# Patient Record
Sex: FEMALE | Race: BLACK OR AFRICAN AMERICAN | Employment: OTHER | URBAN - METROPOLITAN AREA
[De-identification: names, ages, dates, MRNs, and addresses within clinical notes are randomized per-mention and may not be internally consistent; named-entity substitution may affect disease eponyms.]

---

## 2017-03-06 ENCOUNTER — HOSPITAL ENCOUNTER (OUTPATIENT)
Dept: LAB | Age: 67
Discharge: HOME | End: 2017-03-06

## 2017-03-06 ENCOUNTER — OFFICE VISIT (OUTPATIENT)
Dept: INTERNAL MEDICINE CLINIC | Age: 67
End: 2017-03-06

## 2017-03-06 VITALS
WEIGHT: 233.6 LBS | DIASTOLIC BLOOD PRESSURE: 75 MMHG | SYSTOLIC BLOOD PRESSURE: 174 MMHG | HEIGHT: 62 IN | RESPIRATION RATE: 20 BRPM | HEART RATE: 100 BPM | BODY MASS INDEX: 42.99 KG/M2 | TEMPERATURE: 97.7 F | OXYGEN SATURATION: 100 %

## 2017-03-06 DIAGNOSIS — E78.00 HYPERCHOLESTEROLEMIA: Primary | Chronic | ICD-10-CM

## 2017-03-06 DIAGNOSIS — N18.30 CRI (CHRONIC RENAL INSUFFICIENCY), STAGE 3 (MODERATE) (HCC): Chronic | ICD-10-CM

## 2017-03-06 DIAGNOSIS — I10 ESSENTIAL HYPERTENSION: Chronic | ICD-10-CM

## 2017-03-06 DIAGNOSIS — Z13.5 SCREENING FOR GLAUCOMA: ICD-10-CM

## 2017-03-06 PROCEDURE — 99001 SPECIMEN HANDLING PT-LAB: CPT | Performed by: INTERNAL MEDICINE

## 2017-03-06 RX ORDER — AMLODIPINE BESYLATE 10 MG/1
10 TABLET ORAL DAILY
Qty: 30 TAB | Refills: 5 | Status: SHIPPED | OUTPATIENT
Start: 2017-03-06 | End: 2017-08-28 | Stop reason: SDUPTHER

## 2017-03-06 NOTE — PROGRESS NOTES
Chief Complaint   Patient presents with    Cholesterol Problem    Hypertension       Pt preferred language for health care discussion is english. Is someone accompanying this pt? no    Is the patient using any DME equipment during [address]? [place]    Depression Screening completed. Yes    Learning Assessment completed. Yes    Abuse Screening completed. Yes    Health Maintenance reviewed and discussed per provider. Yes    Pt is due for Eye exam.  Please order/place referral if appropriate. Advance Directive:  1. Do you have an advance directive in place? Patient Reply:no    2. If not, would you like material regarding how to put one in place? Patient Reply: No    Coordination of Care:  1. Have you been to the ER, urgent care clinic since your last visit? Hospitalized since your last visit? no    2. Have you seen or consulted any other health care providers outside of the [address] [place] [address] since your last visit? Include any pap smears or colon screening.  no

## 2017-03-06 NOTE — PROGRESS NOTES
HISTORY OF PRESENT ILLNESS  Cyn Perry is a 77 y.o. female. Visit Vitals    /78    Pulse 100    Temp 97.7 °F (36.5 °C) (Oral)    Resp 20    Ht 5' 2\" (1.575 m)    Wt 233 lb 9.6 oz (106 kg)    SpO2 100%    BMI 42.73 kg/m2       HPI Comments: Pt never got to the kidney specialist--see notes. She either cancelled on no showed for 3 appts so they will not reschedule her. Will need to find another doctor that takes [facility]    Cholesterol Problem   The history is provided by the patient. This is a chronic problem. The current episode started more than 1 week ago. The problem occurs daily. Pertinent negatives include no chest pain and no headaches. Hypertension    Pertinent negatives include no chest pain, no palpitations, no headaches and no dizziness. Review of Systems   Constitutional: Negative. Cardiovascular: Negative for chest pain and palpitations. Neurological: Negative for dizziness and headaches. Physical Exam   Constitutional: She is oriented to person, place, and time. She appears well-developed and well-nourished. No distress. Cardiovascular: Normal rate and regular rhythm. Pulmonary/Chest: Effort normal and breath sounds normal.   Musculoskeletal: She exhibits edema. Neurological: She is alert and oriented to person, place, and time. Skin: Skin is warm and dry. She is not diaphoretic. Psychiatric: She has a normal mood and affect. Nursing note and vitals reviewed. ASSESSMENT and PLAN    ICD-10-CM ICD-9-CM    1. Hypercholesterolemia E78.00 272.0 LIPID PANEL   2. Essential hypertension O26 688.1 METABOLIC PANEL, COMPREHENSIVE      URINALYSIS W/ RFLX MICROSCOPIC   3. CRI (chronic renal insufficiency), stage 3 (moderate) N18.3 585.3 REFERRAL TO NEPHROLOGY      METABOLIC PANEL, COMPREHENSIVE      MICROALBUMIN, UR, RAND W/ MICROALBUMIN/CREA RATIO      URINALYSIS W/ RFLX MICROSCOPIC   4.  Screening for glaucoma Z13.5 V80.1 REFERRAL TO OPTOMETRY       BP up. Will inc norvasc to 10 mg. She may not have been taking it as her refills ran out months ago.   Consider adding hydralazine given her renal disease    Will try for another nephrology office    Needs referral to eye doctor for glaucoma screening    Update lab    F/u one month for BP check

## 2017-03-06 NOTE — MR AVS SNAPSHOT
Visit Information Date & Time Provider Department Dept. Phone Encounter #  
 3/6/2017 10:45 AM Ajith Phipps, [address] [id number] Follow-up Instructions Return in about 1 month (around 4/6/2017) for Medicare Wellness Visit, htn. Upcoming Health Maintenance Date Due  
 GLAUCOMA SCREENING Q2Y 5/4/2015 Pneumococcal 65+ Low/Medium Risk (2 of 2 - PPSV23) 4/12/2017 MEDICARE YEARLY EXAM 4/13/2017 BREAST CANCER SCRN MAMMOGRAM 11/22/2018 COLONOSCOPY 2/1/2020 DTaP/Tdap/Td series (2 - Td) 9/28/2021 Allergies as of 3/6/2017  Review Complete On: 3/6/2017 By: Shana Kraft LPN No Known Allergies Current Immunizations  Reviewed on 10/8/2015 Name Date Influenza High Dose Vaccine PF 11/4/2016 12:35 PM, 10/8/2015  2:30 PM  
 Pneumococcal Conjugate (PCV-13) 4/12/2016 11:48 AM  
 Tdap 9/28/2011 Not reviewed this visit You Were Diagnosed With   
  
 Codes Comments Hypercholesterolemia    -  Primary ICD-10-CM: E78.00 ICD-9-CM: 272.0 Essential hypertension     ICD-10-CM: I10 
ICD-9-CM: 401.9 CRI (chronic renal insufficiency), stage 3 (moderate)     ICD-10-CM: N18.3 ICD-9-CM: 491. 3 Screening for glaucoma     ICD-10-CM: Z13.5 ICD-9-CM: V80.1 Vitals BP Pulse Temp Resp Height(growth percentile) Weight(growth percentile) 174/75 100 97.7 °F (36.5 °C) (Oral) 20 5' 2\" (1.575 m) 233 lb 9.6 oz (106 kg) SpO2 BMI OB Status Smoking Status 100% 42.73 kg/m2 Postmenopausal Former Smoker Vitals History BMI and BSA Data Body Mass Index Body Surface Area 42.73 kg/m 2 2.15 m 2 Preferred Pharmacy Pharmacy Name Phone [facility] [address] [place] [address]  [phone] Your Updated Medication List  
  
   
This list is accurate as of: 3/6/17 11:21 AM.  Always use your most recent med list. amLODIPine 10 mg tablet Commonly known as:  Zonia Bakes Take 1 Tab by mouth daily. Indications: hypertension  
  
 betamethasone valerate 0.1 % topical cream  
Commonly known as:  [place] Apply  to affected area two (2) times a day. raNITIdine 150 mg tablet Commonly known as:  ZANTAC Take 1 Tab by mouth daily. Indications: GASTROESOPHAGEAL REFLUX  
  
 simvastatin 20 mg tablet Commonly known as:  ZOCOR Take 1 Tab by mouth nightly. Indications: hyperlipidemia  
  
 valsartan-hydroCHLOROthiazide 160-12.5 mg per tablet Commonly known as:  DIOVAN-HCT Take 1 Tab by mouth daily. varicella-zoster vacine live 19,400 unit/0.65 mL Susr injection Generic drug:  varicella zoster vacine live Prescriptions Sent to Pharmacy Refills  
 amLODIPine (NORVASC) 10 mg tablet 5 Sig: Take 1 Tab by mouth daily. Indications: hypertension Class: Normal  
 Pharmacy: [address] [place] [address] #: [phone] Route: Oral  
  
We Performed the Following REFERRAL TO NEPHROLOGY [AXI34 Custom] Comments:  
 Please evaluate patient for worsening renal insufficiency. REFERRAL TO OPTOMETRY [id number] Custom] Follow-up Instructions Return in about 1 month (around 4/6/2017) for Medicare Wellness Visit, htn. To-Do List   
 03/06/2017 Lab:  LIPID PANEL   
  
 03/06/2017 Lab:  METABOLIC PANEL, COMPREHENSIVE   
  
 03/06/2017 Lab:  MICROALBUMIN, UR, RAND W/ MICROALBUMIN/CREA RATIO   
  
 03/06/2017 Lab:  URINALYSIS W/ RFLX MICROSCOPIC Referral Information Referral ID Referred By Referred To  
  
 [id number] [facility] Not Available Visits Status Start Date End Date 1 New Request 3/6/17 3/6/18 If your referral has a status of pending review or denied, additional information will be sent to support the outcome of this decision. Referral ID Referred By Referred To  
 [id number] [facility] Not Available Visits Status Start Date End Date 1 New Request 3/6/17 3/6/18 If your referral has a status of pending review or denied, additional information will be sent to support the outcome of this decision. Introducing [facility]! Kings Almanza introduces [employer] patient portal. Now you can access parts of your medical record, email your doctor's office, and request medication refills online. 1. In your internet browser, go to [email or web address] 2. Click on the First Time User? Click Here link in the Sign In box. You will see the New Member Sign Up page. 3. Enter your [employer] Access Code exactly as it appears below. You will not need to use this code after youve completed the sign-up process. If you do not sign up before the expiration date, you must request a new code. · [employer] Access Code: [email or web address] Expires: 6/4/2017 11:21 AM 
 
4. Enter the last four digits of your Social Security Number (xxxx) and Date of Birth (mm/dd/yyyy) as indicated and click Submit. You will be taken to the next sign-up page. 5. Create a TheS[employer] ID. This will be your [employer] login ID and cannot be changed, so think of one that is secure and easy to remember. 6. Create a [employer] password. You can change your password at any time. 7. Enter your Password Reset Question and Answer. This can be used at a later time if you forget your password. 8. Enter your e-mail address. You will receive e-mail notification when new information is available in [address] 9. Click Sign Up. You can now view and download portions of your medical record. 10. Click the Download Summary menu link to download a portable copy of your medical information. If you have questions, please visit the Frequently Asked Questions section of the [employer] website. Remember, [employer] is NOT to be used for urgent needs. For medical emergencies, dial 911. Now available from your iPhone and Android! Please provide this summary of care documentation to your next provider. Your primary care clinician is listed as [facility]. If you have any questions after today's visit, please call [phone].

## 2017-03-07 DIAGNOSIS — Z76.0 MEDICATION REFILL: ICD-10-CM

## 2017-03-07 LAB
ALBUMIN SERPL-MCNC: 3.9 G/DL (ref 3.6–4.8)
ALBUMIN/CREAT UR: 4.9 MG/G CREAT (ref 0–30)
ALBUMIN/GLOB SERPL: 1.3 {RATIO} (ref 1.1–2.5)
ALP SERPL-CCNC: 83 IU/L (ref 39–117)
ALT SERPL-CCNC: 8 IU/L (ref 0–32)
APPEARANCE UR: ABNORMAL
AST SERPL-CCNC: 14 IU/L (ref 0–40)
BACTERIA #/AREA URNS HPF: NORMAL /[HPF]
BILIRUB SERPL-MCNC: 0.3 MG/DL (ref 0–1.2)
BILIRUB UR QL STRIP: NEGATIVE
BUN SERPL-MCNC: 10 MG/DL (ref 8–27)
BUN/CREAT SERPL: 9 (ref 11–26)
CALCIUM SERPL-MCNC: 8.9 MG/DL (ref 8.7–10.3)
CASTS URNS QL MICRO: NORMAL /LPF
CHLORIDE SERPL-SCNC: 104 MMOL/L (ref 96–106)
CHOLEST SERPL-MCNC: 171 MG/DL (ref 100–199)
CO2 SERPL-SCNC: 24 MMOL/L (ref 18–29)
COLOR UR: YELLOW
CREAT SERPL-MCNC: 1.13 MG/DL (ref 0.57–1)
CREAT UR-MCNC: 85.4 MG/DL
EPI CELLS #/AREA URNS HPF: NORMAL /HPF
GLOBULIN SER CALC-MCNC: 3.1 G/DL (ref 1.5–4.5)
GLUCOSE SERPL-MCNC: 97 MG/DL (ref 65–99)
GLUCOSE UR QL: NEGATIVE
HDLC SERPL-MCNC: 59 MG/DL
HGB UR QL STRIP: NEGATIVE
INTERPRETATION, 910389: NORMAL
KETONES UR QL STRIP: NEGATIVE
LDLC SERPL CALC-MCNC: 94 MG/DL (ref 0–99)
LEUKOCYTE ESTERASE UR QL STRIP: ABNORMAL
MICRO URNS: ABNORMAL
MICROALBUMIN UR-MCNC: 4.2 UG/ML
MUCOUS THREADS URNS QL MICRO: PRESENT
NITRITE UR QL STRIP: NEGATIVE
PH UR STRIP: 6 [PH] (ref 5–7.5)
POTASSIUM SERPL-SCNC: 4 MMOL/L (ref 3.5–5.2)
PROT SERPL-MCNC: 7 G/DL (ref 6–8.5)
PROT UR QL STRIP: NEGATIVE
RBC #/AREA URNS HPF: NORMAL /HPF
SODIUM SERPL-SCNC: 141 MMOL/L (ref 134–144)
SP GR UR: 1.01 (ref 1–1.03)
TRIGL SERPL-MCNC: 92 MG/DL (ref 0–149)
UROBILINOGEN UR STRIP-MCNC: 0.2 MG/DL (ref 0.2–1)
VLDLC SERPL CALC-MCNC: 18 MG/DL (ref 5–40)
WBC #/AREA URNS HPF: NORMAL /HPF

## 2017-03-07 RX ORDER — BETAMETHASONE VALERATE 1.2 MG/G
CREAM TOPICAL 2 TIMES DAILY
Qty: 45 G | Refills: 5 | Status: SHIPPED | OUTPATIENT
Start: 2017-03-07 | End: 2018-04-17

## 2017-03-07 NOTE — TELEPHONE ENCOUNTER
Pharmacy called per patient's request for medication, last OV 3/6/17    Requested Prescriptions     Pending Prescriptions Disp Refills    betamethasone valerate (VALISONE) 0.1 % topical cream 45 g 5     Sig: Apply  to affected area two (2) times a day.

## 2017-03-10 DIAGNOSIS — N18.30 CRI (CHRONIC RENAL INSUFFICIENCY), STAGE 3 (MODERATE) (HCC): Chronic | ICD-10-CM

## 2017-03-10 DIAGNOSIS — I10 ESSENTIAL HYPERTENSION WITH GOAL BLOOD PRESSURE LESS THAN 140/90: Chronic | ICD-10-CM

## 2017-03-10 DIAGNOSIS — E78.00 HYPERCHOLESTEROLEMIA: Chronic | ICD-10-CM

## 2017-05-11 ENCOUNTER — OFFICE VISIT (OUTPATIENT)
Dept: INTERNAL MEDICINE CLINIC | Age: 67
End: 2017-05-11

## 2017-05-11 VITALS
TEMPERATURE: 96.6 F | HEART RATE: 103 BPM | SYSTOLIC BLOOD PRESSURE: 110 MMHG | DIASTOLIC BLOOD PRESSURE: 57 MMHG | HEIGHT: 62 IN | WEIGHT: 232 LBS | RESPIRATION RATE: 18 BRPM | OXYGEN SATURATION: 100 % | BODY MASS INDEX: 42.69 KG/M2

## 2017-05-11 DIAGNOSIS — E78.00 HYPERCHOLESTEROLEMIA: Chronic | ICD-10-CM

## 2017-05-11 DIAGNOSIS — Z00.00 ROUTINE GENERAL MEDICAL EXAMINATION AT A HEALTH CARE FACILITY: Primary | ICD-10-CM

## 2017-05-11 DIAGNOSIS — I10 ESSENTIAL HYPERTENSION: Chronic | ICD-10-CM

## 2017-05-11 DIAGNOSIS — N18.30 CRI (CHRONIC RENAL INSUFFICIENCY), STAGE 3 (MODERATE) (HCC): Chronic | ICD-10-CM

## 2017-05-11 DIAGNOSIS — Z23 ENCOUNTER FOR IMMUNIZATION: ICD-10-CM

## 2017-05-11 DIAGNOSIS — Z13.39 SCREENING FOR ALCOHOLISM: ICD-10-CM

## 2017-05-11 NOTE — PROGRESS NOTES
HISTORY OF PRESENT ILLNESS  Cyn Perry is a 79 y.o. female. Visit Vitals    /57    Pulse (!) 103    Temp 96.6 °F (35.9 °C) (Oral)    Resp 18    Ht 5' 2\" (1.575 m)    Wt 232 lb (105.2 kg)    SpO2 100%    BMI 42.43 kg/m2       Hypertension    This is a chronic problem. The current episode started more than 1 week ago. Pertinent negatives include no chest pain, no palpitations, no blurred vision, no headaches and no dizziness. There are no associated agents to hypertension. Risk factors include obesity, postmenopause and hypertension. Review of Systems   Constitutional: Negative for chills and fever. Eyes: Negative for blurred vision. Cardiovascular: Negative for chest pain and palpitations. Neurological: Negative for dizziness and headaches. Physical Exam   Constitutional: She is oriented to person, place, and time. She appears well-developed and well-nourished. No distress. Cardiovascular: Normal rate and regular rhythm. Pulmonary/Chest: Effort normal and breath sounds normal.   Musculoskeletal: She exhibits edema (pt has chronic brawny stasis changes in her legs). Neurological: She is alert and oriented to person, place, and time. Skin: Skin is warm and dry. She is not diaphoretic. Psychiatric: She has a normal mood and affect. Nursing note and vitals reviewed. ASSESSMENT and PLAN    ICD-10-CM ICD-9-CM    1. Routine general medical examination at a health care facility Z00.00 V70.0    2. Screening for alcoholism Z13.89 V79.1    3. Encounter for immunization Z23 V03.89 ADMIN PNEUMOCOCCAL VACCINE      PNEUMOCOCCAL POLYSACCHARIDE VACCINE, 23-VALENT, ADULT OR IMMUNOSUPPRESSED PT DOSE,   4. Hypercholesterolemia E78.00 272.0    5. Essential hypertension I10 401.9    6. CRI (chronic renal insufficiency), stage 3 (moderate) N18.3 585. 3        BP looks good    Lab done only 2 months ago and looked good.  Kidney functio was slightly better    Discussed weight, lifestyle, diet and exercise. Pt tries as best she can.  Has gone to some nutrition classes at her building    F/u 4 months

## 2017-05-11 NOTE — PROGRESS NOTES
Chief Complaint   Patient presents with    Annual Wellness Visit       Pt preferred language for health care discussion is english. Is someone accompanying this pt? no    Is the patient using any DME equipment during OV? no    Depression Screening completed. Yes    Learning Assessment completed. Yes    Abuse Screening completed. Yes    Health Maintenance reviewed and discussed per provider. Yes    Pt is due for Pneumo-13 or Peumo-23. Please order/place referral if appropriate. Advance Directive:  1. Do you have an advance directive in place? Patient Reply:no    2. If not, would you like material regarding how to put one in place? Patient Reply: No    Coordination of Care:  1. Have you been to the ER, urgent care clinic since your last visit? Hospitalized since your last visit? no    2. Have you seen or consulted any other health care providers outside of the [address] [place] [address] since your last visit? Include any pap smears or colon screening.  no

## 2017-05-11 NOTE — PROGRESS NOTES
This is an Initial Medicare Annual Wellness Exam (AWV) (Performed 12 months after IPPE or effective date of Medicare Part B enrollment, Once in a lifetime)    I have reviewed the patient's medical history in detail and updated the computerized patient record. History     Past Medical History:   Diagnosis Date    Anemia NEC     Atrophic vaginitis     H/O bone density study 10/3/11    WNL    Hiatal hernia     Hypercholesterolemia     Internal hemorrhoid     Obesity     Pedal edema     Stasis dermatitis       Past Surgical History:   Procedure Laterality Date    HX COLONOSCOPY  2/10    10 yr f/u, Dr. Matheus Carreon HX HEENT      dental problems     Current Outpatient Prescriptions   Medication Sig Dispense Refill    amLODIPine (NORVASC) 10 mg tablet Take 1 Tab by mouth daily. Indications: hypertension 30 Tab 5    raNITIdine (ZANTAC) 150 mg tablet Take 1 Tab by mouth daily. Indications: GASTROESOPHAGEAL REFLUX 30 Tab 5    simvastatin (ZOCOR) 20 mg tablet Take 1 Tab by mouth nightly. Indications: hyperlipidemia 30 Tab 5    valsartan-hydrochlorothiazide (DIOVAN-HCT) 160-12.5 mg per tablet Take 1 Tab by mouth daily. 30 Tab 5    betamethasone valerate (VALISONE) 0.1 % topical cream Apply  to affected area two (2) times a day.  45 g 5    VARICELLA-ZOSTER VIRUS INFECTION PROPHYLAXIS 19,400 unit/0.65 mL susr injection        No Known Allergies  Family History   Problem Relation Age of Onset    Diabetes Mother     Diabetes Father      Social History   Substance Use Topics    Smoking status: Former Smoker    Smokeless tobacco: Never Used    Alcohol use No     Patient Active Problem List   Diagnosis Code    Hypercholesterolemia E78.00    Essential hypertension I10    CRI (chronic renal insufficiency) N18.9         Depression Risk Factor Screening:     PHQ over the last two weeks 5/11/2017   Little interest or pleasure in doing things Not at all   Feeling down, depressed or hopeless Not at all   Total Score PHQ 2 0     Alcohol Risk Factor Screening: On any occasion during the past 3 months, have you had more than 3 drinks containing alcohol? No    Do you average more than 7 drinks per week? No    Functional Ability and Level of Safety:     Hearing Loss   none    Activities of Daily Living   Self-care. Requires assistance with: no ADLs    Fall Risk     Fall Risk Assessment, last 12 mths 3/6/2017   Able to walk? Yes   Fall in past 12 months? No     Abuse Screen   Patient is not abused    Review of Systems   A comprehensive review of systems was negative except for that written in the HPI.  none    Physical Examination     No exam data present    Evaluation of Cognitive Function:  Mood/affect:  happy  Appearance: age appropriate  Family member/caregiver input: self    No exam performed today, not indicated. Patient Care Team:  Cherlynn Kayser, MD as PCP - General (Internal Medicine)  Araseli Santos MD as Consulting Provider (Ophthalmology)    Advice/Referrals/Counseling   Education and counseling provided:  Are appropriate based on today's review and evaluation      Assessment/Plan       ICD-10-CM ICD-9-CM    1. Routine general medical examination at a health care facility Z00.00 V70.0    2. Screening for alcoholism Z13.89 V79.1    3. Encounter for immunization Z23 V03.89 ADMIN PNEUMOCOCCAL VACCINE      PNEUMOCOCCAL POLYSACCHARIDE VACCINE, 23-VALENT, ADULT OR IMMUNOSUPPRESSED PT DOSE,   .

## 2017-05-11 NOTE — MR AVS SNAPSHOT
Visit Information Date & Time Provider Department Dept. Phone Encounter #  
 5/11/2017  2:00 PM Blaine Nichols, [id number] Follow-up Instructions Return in about 4 months (around 9/11/2017) for htn, cholesterol. Upcoming Health Maintenance Date Due  
 GLAUCOMA SCREENING Q2Y 5/4/2015 Pneumococcal 65+ Low/Medium Risk (2 of 2 - PPSV23) 4/12/2017 MEDICARE YEARLY EXAM 4/13/2017 INFLUENZA AGE 9 TO ADULT 8/1/2017 BREAST CANCER SCRN MAMMOGRAM 11/22/2018 COLONOSCOPY 2/1/2020 DTaP/Tdap/Td series (2 - Td) 9/28/2021 Allergies as of 5/11/2017  Review Complete On: 5/11/2017 By: Blaine Nichols MD  
 No Known Allergies Current Immunizations  Reviewed on 10/8/2015 Name Date Influenza High Dose Vaccine PF 11/4/2016 12:35 PM, 10/8/2015  2:30 PM  
 Pneumococcal Conjugate (PCV-13) 4/12/2016 11:48 AM  
 Pneumococcal Polysaccharide (PPSV-23)  Incomplete Tdap 9/28/2011 Not reviewed this visit You Were Diagnosed With   
  
 Codes Comments Routine general medical examination at a health care facility    -  Primary ICD-10-CM: Z00.00 ICD-9-CM: V70.0 Screening for alcoholism     ICD-10-CM: Z13.89 ICD-9-CM: V79.1 Encounter for immunization     ICD-10-CM: O54 ICD-9-CM: V03.89 Hypercholesterolemia     ICD-10-CM: E78.00 ICD-9-CM: 272.0 Essential hypertension     ICD-10-CM: I10 
ICD-9-CM: 401.9 CRI (chronic renal insufficiency), stage 3 (moderate)     ICD-10-CM: N18.3 ICD-9-CM: 296. 3 Vitals BP Pulse Temp Resp Height(growth percentile) Weight(growth percentile) 110/57 (!) 103 96.6 °F (35.9 °C) (Oral) 18 5' 2\" (1.575 m) 232 lb (105.2 kg) SpO2 BMI OB Status Smoking Status 100% 42.43 kg/m2 Postmenopausal Former Smoker BMI and BSA Data Body Mass Index Body Surface Area  
 42.43 kg/m 2 2.15 m 2 Preferred Pharmacy Pharmacy Name Phone [address]  [phone] Your Updated Medication List  
  
   
This list is accurate as of: 5/11/17  2:54 PM.  Always use your most recent med list. amLODIPine 10 mg tablet Commonly known as:  Jarvis Pancho Take 1 Tab by mouth daily. Indications: hypertension  
  
 betamethasone valerate 0.1 % topical cream  
Commonly known as:  Severiano Liban Apply  to affected area two (2) times a day. raNITIdine 150 mg tablet Commonly known as:  ZANTAC Take 1 Tab by mouth daily. Indications: GASTROESOPHAGEAL REFLUX  
  
 simvastatin 20 mg tablet Commonly known as:  ZOCOR Take 1 Tab by mouth nightly. Indications: hyperlipidemia  
  
 valsartan-hydroCHLOROthiazide 160-12.5 mg per tablet Commonly known as:  DIOVAN-HCT Take 1 Tab by mouth daily. varicella-zoster vacine live 19,400 unit/0.65 mL Susr injection Generic drug:  varicella zoster vacine live We Performed the Following ADMIN PNEUMOCOCCAL VACCINE [ [facility]] PNEUMOCOCCAL POLYSACCHARIDE VACCINE, 23-VALENT, ADULT OR IMMUNOSUPPRESSED PT DOSE, [90289 CPT(R)] Follow-up Instructions Return in about 4 months (around 9/11/2017) for htn, cholesterol. Patient Instructions Schedule of Personalized Health Plan (Provide Copy to Patient) The best way to stay healthy is to live a healthy lifestyle. A healthy lifestyle includes regular exercise, eating a well-balanced diet, keeping a healthy weight and not smoking. Regular physical exams and screening tests are another important way to take care of yourself. Preventive exams provided by health care providers can find health problems early when treatment works best and can keep you from getting certain diseases or illnesses. Preventive services include exams, lab tests, screenings, shots, monitoring and information to help you take care of your own health. All people over 65 should have a pneumonia shot. Pneumonia shots are usually only needed once in a lifetime unless your doctor decides differently. All people over 65 should have a yearly flu shot. People over 65 are at medium to high risk for Hepatitis B. Three shots are needed for complete protection. In addition to your physical exam, some screening tests are recommended: 
 
Bone mass measurement (dexa scan) is recommended every two years Diabetes Mellitus screening is recommended every year. Glaucoma is an eye disease caused by high pressure in the eye. An eye exam is recommended every year. Cardiovascular screening tests that check your cholesterol and other blood fat (lipid) levels are recommended every five years. Colorectal Cancer screening tests help to find pre-cancerous polyps (growths in the colon) so they can be removed before they turn into cancer. Tests ordered for screening depend on your personal and family history risk factors. Screening for Breast Cancer is recommended yearly with a mammogram. 
 
Screening for Cervical Cancer is recommended every two years (annually for certain risk factors, such as previous history of STD or abnormal PAP in past 7 years), with a Pelvic Exam with PAP Here is a list of your current Health Maintenance items with a due date: 
Health Maintenance Topic Date Due  GLAUCOMA SCREENING Q2Y  05/04/2015  Pneumococcal 65+ Low/Medium Risk (2 of 2 - PPSV23) 04/12/2017  MEDICARE YEARLY EXAM  04/13/2017  INFLUENZA AGE 9 TO ADULT  08/01/2017  BREAST CANCER SCRN MAMMOGRAM  11/22/2018  COLONOSCOPY  02/01/2020  
 DTaP/Tdap/Td series (2 - Td) 09/28/2021  Hepatitis C Screening  Completed  OSTEOPOROSIS SCREENING (DEXA)  Completed  ZOSTER VACCINE AGE 60>  Completed Introducing [facility]!    
 Dejah Isaacs introduces [employer] patient portal. Now you can access parts of your medical record, email your doctor's office, and request medication refills online. 1. In your internet browser, go to [email or web address] 2. Click on the First Time User? Click Here link in the Sign In box. You will see the New Member Sign Up page. 3. Enter your Startist Access Code exactly as it appears below. You will not need to use this code after youve completed the sign-up process. If you do not sign up before the expiration date, you must request a new code. · Startist Access Code: [email or web address] Expires: 6/4/2017 12:21 PM 
 
4. Enter the last four digits of your Social Security Number (xxxx) and Date of Birth (mm/dd/yyyy) as indicated and click Submit. You will be taken to the next sign-up page. 5. Create a Startist ID. This will be your Startist login ID and cannot be changed, so think of one that is secure and easy to remember. 6. Create a Startist password. You can change your password at any time. 7. Enter your Password Reset Question and Answer. This can be used at a later time if you forget your password. 8. Enter your e-mail address. You will receive e-mail notification when new information is available in [address] 9. Click Sign Up. You can now view and download portions of your medical record. 10. Click the Download Summary menu link to download a portable copy of your medical information. If you have questions, please visit the Frequently Asked Questions section of the Startist website. Remember, Startist is NOT to be used for urgent needs. For medical emergencies, dial 911. Now available from your iPhone and Android! Please provide this summary of care documentation to your next provider. Your primary care clinician is listed as [facility]. If you have any questions after today's visit, please call [phone].

## 2017-05-11 NOTE — PATIENT INSTRUCTIONS
Schedule of Personalized Health Plan  (Provide Copy to Patient)  The best way to stay healthy is to live a healthy lifestyle. A healthy lifestyle includes regular exercise, eating a well-balanced diet, keeping a healthy weight and not smoking. Regular physical exams and screening tests are another important way to take care of yourself. Preventive exams provided by health care providers can find health problems early when treatment works best and can keep you from getting certain diseases or illnesses. Preventive services include exams, lab tests, screenings, shots, monitoring and information to help you take care of your own health. All people over 65 should have a pneumonia shot. Pneumonia shots are usually only needed once in a lifetime unless your doctor decides differently. All people over 65 should have a yearly flu shot. People over 65 are at medium to high risk for Hepatitis B. Three shots are needed for complete protection. In addition to your physical exam, some screening tests are recommended:    Bone mass measurement (dexa scan) is recommended every two years  Diabetes Mellitus screening is recommended every year. Glaucoma is an eye disease caused by high pressure in the eye. An eye exam is recommended every year. Cardiovascular screening tests that check your cholesterol and other blood fat (lipid) levels are recommended every five years. Colorectal Cancer screening tests help to find pre-cancerous polyps (growths in the colon) so they can be removed before they turn into cancer. Tests ordered for screening depend on your personal and family history risk factors.     Screening for Breast Cancer is recommended yearly with a mammogram.    Screening for Cervical Cancer is recommended every two years (annually for certain risk factors, such as previous history of STD or abnormal PAP in past 7 years), with a Pelvic Exam with PAP    Here is a list of your current Health Maintenance items with a due date:  Health Maintenance   Topic Date Due    GLAUCOMA SCREENING Q2Y  05/04/2015    Pneumococcal 65+ Low/Medium Risk (2 of 2 - PPSV23) 04/12/2017    MEDICARE YEARLY EXAM  04/13/2017    INFLUENZA AGE 9 TO ADULT  08/01/2017    BREAST CANCER SCRN MAMMOGRAM  11/22/2018    COLONOSCOPY  02/01/2020    DTaP/Tdap/Td series (2 - Td) 09/28/2021    Hepatitis C Screening  Completed    OSTEOPOROSIS SCREENING (DEXA)  Completed    ZOSTER VACCINE AGE 60>  Completed

## 2017-07-05 RX ORDER — VALSARTAN AND HYDROCHLOROTHIAZIDE 160; 12.5 MG/1; MG/1
TABLET, FILM COATED ORAL
Qty: 30 TAB | Refills: 2 | Status: SHIPPED | OUTPATIENT
Start: 2017-07-05 | End: 2017-10-02 | Stop reason: SDUPTHER

## 2017-07-31 DIAGNOSIS — Z76.0 MEDICATION REFILL: ICD-10-CM

## 2017-07-31 RX ORDER — RANITIDINE 150 MG/1
TABLET, FILM COATED ORAL
Qty: 30 TAB | Refills: 5 | Status: SHIPPED | OUTPATIENT
Start: 2017-07-31 | End: 2018-01-31 | Stop reason: SDUPTHER

## 2017-07-31 RX ORDER — SIMVASTATIN 20 MG/1
TABLET, FILM COATED ORAL
Qty: 30 TAB | Refills: 5 | Status: SHIPPED | OUTPATIENT
Start: 2017-07-31 | End: 2018-02-01 | Stop reason: SDUPTHER

## 2017-08-28 DIAGNOSIS — I10 ESSENTIAL HYPERTENSION: Chronic | ICD-10-CM

## 2017-08-28 RX ORDER — AMLODIPINE BESYLATE 10 MG/1
TABLET ORAL
Qty: 30 TAB | Refills: 5 | Status: SHIPPED | OUTPATIENT
Start: 2017-08-28 | End: 2018-03-05 | Stop reason: SDUPTHER

## 2017-10-02 RX ORDER — VALSARTAN AND HYDROCHLOROTHIAZIDE 160; 12.5 MG/1; MG/1
1 TABLET, FILM COATED ORAL DAILY
Qty: 30 TAB | Refills: 5 | Status: SHIPPED | OUTPATIENT
Start: 2017-10-02 | End: 2018-04-05 | Stop reason: SDUPTHER

## 2017-10-02 NOTE — TELEPHONE ENCOUNTER
Requested Prescriptions     Pending Prescriptions Disp Refills    valsartan-hydroCHLOROthiazide (DIOVAN-HCT) 160-12.5 mg per tablet 30 Tab 2

## 2017-10-03 ENCOUNTER — HOSPITAL ENCOUNTER (OUTPATIENT)
Dept: LAB | Age: 67
Discharge: HOME | End: 2017-10-03

## 2017-10-03 ENCOUNTER — OFFICE VISIT (OUTPATIENT)
Dept: INTERNAL MEDICINE CLINIC | Age: 67
End: 2017-10-03

## 2017-10-03 VITALS
SYSTOLIC BLOOD PRESSURE: 130 MMHG | HEART RATE: 101 BPM | WEIGHT: 229 LBS | HEIGHT: 62 IN | BODY MASS INDEX: 42.14 KG/M2 | OXYGEN SATURATION: 100 % | TEMPERATURE: 98 F | RESPIRATION RATE: 17 BRPM | DIASTOLIC BLOOD PRESSURE: 81 MMHG

## 2017-10-03 DIAGNOSIS — Z23 ENCOUNTER FOR IMMUNIZATION: ICD-10-CM

## 2017-10-03 DIAGNOSIS — E78.00 HYPERCHOLESTEROLEMIA: Chronic | ICD-10-CM

## 2017-10-03 DIAGNOSIS — I10 ESSENTIAL HYPERTENSION: Primary | Chronic | ICD-10-CM

## 2017-10-03 DIAGNOSIS — N18.30 CHRONIC RENAL IMPAIRMENT, STAGE 3 (MODERATE) (HCC): Chronic | ICD-10-CM

## 2017-10-03 DIAGNOSIS — I87.2 STASIS DERMATITIS OF BOTH LEGS: ICD-10-CM

## 2017-10-03 PROCEDURE — 99001 SPECIMEN HANDLING PT-LAB: CPT | Performed by: INTERNAL MEDICINE

## 2017-10-03 RX ORDER — AMMONIUM LACTATE 12 G/100G
LOTION TOPICAL
Qty: 400 ML | Refills: 5 | Status: SHIPPED | OUTPATIENT
Start: 2017-10-03 | End: 2018-04-17 | Stop reason: SDUPTHER

## 2017-10-03 NOTE — ACP (ADVANCE CARE PLANNING)
Advance Directive:  1. Do you have an advance directive in place? Patient Reply:no    2. If not, would you like material regarding how to put one in place?  Patient Reply: yes

## 2017-10-03 NOTE — MR AVS SNAPSHOT
Visit Information Date & Time Provider Department Dept. Phone Encounter #  
 10/3/2017 11:00 AM [address] [phone] [id number] Follow-up Instructions Return in about 4 months (around 2/3/2018) for htn, cholesterol. Upcoming Health Maintenance Date Due INFLUENZA AGE 9 TO ADULT 8/1/2017 GLAUCOMA SCREENING Q2Y 12/2/2017* MEDICARE YEARLY EXAM 5/12/2018 BREAST CANCER SCRN MAMMOGRAM 11/22/2018 COLONOSCOPY 2/1/2020 DTaP/Tdap/Td series (2 - Td) 9/28/2021 *Topic was postponed. The date shown is not the original due date. Allergies as of 10/3/2017  Review Complete On: 10/3/2017 By: Jeffrey Power MD  
 No Known Allergies Current Immunizations  Reviewed on 10/8/2015 Name Date Influenza High Dose Vaccine PF  Incomplete, 11/4/2016 12:35 PM, 10/8/2015  2:30 PM  
 Pneumococcal Conjugate (PCV-13) 4/12/2016 11:48 AM  
 Pneumococcal Polysaccharide (PPSV-23) 5/11/2017  3:35 PM  
 Tdap 9/28/2011 Not reviewed this visit You Were Diagnosed With   
  
 Codes Comments Essential hypertension    -  Primary ICD-10-CM: I10 
ICD-9-CM: 401.9 Hypercholesterolemia     ICD-10-CM: E78.00 ICD-9-CM: 272.0 Chronic renal impairment, stage 3 (moderate)     ICD-10-CM: N18.3 ICD-9-CM: 585.3 Encounter for immunization     ICD-10-CM: V71 ICD-9-CM: V03.89 Stasis dermatitis of both legs     ICD-10-CM: I87.2 ICD-9-CM: 454.1 Vitals BP Pulse Temp Resp Height(growth percentile) Weight(growth percentile) 130/81 (BP 1 Location: Right arm, BP Patient Position: Sitting) (!) 101 98 °F (36.7 °C) (Oral) 17 5' 2\" (1.575 m) 229 lb (103.9 kg) SpO2 BMI OB Status Smoking Status 100% 41.88 kg/m2 Postmenopausal Former Smoker Vitals History BMI and BSA Data Body Mass Index Body Surface Area  
 41.88 kg/m 2 2.13 m 2 Preferred Pharmacy Pharmacy Name Phone [address]  [phone] Your Updated Medication List  
  
   
This list is accurate as of: 10/3/17 11:25 AM.  Always use your most recent med list. amLODIPine 10 mg tablet Commonly known as:  Soumya Darting TAKE 1 TABLET BY MOUTH DAILY. INDICATIONS: HYPERTENSION  
  
 ammonium lactate 12 % lotion Commonly known as:  LAC-HYDRIN Apply daily to legs after bathing  
  
 betamethasone valerate 0.1 % topical cream  
Commonly known as:  Merwyn Sloop Apply  to affected area two (2) times a day. raNITIdine 150 mg tablet Commonly known as:  ZANTAC TAKE 1 TABLET BY MOUTH DAILY. INDICATIONS: GASTROESOPHAGEAL REFLUX  
  
 simvastatin 20 mg tablet Commonly known as:  ZOCOR  
TAKE 1 TABLET BY MOUTH NIGHTLY. INDICATIONS: HYPERLIPIDEMIA  
  
 valsartan-hydroCHLOROthiazide 160-12.5 mg per tablet Commonly known as:  DIOVAN-HCT Take 1 Tab by mouth daily. varicella-zoster vacine live 19,400 unit/0.65 mL Susr injection Generic drug:  varicella zoster vacine live Prescriptions Sent to Pharmacy Refills  
 ammonium lactate (LAC-HYDRIN) 12 % lotion 5 Sig: Apply daily to legs after bathing Class: Normal  
 Pharmacy: [address] [place] [address] #: [phone] We Performed the Following ADMIN INFLUENZA VIRUS VAC [ \[facility]\""] INFLUENZA VIRUS VACCINE, HIGH DOSE SEASONAL, PRESERVATIVE FREE [28888 CPT(R)] Follow-up Instructions Return in about 4 months (around 2/3/2018) for htn, cholesterol. To-Do List   
 10/03/2017 Lab:  LIPID PANEL   
  
 10/03/2017 Lab:  METABOLIC PANEL, COMPREHENSIVE Introducing [facility]! Neeraj Lin introduces [employer] patient portal. Now you can access parts of your medical record, email your doctor's office, and request medication refills online.    
 
1. In your internet browser, go to [email or web address] 2. Click on the First Time User? Click Here link in the Sign In box. You will see the New Member Sign Up page. 3. Enter your [employer] Access Code exactly as it appears below. You will not need to use this code after youve completed the sign-up process. If you do not sign up before the expiration date, you must request a new code. · [employer] Access Code: [email or web address] Expires: 1/1/2018 10:52 AM 
 
4. Enter the last four digits of your Social Security Number (xxxx) and Date of Birth (mm/dd/yyyy) as indicated and click Submit. You will be taken to the next sign-up page. 5. Create a [employer] ID. This will be your [employer] login ID and cannot be changed, so think of one that is secure and easy to remember. 6. Create a [employer] password. You can change your password at any time. 7. Enter your Password Reset Question and Answer. This can be used at a later time if you forget your password. 8. Enter your e-mail address. You will receive e-mail notification when new information is available in [address] 9. Click Sign Up. You can now view and download portions of your medical record. 10. Click the Download Summary menu link to download a portable copy of your medical information. If you have questions, please visit the Frequently Asked Questions section of the [employer] website. Remember, [employer] is NOT to be used for urgent needs. For medical emergencies, dial 911. Now available from your iPhone and Android! Please provide this summary of care documentation to your next provider. Your primary care clinician is listed as [facility]. If you have any questions after today's visit, please call [phone].

## 2017-10-03 NOTE — PROGRESS NOTES
Chief Complaint   Patient presents with    Cholesterol Problem    Hypertension       Pt preferred language for health care discussion is english. Is someone accompanying this pt? no    Is the patient using any DME equipment during [address]? Yes, cane    Depression Screening:  PHQ over the last two weeks 5/11/2017 3/6/2017 8/15/2016 4/12/2016 4/13/2015 4/10/2014   Little interest or pleasure in doing things Not at all Not at all Not at all Not at all Not at all Not at all   Feeling down, depressed or hopeless Not at all Not at all Not at all Not at all Not at all Not at all   Total Score PHQ 2 0 0 0 0 0 0       Learning Assessment:  Learning Assessment 4/13/2015   PRIMARY LEARNER Patient   HIGHEST LEVEL OF EDUCATION - PRIMARY LEARNER  DID NOT GRADUATE [facility] PRIMARY LEARNER NONE   CO-LEARNER CAREGIVER No   PRIMARY LANGUAGE ENGLISH   LEARNER PREFERENCE PRIMARY DEMONSTRATION     LISTENING   ANSWERED BY patient   RELATIONSHIP SELF       Abuse Screening:  Abuse Screening Questionnaire 5/11/2017 4/13/2015   Do you ever feel afraid of your partner? N N   Are you in a relationship with someone who physically or mentally threatens you? N N   Is it safe for you to go home? Y Y       Fall Risk  Fall Risk Assessment, last 12 mths 10/3/2017 3/6/2017 8/15/2016 4/12/2016 5/15/2015   Able to walk? Yes Yes Yes Yes Yes   Fall in past 12 months? No No No No Yes   Fall with injury? - - - - No         Health Maintenance reviewed and discussed per provider. Yes    Pt is due for Glaucoma screening (getting release), Influenza getting today. Please order/place referral if appropriate. Pt currently taking Antiplatelet therapy? No     Advance Directive:  1. Do you have an advance directive in place? Patient Reply:no    2. If not, would you like material regarding how to put one in place? Patient Reply: yes    Coordination of Care:  1. Have you been to the ER, urgent care clinic since your last visit?  Hospitalized since your last visit? no    2. Have you seen or consulted any other health care providers outside of the [address] [place] [address] since your last visit? Include any pap smears or colon screening. no    Please see Red banners under Allergies, Med rec, Immunizations to remove outside inquires. All correct information has been verified with patient and added to chart.

## 2017-10-03 NOTE — PROGRESS NOTES
HISTORY OF PRESENT ILLNESS  Cyn Perry is a 79 y.o. female. Visit Vitals    /81 (BP 1 Location: Right arm, BP Patient Position: Sitting)    Pulse (!) 101  Comment: patient walked up our steps this morning    Temp 98 °F (36.7 °C) (Oral)    Resp 17    Ht 5' 2\" (1.575 m)    Wt 229 lb (103.9 kg)    SpO2 100%    BMI 41.88 kg/m2       Cholesterol Problem   The history is provided by the patient. This is a chronic problem. The current episode started more than 1 week ago. The problem occurs daily. The problem has not changed since onset. Hypertension    The history is provided by the patient. This is a chronic problem. The current episode started more than 1 week ago. The problem has not changed since onset. There are no associated agents to hypertension. Risk factors include obesity, stress and postmenopause. Review of Systems   Constitutional: Negative. Respiratory: Negative. Cardiovascular: Negative. Physical Exam   Constitutional: She is oriented to person, place, and time. She appears well-developed and well-nourished. No distress. Cardiovascular: Normal rate and regular rhythm. Pulmonary/Chest: Effort normal and breath sounds normal.   Musculoskeletal: She exhibits edema. Neurological: She is alert and oriented to person, place, and time. Skin: Skin is warm and dry. She is not diaphoretic. Very thick dark skin on legs c/w stasis dermatitis   Psychiatric: She has a normal mood and affect. Nursing note and vitals reviewed. ASSESSMENT and PLAN    ICD-10-CM ICD-9-CM    1. Essential hypertension C92 894.1 METABOLIC PANEL, COMPREHENSIVE   2. Hypercholesterolemia E78.00 272.0 LIPID PANEL   3. Chronic renal impairment, stage 3 (moderate) Z35.3 380.3 METABOLIC PANEL, COMPREHENSIVE   4.  Encounter for immunization Z23 V03.89 INFLUENZA VIRUS VACCINE, HIGH DOSE SEASONAL, PRESERVATIVE FREE      ADMIN INFLUENZA VIRUS VAC   5. Stasis dermatitis of both legs I87.2 454.1 ammonium lactate (LAC-HYDRIN) 12 % lotion       BP controlled    Update lab    Add lac-hytrin for her skin    Discussed BMI/weight, lifestyle, diet and exercise    F/u 4 months

## 2017-10-04 LAB
ALBUMIN SERPL-MCNC: 3.9 G/DL (ref 3.6–4.8)
ALBUMIN/GLOB SERPL: 1.1 {RATIO} (ref 1.2–2.2)
ALP SERPL-CCNC: 79 IU/L (ref 39–117)
ALT SERPL-CCNC: 7 IU/L (ref 0–32)
AST SERPL-CCNC: 13 IU/L (ref 0–40)
BILIRUB SERPL-MCNC: <0.2 MG/DL (ref 0–1.2)
BUN SERPL-MCNC: 35 MG/DL (ref 8–27)
BUN/CREAT SERPL: 23 (ref 12–28)
CALCIUM SERPL-MCNC: 9.3 MG/DL (ref 8.7–10.3)
CHLORIDE SERPL-SCNC: 106 MMOL/L (ref 96–106)
CHOLEST SERPL-MCNC: 174 MG/DL (ref 100–199)
CO2 SERPL-SCNC: 24 MMOL/L (ref 18–29)
CREAT SERPL-MCNC: 1.53 MG/DL (ref 0.57–1)
GLOBULIN SER CALC-MCNC: 3.6 G/DL (ref 1.5–4.5)
GLUCOSE SERPL-MCNC: 107 MG/DL (ref 65–99)
HDLC SERPL-MCNC: 59 MG/DL
INTERPRETATION, 910389: NORMAL
LDLC SERPL CALC-MCNC: 100 MG/DL (ref 0–99)
POTASSIUM SERPL-SCNC: 4.9 MMOL/L (ref 3.5–5.2)
PROT SERPL-MCNC: 7.5 G/DL (ref 6–8.5)
SODIUM SERPL-SCNC: 143 MMOL/L (ref 134–144)
TRIGL SERPL-MCNC: 76 MG/DL (ref 0–149)
VLDLC SERPL CALC-MCNC: 15 MG/DL (ref 5–40)

## 2017-10-31 ENCOUNTER — TELEPHONE (OUTPATIENT)
Dept: INTERNAL MEDICINE CLINIC | Age: 67
End: 2017-10-31

## 2017-10-31 DIAGNOSIS — N18.30 CHRONIC RENAL IMPAIRMENT, STAGE 3 (MODERATE) (HCC): Chronic | ICD-10-CM

## 2017-10-31 DIAGNOSIS — I87.2 VENOUS STASIS DERMATITIS OF BOTH LOWER EXTREMITIES: Primary | ICD-10-CM

## 2017-11-27 ENCOUNTER — HOSPITAL ENCOUNTER (OUTPATIENT)
Dept: MAMMOGRAPHY | Age: 67
Discharge: HOME | End: 2017-11-27
Attending: INTERNAL MEDICINE
Payer: MEDICARE

## 2017-11-27 DIAGNOSIS — Z12.31 VISIT FOR SCREENING MAMMOGRAM: ICD-10-CM

## 2017-11-27 PROCEDURE — 77063 BREAST TOMOSYNTHESIS BI: CPT

## 2018-01-31 RX ORDER — RANITIDINE 150 MG/1
TABLET, FILM COATED ORAL
Qty: 30 TAB | Refills: 5 | Status: SHIPPED | OUTPATIENT
Start: 2018-01-31 | End: 2018-08-08 | Stop reason: SDUPTHER

## 2018-02-01 DIAGNOSIS — Z76.0 MEDICATION REFILL: ICD-10-CM

## 2018-02-01 RX ORDER — SIMVASTATIN 20 MG/1
TABLET, FILM COATED ORAL
Qty: 30 TAB | Refills: 5 | Status: SHIPPED | OUTPATIENT
Start: 2018-02-01 | End: 2018-08-08 | Stop reason: SDUPTHER

## 2018-03-05 DIAGNOSIS — I10 ESSENTIAL HYPERTENSION: Chronic | ICD-10-CM

## 2018-03-05 RX ORDER — AMLODIPINE BESYLATE 10 MG/1
TABLET ORAL
Qty: 30 TAB | Refills: 5 | Status: SHIPPED | OUTPATIENT
Start: 2018-03-05 | End: 2018-09-10 | Stop reason: SDUPTHER

## 2018-04-05 RX ORDER — VALSARTAN AND HYDROCHLOROTHIAZIDE 160; 12.5 MG/1; MG/1
TABLET, FILM COATED ORAL
Qty: 30 TAB | Refills: 5 | Status: SHIPPED | OUTPATIENT
Start: 2018-04-05 | End: 2018-08-08

## 2018-04-17 ENCOUNTER — HOSPITAL ENCOUNTER (OUTPATIENT)
Dept: LAB | Age: 68
Discharge: HOME | End: 2018-04-17

## 2018-04-17 ENCOUNTER — OFFICE VISIT (OUTPATIENT)
Dept: INTERNAL MEDICINE CLINIC | Age: 68
End: 2018-04-17

## 2018-04-17 VITALS
OXYGEN SATURATION: 100 % | BODY MASS INDEX: 43.84 KG/M2 | TEMPERATURE: 97.3 F | SYSTOLIC BLOOD PRESSURE: 137 MMHG | RESPIRATION RATE: 16 BRPM | WEIGHT: 238.2 LBS | HEIGHT: 62 IN | HEART RATE: 109 BPM | DIASTOLIC BLOOD PRESSURE: 67 MMHG

## 2018-04-17 DIAGNOSIS — I87.2 STASIS DERMATITIS OF BOTH LEGS: ICD-10-CM

## 2018-04-17 DIAGNOSIS — E66.01 OBESITY, MORBID (HCC): ICD-10-CM

## 2018-04-17 DIAGNOSIS — N18.30 CHRONIC RENAL IMPAIRMENT, STAGE 3 (MODERATE) (HCC): Chronic | ICD-10-CM

## 2018-04-17 DIAGNOSIS — Z13.5 SCREENING FOR GLAUCOMA: ICD-10-CM

## 2018-04-17 DIAGNOSIS — I73.9 PAD (PERIPHERAL ARTERY DISEASE) (HCC): ICD-10-CM

## 2018-04-17 DIAGNOSIS — E78.00 HYPERCHOLESTEROLEMIA: Chronic | ICD-10-CM

## 2018-04-17 DIAGNOSIS — I10 ESSENTIAL HYPERTENSION: Primary | Chronic | ICD-10-CM

## 2018-04-17 PROCEDURE — 99001 SPECIMEN HANDLING PT-LAB: CPT | Performed by: INTERNAL MEDICINE

## 2018-04-17 RX ORDER — CLOBETASOL PROPIONATE 0.5 MG/G
OINTMENT TOPICAL 2 TIMES DAILY
COMMUNITY
End: 2018-06-04 | Stop reason: SDUPTHER

## 2018-04-17 RX ORDER — AMMONIUM LACTATE 12 G/100G
LOTION TOPICAL
Qty: 400 ML | Refills: 5 | Status: SHIPPED | OUTPATIENT
Start: 2018-04-17 | End: 2019-08-16 | Stop reason: SDUPTHER

## 2018-04-17 NOTE — PROGRESS NOTES
Identified pt with two pt identifiers(name and ). Reviewed record in preparation for visit and have obtained necessary documentation. Chief Complaint   Patient presents with    Hypertension    Cholesterol Problem        There are no preventive care reminders to display for this patient. Coordination of Care Questionnaire:  :   1) Have you been to an emergency room, urgent care clinic since your last visit? no   Hospitalized since your last visit? no             2. Have seen or consulted any other health care provider since your last visit? YES  If yes, where when, and reason for visit?    17: Mammo    Patient is accompanied by self I have received verbal consent from [address] Borges to discuss any/all medical information while they are present in the room.

## 2018-04-17 NOTE — MR AVS SNAPSHOT
[address] [place] [address] 
 
 
 [address] [address] 
[phone] Patient: Alexey Rendon MRN: [id number] YCD:[id number] Visit Information Date & Time Provider Department Dept. Phone Encounter #  
 4/17/2018  9:45 AM Garland De La Paz MD [address] [phone] [id number] Follow-up Instructions Return in about 4 months (around 8/17/2018) for Medicare Wellness Visit, htn, cholesterol. Your Appointments 4/17/2018  9:45 AM  
Office Visit with MD DEEDEE Manzano [facility]) Appt Note: 6 MONTH FU; PT R/S FROM 04/10/2018 FOR LATER TIME; Rescheduling Appointment From 04/10/2018; Confirmed - tlb  
 [address] [facility]  
  
   
 [address] [facility] Upcoming Health Maintenance Date Due  
 GLAUCOMA SCREENING Q2Y 10/14/2018* MEDICARE YEARLY EXAM 5/12/2018 BREAST CANCER SCRN MAMMOGRAM 11/27/2019 COLONOSCOPY 2/1/2020 DTaP/Tdap/Td series (2 - Td) 9/28/2021 *Topic was postponed. The date shown is not the original due date. Allergies as of 4/17/2018  Review Complete On: 4/17/2018 By: Garland De La Paz MD  
 No Known Allergies Current Immunizations  Reviewed on 4/10/2018 Name Date Influenza High Dose Vaccine PF 10/3/2017, 11/4/2016 12:35 PM, 10/8/2015  2:30 PM  
 Pneumococcal Conjugate (PCV-13) 4/12/2016 11:48 AM  
 Pneumococcal Polysaccharide (PPSV-23) 5/11/2017  3:35 PM  
 Tdap 9/28/2011 Not reviewed this visit You Were Diagnosed With   
  
 Codes Comments Essential hypertension    -  Primary ICD-10-CM: I10 
ICD-9-CM: 401.9 Hypercholesterolemia     ICD-10-CM: E78.00 ICD-9-CM: 272.0 Chronic renal impairment, stage 3 (moderate)     ICD-10-CM: N18.3 ICD-9-CM: 738. 3 Stasis dermatitis of both legs     ICD-10-CM: I87.2 ICD-9-CM: 454.1 PAD (peripheral artery disease) (HCC)     ICD-10-CM: I73.9 ICD-9-CM: 443.9 Screening for glaucoma     ICD-10-CM: Z13.5 ICD-9-CM: V80.1 Vitals BP Pulse Temp Resp Height(growth percentile) Weight(growth percentile)  
 137/67 (BP 1 Location: Right arm) (!) 109 97.3 °F (36.3 °C) 16 5' 2\" (1.575 m) 238 lb 3.2 oz (108 kg) SpO2 BMI OB Status Smoking Status 100% 43.57 kg/m2 Postmenopausal Former Smoker Vitals History BMI and BSA Data Body Mass Index Body Surface Area  
 43.57 kg/m 2 2.17 m 2 Preferred Pharmacy Pharmacy Name Phone [address]  [phone] Your Updated Medication List  
  
   
This list is accurate as of 4/17/18  9:23 AM.  Always use your most recent med list. amLODIPine 10 mg tablet Commonly known as:  Ceec Pall TAKE 1 TABLET BY MOUTH DAILY. INDICATIONS: HYPERTENSION  
  
 ammonium lactate 12 % lotion Commonly known as:  LAC-HYDRIN Apply daily to legs after bathing  
  
 betamethasone valerate 0.1 % topical cream  
Commonly known as:  Lo Casi Apply  to affected area two (2) times a day. clobetasol 0.05 % ointment Commonly known as:  Stephanie Cocking Apply  to affected area two (2) times a day. raNITIdine 150 mg tablet Commonly known as:  ZANTAC TAKE 1 TABLET BY MOUTH DAILY. INDICATIONS: GASTROESOPHAGEAL REFLUX  
  
 simvastatin 20 mg tablet Commonly known as:  ZOCOR  
TAKE 1 TABLET BY MOUTH NIGHTLY. INDICATIONS: HYPERLIPIDEMIA  
  
 valsartan-hydroCHLOROthiazide 160-12.5 mg per tablet Commonly known as:  DIOVAN-HCT  
TAKE 1 TABLET BY MOUTH DAILY. varicella-zoster vacine live 19,400 unit/0.65 mL Susr injection Generic drug:  varicella zoster vaccine live Prescriptions Sent to Pharmacy Refills  
 ammonium lactate (LAC-HYDRIN) 12 % lotion 5 Sig: Apply daily to legs after bathing  Class: Normal  
 Pharmacy: [address] #: [phone] We Performed the Following REFERRAL TO OPTOMETRY [id number] Custom] REFERRAL TO VASCULAR SURGERY [QVC210 Custom] Comments:  
 Pt with non healing stasis dermatitis. Noted to have abnormal BREANNE with ratio 0.65 on LLE,  0.85 on the right Follow-up Instructions Return in about 4 months (around 8/17/2018) for Medicare Wellness Visit, htn, cholesterol. To-Do List   
 04/17/2018 Lab:  LIPID PANEL   
  
 04/17/2018 Lab:  METABOLIC PANEL, BASIC Referral Information Referral ID Referred By Referred To  
  
 [id number] [address], [address], [facility] [address] [place], [facility] Phone: [phone] Fax: [phone] Visits Status Start Date End Date 1 New Request 4/17/18 4/17/19 If your referral has a status of pending review or denied, additional information will be sent to support the outcome of this decision. Referral ID Referred By Referred To  
 [id number] MD [address]  
   [address] [facility] Phone: [phone] Fax: [phone] Visits Status Start Date End Date 1 New Request 4/17/18 4/17/19 If your referral has a status of pending review or denied, additional information will be sent to support the outcome of this decision. Introducing [facility]! Latonia Ramirez introduces [employer] patient portal. Now you can access parts of your medical record, email your doctor's office, and request medication refills online. 1. In your internet browser, go to [email or web address] 2. Click on the First Time User? Click Here link in the Sign In box. You will see the New Member Sign Up page. 3. Enter your [employer] Access Code exactly as it appears below.  You will not need to use this code after youve completed the sign-up process. If you do not sign up before the expiration date, you must request a new code. · [email or web address] Access Code: [email or web address] Expires: 7/16/2018  9:23 AM 
 
4. Enter the last four digits of your Social Security Number (xxxx) and Date of Birth (mm/dd/yyyy) as indicated and click Submit. You will be taken to the next sign-up page. 5. Create a [email or web address] ID. This will be your [email or web address] login ID and cannot be changed, so think of one that is secure and easy to remember. 6. Create a [email or web address] password. You can change your password at any time. 7. Enter your Password Reset Question and Answer. This can be used at a later time if you forget your password. 8. Enter your e-mail address. You will receive e-mail notification when new information is available in [address] 9. Click Sign Up. You can now view and download portions of your medical record. 10. Click the Download Summary menu link to download a portable copy of your medical information. If you have questions, please visit the Frequently Asked Questions section of the [email or web address] website. Remember, [email or web address] is NOT to be used for urgent needs. For medical emergencies, dial 911. Now available from your iPhone and Android! Please provide this summary of care documentation to your next provider. Your primary care clinician is listed as [facility]. If you have any questions after today's visit, please call [phone].

## 2018-04-17 NOTE — PROGRESS NOTES
HISTORY OF PRESENT ILLNESS  Cyn Perry is a 79 y.o. female. Visit Vitals    /67 (BP 1 Location: Right arm)    Pulse (!) 109    Temp 97.3 °F (36.3 °C)    Resp 16    Ht 5' 2\" (1.575 m)    Wt 238 lb 3.2 oz (108 kg)    SpO2 100%    BMI 43.57 kg/m2       HPI Comments: walks up step today. She tries to do that at least once every day    Hypertension    The history is provided by the patient. This is a chronic problem. The current episode started more than 1 week ago. The problem has not changed since onset. Pertinent negatives include no chest pain, no palpitations and no shortness of breath. There are no associated agents to hypertension. Risk factors include obesity and a sedentary lifestyle. Cholesterol Problem   The history is provided by the patient. This is a chronic problem. The current episode started more than 1 week ago. The problem occurs daily. The problem has not changed since onset. Pertinent negatives include no chest pain and no shortness of breath. Exacerbated by: diet. The symptoms are relieved by medications (diet). Treatments tried: statin. The treatment provided moderate relief. Review of Systems   Constitutional: Negative. Respiratory: Negative for shortness of breath. Cardiovascular: Negative for chest pain and palpitations. Skin: Positive for itching and rash. Severe dermatitis       Physical Exam   Constitutional: She is oriented to person, place, and time. She appears well-developed and well-nourished. No distress. Cardiovascular: Normal rate and regular rhythm. Pulmonary/Chest: Effort normal and breath sounds normal.   Musculoskeletal: She exhibits no edema. Neurological: She is alert and oriented to person, place, and time. Skin: Skin is warm and dry. She is not diaphoretic. Very thick elephant skin like lower extremities. Psychiatric: She has a normal mood and affect. Nursing note and vitals reviewed.       ASSESSMENT and PLAN    ICD-10-CM ICD-9-CM    1. Essential hypertension I49 412.8 METABOLIC PANEL, BASIC   2. Hypercholesterolemia E78.00 272.0 LIPID PANEL   3. Chronic renal impairment, stage 3 (moderate) G67.9 754.2 METABOLIC PANEL, BASIC   4. Stasis dermatitis of both legs I87.2 454.1 ammonium lactate (LAC-HYDRIN) 12 % lotion   5. PAD (peripheral artery disease) (HCC) I73.9 443.9 REFERRAL TO VASCULAR SURGERY   6. Screening for glaucoma Z13.5 V80.1 REFERRAL TO OPTOMETRY       BP controlled    Update lab    Ref to optometry    Discussed BMI/weight, lifestyle, diet and exercise. Discussed effect on blood pressure, blood sugar, and joints especially  Focus on limiting white carbs, portion control, and moving more.     F/u 4 months for MWV., HTN, CHOL

## 2018-04-17 NOTE — ASSESSMENT & PLAN NOTE
Worsening, based on history, physical exam and review of pertinent labs, studies and medications; meds reconciled; lifestyle modifications recommended. Key Obesity Meds     Patient is on no anti-obesity meds.         Lab Results   Component Value Date/Time    Glucose 107 10/03/2017 12:00 AM    Cholesterol, total 174 10/03/2017 12:00 AM    HDL Cholesterol 59 10/03/2017 12:00 AM    LDL, calculated 100 10/03/2017 12:00 AM    Triglyceride 76 10/03/2017 12:00 AM    Sodium 143 10/03/2017 12:00 AM    Potassium 4.9 10/03/2017 12:00 AM    ALT (SGPT) 7 10/03/2017 12:00 AM    AST (SGOT) 13 10/03/2017 12:00 AM

## 2018-04-18 DIAGNOSIS — N18.30 CHRONIC RENAL IMPAIRMENT, STAGE 3 (MODERATE) (HCC): Primary | Chronic | ICD-10-CM

## 2018-04-18 LAB
BUN SERPL-MCNC: 26 MG/DL (ref 8–27)
BUN/CREAT SERPL: 14 (ref 12–28)
CALCIUM SERPL-MCNC: 9.3 MG/DL (ref 8.7–10.3)
CHLORIDE SERPL-SCNC: 102 MMOL/L (ref 96–106)
CHOLEST SERPL-MCNC: 168 MG/DL (ref 100–199)
CO2 SERPL-SCNC: 22 MMOL/L (ref 18–29)
CREAT SERPL-MCNC: 1.84 MG/DL (ref 0.57–1)
GFR SERPLBLD CREATININE-BSD FMLA CKD-EPI: 28 ML/MIN/1.73
GFR SERPLBLD CREATININE-BSD FMLA CKD-EPI: 32 ML/MIN/1.73
GLUCOSE SERPL-MCNC: 101 MG/DL (ref 65–99)
HDLC SERPL-MCNC: 56 MG/DL
INTERPRETATION, 910389: NORMAL
LDLC SERPL CALC-MCNC: 92 MG/DL (ref 0–99)
POTASSIUM SERPL-SCNC: 4.2 MMOL/L (ref 3.5–5.2)
SODIUM SERPL-SCNC: 143 MMOL/L (ref 134–144)
TRIGL SERPL-MCNC: 101 MG/DL (ref 0–149)
VLDLC SERPL CALC-MCNC: 20 MG/DL (ref 5–40)

## 2018-05-22 ENCOUNTER — PATIENT OUTREACH (OUTPATIENT)
Dept: INTERNAL MEDICINE CLINIC | Age: 68
End: 2018-05-22

## 2018-05-22 NOTE — PROGRESS NOTES
Hospital Discharge Follow-Up      Date/Time:  2018 11:04 AM        Called pt today - She is on her way to doctor's office for appt so I told her I would call her back this afternoon. Patient called back at 4pm.      Patient was admitted to [facility] on 18 and discharged on 18 for Cellulitis, legs weeping, edema . The physician discharge summary was not available at the time of outreach. Patient was contacted within 2 business days of discharge. Top Challenges reviewed with the provider   none         Method of communication with provider :chart routing    Inpatient RRAT score: 12 Low Risk  Was this a readmission? no   Patient stated reason for the readmission: n/a    Nurse Navigator (NN) contacted the patient by telephone to perform post hospital discharge assessment. The patient called back this afternoon . I verified name and  with patient as identifiers. Provided introduction to self, and explanation of the Nurse Navigator role. Reviewed discharge instructions and red flags with patient who verbalized understanding. Patient given an opportunity to ask questions and does not have any further questions or concerns at this time. The patient agrees to contact the PCP office for questions related to their healthcare. NN provided contact information for future reference. Summary of patients top problems:  1. Cellulitis - could reoccur easily due to morbid obesity  2. Limited support - lives alone and only has one sister that is able to help her  3. Home Health orders at discharge: None  [address]: none  Date of initial visit: none    Durable Medical Equipment ordered/company: has walker  Durable Medical Equipment received: none    Barriers to care?  support system, transportation - has only sister and she can only provide limited transportation    Advance Care Planning:   Does patient have an Advance Directive:  not on file       Medication:     New Medications at Discharge: Temovate, Benadryl, Cortizone cream,   Changed Medications at Discharge: none  Discontinued Medications at Discharge: none    Medication reconciliation was not performed at this time. Patient is very difficult to understand over the phone and feel would be better to do this in person. She did state that she got her new Rx filled and I verified using dispense history the meds the patient is currently taking. There were no barriers to obtaining medications identified at this time. Referral to Pharm D needed: no     Current Outpatient Prescriptions   Medication Sig    clobetasol (TEMOVATE) 0.05 % ointment Apply  to affected area two (2) times a day.  ammonium lactate (LAC-HYDRIN) 12 % lotion Apply daily to legs after bathing    triamcinolone acetonide (KENALOG) 0.1 % topical cream Apply  to affected area two (2) times a day.  valsartan-hydroCHLOROthiazide (DIOVAN-HCT) 160-12.5 mg per tablet TAKE 1 TABLET BY MOUTH DAILY.  amLODIPine (NORVASC) 10 mg tablet TAKE 1 TABLET BY MOUTH DAILY. INDICATIONS: HYPERTENSION    simvastatin (ZOCOR) 20 mg tablet TAKE 1 TABLET BY MOUTH NIGHTLY. INDICATIONS: HYPERLIPIDEMIA    raNITIdine (ZANTAC) 150 mg tablet TAKE 1 TABLET BY MOUTH DAILY. INDICATIONS: GASTROESOPHAGEAL REFLUX    VARICELLA-ZOSTER VIRUS INFECTION PROPHYLAXIS 19,400 unit/0.65 mL susr injection      No current facility-administered medications for this visit. There are no discontinued medications. PCP/Specialist follow up:   Future Appointments  Date Time Provider Clare Chase   6/4/2018 3:00 PM Julia Montoya MD [facility] Supportive resources in place to maintain patient in the community (ie. Home Health, DME equipment, refer to, medication assistant plan, etc.)      Understands red flags post discharge.

## 2018-06-01 ENCOUNTER — PATIENT OUTREACH (OUTPATIENT)
Dept: INTERNAL MEDICINE CLINIC | Age: 68
End: 2018-06-01

## 2018-06-01 NOTE — PROGRESS NOTES
Follow-Up      Date/Time:  2018 1:48 PM    History:  Patient was admitted to [facility] on 18 and discharged on 18 for Cellulitis. Nurse Navigator (NN) contacted the patient by telephone in follow up. Verified name and  with patient as identifiers. Patient states she is doing well. She has an appointment on Monday with Dr. Mauricio Thrasher and I have instructed her to bring all of her medication with her so that we can do a thorough medication review. She states her legs are getting better all the time and she feels good. Update on   Goals      Supportive resources in place to maintain patient in the community (ie. Home Health, DME equipment, refer to, medication assistant plan, etc.)      Understands red flags post discharge. PCP/Specialist follow up: Future Appointments  Date Time Provider Clare Chase   2018 3:00 PM Julia Montoya MD [address] [place] [address]          Will continue to follow for now.

## 2018-06-04 ENCOUNTER — PATIENT OUTREACH (OUTPATIENT)
Dept: INTERNAL MEDICINE CLINIC | Age: 68
End: 2018-06-04

## 2018-06-04 ENCOUNTER — OFFICE VISIT (OUTPATIENT)
Dept: INTERNAL MEDICINE CLINIC | Age: 68
End: 2018-06-04

## 2018-06-04 VITALS
TEMPERATURE: 98.3 F | HEART RATE: 97 BPM | DIASTOLIC BLOOD PRESSURE: 56 MMHG | SYSTOLIC BLOOD PRESSURE: 110 MMHG | WEIGHT: 218 LBS | BODY MASS INDEX: 40.12 KG/M2 | HEIGHT: 62 IN | RESPIRATION RATE: 18 BRPM | OXYGEN SATURATION: 97 %

## 2018-06-04 DIAGNOSIS — I87.2 VENOUS STASIS DERMATITIS OF BOTH LOWER EXTREMITIES: ICD-10-CM

## 2018-06-04 DIAGNOSIS — L03.119 CELLULITIS OF LOWER EXTREMITY, UNSPECIFIED LATERALITY: ICD-10-CM

## 2018-06-04 DIAGNOSIS — Z09 HOSPITAL DISCHARGE FOLLOW-UP: Primary | ICD-10-CM

## 2018-06-04 RX ORDER — HYDROCORTISONE 25 MG/G
CREAM TOPICAL 2 TIMES DAILY
Qty: 30 G | Refills: 1 | Status: SHIPPED | OUTPATIENT
Start: 2018-06-04 | End: 2019-08-16

## 2018-06-04 RX ORDER — CLOBETASOL PROPIONATE 0.5 MG/G
OINTMENT TOPICAL 2 TIMES DAILY
Qty: 30 G | Refills: 5 | Status: SHIPPED | OUTPATIENT
Start: 2018-06-04 | End: 2018-11-08 | Stop reason: SDUPTHER

## 2018-06-04 NOTE — MR AVS SNAPSHOT
[address] [place] [address] 
 
 
 [address] [facility] [address] [address] 
[phone] Patient: Erin Torres MRN: [id number] SIF:[birth year] Visit Information Date & Time Provider Department Dept. Phone Encounter #  
 6/4/2018  3:00 PM [address] [phone] [id number] Follow-up Instructions Return in about 3 months (around 9/4/2018) for Medicare Wellness Visit, htn, cholesterol. Upcoming Health Maintenance Date Due  
 MEDICARE YEARLY EXAM 5/12/2018 GLAUCOMA SCREENING Q2Y 10/14/2018* Influenza Age 5 to Adult 8/1/2018 BREAST CANCER SCRN MAMMOGRAM 11/27/2019 COLONOSCOPY 2/1/2020 DTaP/Tdap/Td series (2 - Td) 9/28/2021 *Topic was postponed. The date shown is not the original due date. Allergies as of 6/4/2018  Review Complete On: 6/4/2018 By: John Paul Toure MD  
 No Known Allergies Current Immunizations  Reviewed on 4/10/2018 Name Date Influenza High Dose Vaccine PF 10/3/2017, 11/4/2016 12:35 PM, 10/8/2015  2:30 PM  
 Pneumococcal Conjugate (PCV-13) 4/12/2016 11:48 AM  
 Pneumococcal Polysaccharide (PPSV-23) 5/11/2017  3:35 PM  
 Tdap 9/28/2011 Not reviewed this visit You Were Diagnosed With   
  
 Codes Comments Cellulitis of lower extremity, unspecified laterality    -  Primary ICD-10-CM: L03.119 ICD-9-CM: 682.6 Hospital discharge follow-up     ICD-10-CM: [facility] ICD-9-CM: V67.59 Venous stasis dermatitis of both lower extremities     ICD-10-CM: I87.2 ICD-9-CM: 454.1 Vitals BP Pulse Temp Resp Height(growth percentile) Weight(growth percentile) 110/56 (BP 1 Location: Right arm, BP Patient Position: Sitting) 97 98.3 °F (36.8 °C) (Oral) 18 5' 2\" (1.575 m) 218 lb (98.9 kg) SpO2 BMI OB Status Smoking Status 97% 39.87 kg/m2 Postmenopausal Former Smoker Vitals History BMI and BSA Data Body Mass Index Body Surface Area 39.87 kg/m 2 2.08 m 2 Preferred Pharmacy Pharmacy Name Phone [facility] [address] [place] [address]  [phone] Your Updated Medication List  
  
   
This list is accurate as of 6/4/18  3:19 PM.  Always use your most recent med list. amLODIPine 10 mg tablet Commonly known as:  [place] TAKE 1 TABLET BY MOUTH DAILY. INDICATIONS: HYPERTENSION  
  
 ammonium lactate 12 % lotion Commonly known as:  LAC-HYDRIN Apply daily to legs after bathing  
  
 clobetasol 0.05 % ointment Commonly known as:  Orbie Deter Apply  to affected area two (2) times a day. hydrocortisone 2.5 % topical cream  
Commonly known as:  HYTONE Apply  to affected area two (2) times a day. use thin layer on neck and eyelids  
  
 raNITIdine 150 mg tablet Commonly known as:  ZANTAC TAKE 1 TABLET BY MOUTH DAILY. INDICATIONS: GASTROESOPHAGEAL REFLUX  
  
 simvastatin 20 mg tablet Commonly known as:  ZOCOR  
TAKE 1 TABLET BY MOUTH NIGHTLY. INDICATIONS: HYPERLIPIDEMIA  
  
 triamcinolone acetonide 0.1 % topical cream  
Commonly known as:  KENALOG Apply  to affected area two (2) times a day. valsartan-hydroCHLOROthiazide 160-12.5 mg per tablet Commonly known as:  DIOVAN-HCT  
TAKE 1 TABLET BY MOUTH DAILY. varicella-zoster vacine live 19,400 unit/0.65 mL Susr injection Generic drug:  varicella zoster vaccine live Prescriptions Sent to Pharmacy Refills  
 clobetasol (TEMOVATE) 0.05 % ointment 5 Sig: Apply  to affected area two (2) times a day. Class: Normal  
 Pharmacy: [facility] [place] [address] Ph #: [phone] Route: Topical  
 hydrocortisone (HYTONE) 2.5 % topical cream 1 Sig: Apply  to affected area two (2) times a day. use thin layer on neck and eyelids Class: Normal  
 Pharmacy: [facility] [place] [address] Ph #: [phone]  Route: Topical  
  
 Follow-up Instructions Return in about 3 months (around 9/4/2018) for Medicare Wellness Visit, htn, cholesterol. Introducing [facility]! [employer] introduces [employer] patient portal. Now you can access parts of your medical record, email your doctor's office, and request medication refills online. 1. In your internet browser, go to [email or web address] 2. Click on the First Time User? Click Here link in the Sign In box. You will see the New Member Sign Up page. 3. Enter your [employer] Access Code exactly as it appears below. You will not need to use this code after youve completed the sign-up process. If you do not sign up before the expiration date, you must request a new code. · [employer] Access Code: [email or web address] Expires: 7/16/2018  9:23 AM 
 
4. Enter the last four digits of your Social Security Number (xxxx) and Date of Birth (mm/dd/yyyy) as indicated and click Submit. You will be taken to the next sign-up page. 5. Create a [employer] ID. This will be your [employer] login ID and cannot be changed, so think of one that is secure and easy to remember. 6. Create a [employer] password. You can change your password at any time. 7. Enter your Password Reset Question and Answer. This can be used at a later time if you forget your password. 8. Enter your e-mail address. You will receive e-mail notification when new information is available in [address] 9. Click Sign Up. You can now view and download portions of your medical record. 10. Click the Download Summary menu link to download a portable copy of your medical information. If you have questions, please visit the Frequently Asked Questions section of the [employer] website. Remember, [employer] is NOT to be used for urgent needs. For medical emergencies, dial 911. Now available from your iPhone and Android! Please provide this summary of care documentation to your next provider. Your primary care clinician is listed as [facility]. If you have any questions after today's visit, please call [phone].

## 2018-06-04 NOTE — PROGRESS NOTES
HISTORY OF PRESENT ILLNESS  Cyn Perry is a 76 y.o. female. Visit Vitals    /56 (BP 1 Location: Right arm, BP Patient Position: Sitting)    Pulse 97    Temp 98.3 °F (36.8 °C) (Oral)    Resp 18    Ht 5' 2\" (1.575 m)    Wt 218 lb (98.9 kg)    SpO2 97%    BMI 39.87 kg/m2       HPI Comments: Was in the hospital with cellulitis of her leg with stasis dermatitis. Initially thought to be cellulitis, but derm said no so no antibiotics were sent home with her. She reports legs feel better. She has been using her clobetasol propionate on her legs. She is using support stocking but the medical stocking she was given are too tight and uncomfortable    Hospital Follow Up   The history is provided by the patient (see comments). This is a new problem. Review of Systems   Constitutional:        Feels well today   Cardiovascular: Positive for leg swelling. Musculoskeletal: Positive for joint pain. All other systems reviewed and are negative. Physical Exam   Constitutional: She is oriented to person, place, and time. She appears well-developed and well-nourished. No distress. Cardiovascular: Normal rate. Pulmonary/Chest: Effort normal.   Neurological: She is alert and oriented to person, place, and time. Skin: Skin is warm and dry. She is not diaphoretic. Very thick skin on lower extremities. Not nearly as much scaling as before. No broken skin. No as much edema. She has thick ankles at baseline. Nursing note and vitals reviewed. ASSESSMENT and PLAN    ICD-10-CM ICD-9-CM    1. Hospital discharge follow-up Z09 V67.59    2. Venous stasis dermatitis of both lower extremities I87.2 454.1 clobetasol (TEMOVATE) 0.05 % ointment      hydrocortisone (HYTONE) 2.5 % topical cream   3.  Cellulitis of lower extremity, unspecified laterality L03.119 682.6 clobetasol (TEMOVATE) 0.05 % ointment      hydrocortisone (HYTONE) 2.5 % topical cream     Available hospital/ER record reviewed    She has also lost 20# !! Continue with her diet and portions. She feel swell and looks well. VS improved. Continue creams and elevation when she can for her legs.  Support stockings    F/u 3 months for MWV, HTN,

## 2018-06-04 NOTE — PROGRESS NOTES
ROOM # 4    Cyn Perry presents today for   Chief Complaint   Patient presents with   [facility] Follow Up     pt was in [facility] for bilateral leg swelling       Cyn Perry preferred language for health care discussion is english/other. Is someone accompanying this pt? no    Is the patient using any DME equipment during [address]? Yes, cane    Depression Screening:  PHQ over the last two weeks 6/4/2018 5/11/2017 3/6/2017 8/15/2016 4/12/2016 4/13/2015 4/10/2014   Little interest or pleasure in doing things Not at all Not at all Not at all Not at all Not at all Not at all Not at all   Feeling down, depressed or hopeless Not at all Not at all Not at all Not at all Not at all Not at all Not at all   Total Score PHQ 2 0 0 0 0 0 0 0       Learning Assessment:  Learning Assessment 4/13/2015   PRIMARY LEARNER Patient   HIGHEST LEVEL OF EDUCATION - PRIMARY LEARNER  DID NOT GRADUATE HIGH SCHOOL   BARRIERS PRIMARY LEARNER NONE   CO-LEARNER CAREGIVER No   PRIMARY LANGUAGE ENGLISH   LEARNER PREFERENCE PRIMARY DEMONSTRATION     LISTENING   ANSWERED BY patient   RELATIONSHIP SELF       Abuse Screening:  Abuse Screening Questionnaire 5/11/2017 4/13/2015   Do you ever feel afraid of your partner? N N   Are you in a relationship with someone who physically or mentally threatens you? N N   Is it safe for you to go home? Y Y       Fall Risk  Fall Risk Assessment, last 12 mths 6/4/2018 10/3/2017 3/6/2017 8/15/2016 4/12/2016 5/15/2015   Able to walk? Yes Yes Yes Yes Yes Yes   Fall in past 12 months? No No No No No Yes   Fall with injury? - - - - - No       Health Maintenance reviewed and discussed per provider. Yes    Cyn Perry is due for mwv. Please order/place referral if appropriate. Advance Directive:  1. Do you have an advance directive in place? Patient Reply: no    2. If not, would you like material regarding how to put one in place? Patient Reply: no    Coordination of Care:  1.  Have you been to the ER, urgent care clinic since your last visit? Hospitalized since your last visit? yes    2. Have you seen or consulted any other health care providers outside of the [facility] since your last visit? Include any pap smears or colon screening.  no

## 2018-06-08 NOTE — PROGRESS NOTES
Follow-Up      Date/Time:  6/4/2018 1:48 PM    History:  Patient was admitted to [facility] on 5/13/18 and discharged on 5/18/18 for Cellulitis. The patient was here to see Dr. Mónica Espinoza on Monday 6/4/18 at which time I talked with the patient. She states she is doing well. Her legs are wrapped but state they feel much better than they did and are barely draining any fluid at all. She states her weight is down 20 lbs and she is very happy about that. Update on   Goals      Supportive resources in place to maintain patient in the community (ie. Home Health, DME equipment, refer to, medication assistant plan, etc.)            Patient not receiving HH but has some family/friends  that have been helping her around the house. Her family will help provide transportation to appointments.  Understands red flags post discharge. Pt knows to monitor for increase drainage, \"weeping\"  From legs and to notify us if occurs. 6/5/18  Pt continue to monitor and is wrapping legs regularly. PCP/Specialist follow up: No future appointments. Will continue to follow for now.

## 2018-06-22 ENCOUNTER — PATIENT OUTREACH (OUTPATIENT)
Dept: INTERNAL MEDICINE CLINIC | Age: 68
End: 2018-06-22

## 2018-06-22 NOTE — PROGRESS NOTES
Follow-Up      Date/Time:  6/22/2018 1:59 PM    History:  Patient was admitted to [facility] on 5/14/18 and discharged on 5/18/18 for Cellulitis. She was in to see Dr. Molina Dias on 6/4/18 at which time she was still having some drainage from her legs but they were showing slow improvement. Today, pt states that they continue to improve. She is continuing to use the cream on her legs and actually needs more of the cream.  I verified that refill is available at pharmacy and pt will go pick this up. She does keep legs propped up as much as possible and tries to wear stockings as much as she can tolerate them.  (they are very tight per patient). Patient has graduated from the Transitions of Care Coordination  program on 6/22/18. Patient's symptoms are stable at this time. Patient/family has the ability to self-manage. Care management goals have been completed at this time. No further nurse navigator follow up scheduled. Goals Addressed      COMPLETED: Supportive resources in place to maintain patient in the community (ie. Home Health, DME equipment, refer to, medication assistant plan, etc.)                  Patient not receiving HH but has some family/friends  that have been helping her around the house. Her family will help provide transportation to appointments. 6/18/18 Continues to use community support (family and friends) when needing transportation.  COMPLETED: Understands red flags post discharge. Pt knows to monitor for increase drainage, \"weeping\"  From legs and to notify us if occurs. 6/5/18  Pt continue to monitor and is wrapping legs regularly. 6/22/18 Patient continues to use the medicated cream on legs and keeps elevated whenever sitting. Legs are better now than have been in some time per patient.               PCP/Specialist follow up: Future Appointments  Date Time Provider Clare Chase   7/3/2018 12:15 PM Saniya Mason Watt Fleischer, MD [address] [facility] [address]

## 2018-07-03 ENCOUNTER — OFFICE VISIT (OUTPATIENT)
Dept: INTERNAL MEDICINE CLINIC | Age: 68
End: 2018-07-03

## 2018-07-03 VITALS
TEMPERATURE: 96.4 F | HEART RATE: 96 BPM | DIASTOLIC BLOOD PRESSURE: 61 MMHG | WEIGHT: 216.2 LBS | RESPIRATION RATE: 16 BRPM | BODY MASS INDEX: 39.79 KG/M2 | OXYGEN SATURATION: 100 % | HEIGHT: 62 IN | SYSTOLIC BLOOD PRESSURE: 131 MMHG

## 2018-07-03 DIAGNOSIS — I10 HYPERTENSION, UNSPECIFIED TYPE: ICD-10-CM

## 2018-07-03 DIAGNOSIS — R79.89 ELEVATED SERUM CREATININE: Primary | ICD-10-CM

## 2018-07-03 DIAGNOSIS — Z00.00 MEDICARE ANNUAL WELLNESS VISIT, SUBSEQUENT: ICD-10-CM

## 2018-07-03 DIAGNOSIS — I87.2 VENOUS INSUFFICIENCY: ICD-10-CM

## 2018-07-03 NOTE — PATIENT INSTRUCTIONS
Medicare Wellness Visit, Female    The best way to live healthy is to have a lifestyle where you eat a well-balanced diet, exercise regularly, limit alcohol use, and quit all forms of tobacco/nicotine, if applicable. Regular preventive services are another way to keep healthy. Preventive services (vaccines, screening tests, monitoring & exams) can help personalize your care plan, which helps you manage your own care. Screening tests can find health problems at the earliest stages, when they are easiest to treat. [facility] follows the current, evidence-based guidelines published by the [facility] Rasheed Sonia ([facility]) when recommending preventive services for our patients. Because we follow these guidelines, sometimes recommendations change over time as research supports it. (For example, mammograms used to be recommended annually. Even though Medicare will still pay for an annual mammogram, the newer guidelines recommend a mammogram every two years for women of average risk.)    Of course, you and your provider may decide to screen more often for some diseases, based on your risk and co-morbidities (chronic disease you are already diagnosed with). Preventive services for you include:    - Medicare offers their members a free annual wellness visit, which is time for you and your primary care provider to discuss and plan for your preventive service needs. Take advantage of this benefit every year!    -All people over age 72 should receive the recommended pneumonia vaccines. Current USPSTF guidelines recommend a series of two vaccines for the best pneumonia protection.     -All adults should have a yearly flu vaccine and a tetanus vaccine every 10 years. All adults age 61 years should receive a shingles vaccine once in their lifetime.      -A bone mass density test is recommended when a woman turns 65 to screen for osteoporosis.  This test is only recommended once as a screening. Some providers will use this same test as a disease monitoring tool if you already have osteoporosis. -All adults age 38-68 years who are overweight should have a diabetes screening test once every three years.     -Other screening tests & preventive services for persons with diabetes include: an eye exam to screen for diabetic retinopathy, a kidney function test, a foot exam, and stricter control over your cholesterol.     -Cardiovascular screening for adults with routine risk involves an electrocardiogram (ECG) at intervals determined by the provider.     -Colorectal cancer screenings should be done for adults age 54-65 years with normal risk. There are a number of acceptable methods of screening for this type of cancer. Each test has its own benefits and drawbacks. Discuss with your provider what is most appropriate for you during your annual wellness visit. The different tests include: colonoscopy (considered the best screening method), a fecal occult blood test, a fecal DNA test, and sigmoidoscopy. -Breast cancer screenings are recommended every other year for women of normal risk age 54-69 years.     -Cervical cancer screenings for women over age 72 are only recommended with certain risk factors.     -All adults born between [facility] should be screened once for Hepatitis C.      Here is a list of your current Health Maintenance items (your personalized list of preventive services) with a due date:  Health Maintenance Due   Topic Date Due    Annual Well Visit  05/12/2018

## 2018-07-03 NOTE — PROGRESS NOTES
FAMILY MEDICINE CLINIC NOTE    S: The patient presents for follow up on HTN venous insufficiency for which she was hospitalized last month. She notes that her edema is at baseline, she has an appointment on 7/24/18 with her vascular specialist.     She reports no adverse side effects from her medication. She denies any recent angina, dyspnea, palpitations or edema. Current Outpatient Prescriptions on File Prior to Visit   Medication Sig Dispense Refill    clobetasol (TEMOVATE) 0.05 % ointment Apply  to affected area two (2) times a day. 30 g 5    hydrocortisone (HYTONE) 2.5 % topical cream Apply  to affected area two (2) times a day. use thin layer on neck and eyelids 30 g 1    ammonium lactate (LAC-HYDRIN) 12 % lotion Apply daily to legs after bathing 400 mL 5    triamcinolone acetonide (KENALOG) 0.1 % topical cream Apply  to affected area two (2) times a day. 15 g 0    valsartan-hydroCHLOROthiazide (DIOVAN-HCT) 160-12.5 mg per tablet TAKE 1 TABLET BY MOUTH DAILY. 30 Tab 5    amLODIPine (NORVASC) 10 mg tablet TAKE 1 TABLET BY MOUTH DAILY. INDICATIONS: HYPERTENSION 30 Tab 5    simvastatin (ZOCOR) 20 mg tablet TAKE 1 TABLET BY MOUTH NIGHTLY. INDICATIONS: HYPERLIPIDEMIA 30 Tab 5    raNITIdine (ZANTAC) 150 mg tablet TAKE 1 TABLET BY MOUTH DAILY. INDICATIONS: GASTROESOPHAGEAL REFLUX 30 Tab 5    VARICELLA-ZOSTER VIRUS INFECTION PROPHYLAXIS 19,400 unit/0.65 mL susr injection        No current facility-administered medications on file prior to visit.         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    H/O bone density study 10/3/11    WNL    Hiatal hernia     Hypercholesterolemia     Internal hemorrhoid     Obesity     PAD (peripheral artery disease) ([facility] [address] 03/26/2018    per [employer]    Pedal edema     Stasis dermatitis        Social History     Social History    Marital status: SINGLE     Spouse name: N/A    Number of children: N/A    Years of education: N/A     Occupational History    dio wheeler      Social History Main Topics    Smoking status: Former Smoker    Smokeless tobacco: Never Used    Alcohol use No    Drug use: No    Sexual activity: No     Other Topics Concern    Not on file     Social History Narrative       Family History   Problem Relation Age of Onset    Diabetes Mother     Diabetes Father        O:  Visit Vitals    /61 (BP 1 Location: Left arm, BP Patient Position: Sitting)    Pulse 96    Temp 96.4 °F (35.8 °C) (Oral)    Resp 16    Ht 5' 2\" (1.575 m)    Wt 216 lb 3.2 oz (98.1 kg)    SpO2 100%    BMI 39.54 kg/m2     NAD, comfortable  RRR, no murmurs  CTABL, no wheezing/ronchi/rales  abd soft ND NT normoactive BS  Venous insufficiency with hemosiderin changes of bilateral lower extremities  No open wounds or ulcers    76 y.o. female      ICD-10-CM ICD-9-CM    1. Medicare annual wellness visit, subsequent Z00.00 V70.0 Not due for colonoscopy   2. Elevated serum creatinine V54.44 076.80 METABOLIC PANEL, COMPREHENSIVE   3. Hypertension, unspecified type C90 775.1 METABOLIC PANEL, COMPREHENSIVE   4. Venous insufficiency I87.2 459.81 Follow up with vascular         This is the Subsequent Medicare Annual Wellness Exam, performed 12 months or more after the Initial AWV or the last Subsequent AWV    I have reviewed the patient's medical history in detail and updated the computerized patient record.      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 Renuka Dorado      dental problems     Current Outpatient Prescriptions   Medication Sig Dispense Refill    clobetasol (TEMOVATE) 0.05 % ointment Apply  to affected area two (2) times a day. 30 g 5    hydrocortisone (HYTONE) 2.5 % topical cream Apply  to affected area two (2) times a day. use thin layer on neck and eyelids 30 g 1    ammonium lactate (LAC-HYDRIN) 12 % lotion Apply daily to legs after bathing 400 mL 5    triamcinolone acetonide (KENALOG) 0.1 % topical cream Apply  to affected area two (2) times a day. 15 g 0    valsartan-hydroCHLOROthiazide (DIOVAN-HCT) 160-12.5 mg per tablet TAKE 1 TABLET BY MOUTH DAILY. 30 Tab 5    amLODIPine (NORVASC) 10 mg tablet TAKE 1 TABLET BY MOUTH DAILY. INDICATIONS: HYPERTENSION 30 Tab 5    simvastatin (ZOCOR) 20 mg tablet TAKE 1 TABLET BY MOUTH NIGHTLY. INDICATIONS: HYPERLIPIDEMIA 30 Tab 5    raNITIdine (ZANTAC) 150 mg tablet TAKE 1 TABLET BY MOUTH DAILY. INDICATIONS: GASTROESOPHAGEAL REFLUX 30 Tab 5    folic acid/multivit-min/lutein (CENTRUM SILVER PO) Take  by mouth.  VARICELLA-ZOSTER VIRUS INFECTION PROPHYLAXIS 19,400 unit/0.65 mL susr injection        No Known Allergies  Family History   Problem Relation Age of Onset    Diabetes Mother     Diabetes Father      Social History   Substance Use Topics    Smoking status: Former Smoker    Smokeless tobacco: Never Used    Alcohol use No     Patient Active Problem List   Diagnosis Code    Hypercholesterolemia E78.00    Essential hypertension I10    CRI (chronic renal insufficiency) N18.9    Obesity, morbid (HCC) E66.01       Depression Risk Factor Screening:     PHQ over the last two weeks 7/3/2018   Little interest or pleasure in doing things Not at all   Feeling down, depressed or hopeless Not at all   Total Score PHQ 2 0     Alcohol Risk Factor Screening: You do not drink alcohol or very rarely. Functional Ability and Level of Safety:   Hearing Loss  Hearing is good.     Activities of Daily Living  The home contains: no safety equipment. Patient does total self care    Fall Risk  Fall Risk Assessment, last 12 mths 7/3/2018   Able to walk? Yes   Fall in past 12 months? No   Fall with injury? -       Abuse Screen  Patient is not abused    Cognitive Screening   Evaluation of Cognitive Function:  Has your family/caregiver stated any concerns about your memory: no  Normal    Patient Care Team   Patient Care Team:  Sirena Hernandez MD as PCP - General (Internal Medicine)  Naomi Evans MD as Consulting Provider (Ophthalmology)  Tamia Rabago RN as Nurse Navigator    Assessment/Plan   Education and counseling provided:  Are appropriate based on today's review and evaluation    Diagnoses and all orders for this visit:    1. Medicare annual wellness visit, subsequent    2. Elevated serum creatinine  -     METABOLIC PANEL, COMPREHENSIVE; Future    3. Hypertension, unspecified type  -     METABOLIC PANEL, COMPREHENSIVE; Future    4.  Venous insufficiency        Health Maintenance Due   Topic Date Due    MEDICARE YEARLY EXAM  05/12/2018

## 2018-07-03 NOTE — MR AVS SNAPSHOT
[address] [place] [address] 
 
 
 [address] [facility] [address] [id number] 
[phone] Patient: Katerina Marino MRN: [id number] NTL:[birth year] Visit Information Date & Time Provider Department Dept. Phone Encounter #  
 7/3/2018  1:15 PM [address] [phone] Upcoming Health Maintenance Date Due  
 MEDICARE YEARLY EXAM 5/12/2018 GLAUCOMA SCREENING Q2Y 10/14/2018* Influenza Age 5 to Adult 8/1/2018 BREAST CANCER SCRN MAMMOGRAM 11/27/2019 COLONOSCOPY 2/1/2020 DTaP/Tdap/Td series (2 - Td) 9/28/2021 *Topic was postponed. The date shown is not the original due date. Allergies as of 7/3/2018  Review Complete On: 7/3/2018 By: Junior Cornelius MD  
 No Known Allergies Current Immunizations  Reviewed on 7/3/2018 Name Date Influenza High Dose Vaccine PF 10/3/2017, 11/4/2016 12:35 PM, 10/8/2015  2:30 PM  
 Pneumococcal Conjugate (PCV-13) 4/12/2016 11:48 AM  
 Pneumococcal Polysaccharide (PPSV-23) 5/11/2017  3:35 PM  
 Tdap 9/28/2011 Reviewed by REBECCA FloresD on 7/3/2018 at 10:30 AM  
You Were Diagnosed With   
  
 Codes Comments Medicare annual wellness visit, subsequent    -  Primary ICD-10-CM: Z00.00 ICD-9-CM: V70.0 Elevated serum creatinine     ICD-10-CM: R79.89 ICD-9-CM: 790.99 Hypertension, unspecified type     ICD-10-CM: I10 
ICD-9-CM: 401.9 Venous insufficiency     ICD-10-CM: I87.2 ICD-9-CM: 459.81 Vitals BP Pulse Temp Resp Height(growth percentile) Weight(growth percentile) 131/61 (BP 1 Location: Left arm, BP Patient Position: Sitting) 96 96.4 °F (35.8 °C) (Oral) 16 5' 2\" (1.575 m) 216 lb 3.2 oz (98.1 kg) SpO2 BMI OB Status Smoking Status 100% 39.54 kg/m2 Postmenopausal Former Smoker Vitals History BMI and BSA Data Body Mass Index Body Surface Area  
 39.54 kg/m 2 2.07 m 2 Preferred Pharmacy Pharmacy Name Phone [address]  [phone] Your Updated Medication List  
  
   
This list is accurate as of 7/3/18  1:44 PM.  Always use your most recent med list. amLODIPine 10 mg tablet Commonly known as:  Gurdeep Hinkles TAKE 1 TABLET BY MOUTH DAILY. INDICATIONS: HYPERTENSION  
  
 ammonium lactate 12 % lotion Commonly known as:  LAC-HYDRIN Apply daily to legs after bathing CENTRUM SILVER PO Take  by mouth.  
  
 clobetasol 0.05 % ointment Commonly known as:  Beola Kareem Apply  to affected area two (2) times a day. hydrocortisone 2.5 % topical cream  
Commonly known as:  HYTONE Apply  to affected area two (2) times a day. use thin layer on neck and eyelids  
  
 raNITIdine 150 mg tablet Commonly known as:  ZANTAC TAKE 1 TABLET BY MOUTH DAILY. INDICATIONS: GASTROESOPHAGEAL REFLUX  
  
 simvastatin 20 mg tablet Commonly known as:  ZOCOR  
TAKE 1 TABLET BY MOUTH NIGHTLY. INDICATIONS: HYPERLIPIDEMIA  
  
 triamcinolone acetonide 0.1 % topical cream  
Commonly known as:  KENALOG Apply  to affected area two (2) times a day. valsartan-hydroCHLOROthiazide 160-12.5 mg per tablet Commonly known as:  DIOVAN-HCT  
TAKE 1 TABLET BY MOUTH DAILY. varicella-zoster vacine live 19,400 unit/0.65 mL Susr injection Generic drug:  varicella zoster vaccine live To-Do List   
 07/03/2018 Lab:  METABOLIC PANEL, COMPREHENSIVE Patient Instructions Medicare Wellness Visit, Female The best way to live healthy is to have a lifestyle where you eat a well-balanced diet, exercise regularly, limit alcohol use, and quit all forms of tobacco/nicotine, if applicable. Regular preventive services are another way to keep healthy. Preventive services (vaccines, screening tests, monitoring & exams) can help personalize your care plan, which helps you manage your own care. Screening tests can find health problems at the earliest stages, when they are easiest to treat. [facility] follows the current, evidence-based guidelines published by the [facility] States Rasheed Scott ([facility]) when recommending preventive services for our patients. Because we follow these guidelines, sometimes recommendations change over time as research supports it. (For example, mammograms used to be recommended annually. Even though Medicare will still pay for an annual mammogram, the newer guidelines recommend a mammogram every two years for women of average risk.) Of course, you and your provider may decide to screen more often for some diseases, based on your risk and co-morbidities (chronic disease you are already diagnosed with). Preventive services for you include: - Medicare offers their members a free annual wellness visit, which is time for you and your primary care provider to discuss and plan for your preventive service needs. Take advantage of this benefit every year! 
 
-All people over age 72 should receive the recommended pneumonia vaccines. Current USPSTF guidelines recommend a series of two vaccines for the best pneumonia protection.  
 
-All adults should have a yearly flu vaccine and a tetanus vaccine every 10 years. All adults age 61 years should receive a shingles vaccine once in their lifetime.   
 
-A bone mass density test is recommended when a woman turns 65 to screen for osteoporosis. This test is only recommended once as a screening. Some providers will use this same test as a disease monitoring tool if you already have osteoporosis.  
 
-All adults age 38-68 years who are overweight should have a diabetes screening test once every three years.  
 
-Other screening tests & preventive services for persons with diabetes include: an eye exam to screen for diabetic retinopathy, a kidney function test, a foot exam, and stricter control over your cholesterol.  
 
-Cardiovascular screening for adults with routine risk involves an electrocardiogram (ECG) at intervals determined by the provider.  
 
-Colorectal cancer screenings should be done for adults age 54-65 years with normal risk. There are a number of acceptable methods of screening for this type of cancer. Each test has its own benefits and drawbacks. Discuss with your provider what is most appropriate for you during your annual wellness visit. The different tests include: colonoscopy (considered the best screening method), a fecal occult blood test, a fecal DNA test, and sigmoidoscopy. -Breast cancer screenings are recommended every other year for women of normal risk age 54-69 years.  
 
-Cervical cancer screenings for women over age 72 are only recommended with certain risk factors.  
 
-All adults born between [facility] should be screened once for Hepatitis C. Here is a list of your current Health Maintenance items (your personalized list of preventive services) with a due date: 
Health Maintenance Due Topic Date Due  
 Annual Well Visit  05/12/2018 Introducing [facility]! [facility] introduces [employer] patient portal. Now you can access parts of your medical record, email your doctor's office, and request medication refills online. 1. In your internet browser, go to [email or web address] 2. Click on the First Time User? Click Here link in the Sign In box. You will see the New Member Sign Up page. 3. Enter your [employer] Access Code exactly as it appears below. You will not need to use this code after youve completed the sign-up process. If you do not sign up before the expiration date, you must request a new code. · [employer] Access Code: [email or web address] Expires: 7/16/2018  9:23 AM 
 
4.  Enter the last four digits of your Social Security Number (xxxx) and Date of Birth (mm/dd/yyyy) as indicated and click Submit. You will be taken to the next sign-up page. 5. Create a [employer] ID. This will be your [employer] login ID and cannot be changed, so think of one that is secure and easy to remember. 6. Create a [employer] password. You can change your password at any time. 7. Enter your Password Reset Question and Answer. This can be used at a later time if you forget your password. 8. Enter your e-mail address. You will receive e-mail notification when new information is available in [address] 9. Click Sign Up. You can now view and download portions of your medical record. 10. Click the Download Summary menu link to download a portable copy of your medical information. If you have questions, please visit the Frequently Asked Questions section of the [employer] website. Remember, [employer] is NOT to be used for urgent needs. For medical emergencies, dial 911. Now available from your iPhone and Android! Please provide this summary of care documentation to your next provider. Your primary care clinician is listed as [facility]. If you have any questions after today's visit, please call [phone].

## 2018-07-03 NOTE — PROGRESS NOTES
Susan Jamil presents today for   Chief Complaint   Patient presents with    Annual Wellness Visit       Cyn Perry preferred language for health care discussion is english/other. Is someone accompanying this pt? no    Is the patient using any DME equipment during [address]? Yes, cane    Depression Screening:  PHQ over the last two weeks 7/3/2018 6/4/2018 5/11/2017 3/6/2017 8/15/2016 4/12/2016 4/13/2015   Little interest or pleasure in doing things Not at all Not at all Not at all Not at all Not at all Not at all Not at all   Feeling down, depressed or hopeless Not at all Not at all Not at all Not at all Not at all Not at all Not at all   Total Score PHQ 2 0 0 0 0 0 0 0       Learning Assessment:  Learning Assessment 4/13/2015   PRIMARY LEARNER Patient   HIGHEST LEVEL OF EDUCATION - PRIMARY LEARNER  DID NOT GRADUATE HIGH SCHOOL   BARRIERS PRIMARY LEARNER NONE   CO-LEARNER CAREGIVER No   PRIMARY LANGUAGE ENGLISH   LEARNER PREFERENCE PRIMARY DEMONSTRATION     LISTENING   ANSWERED BY patient   RELATIONSHIP SELF       Abuse Screening:  Abuse Screening Questionnaire 7/3/2018 5/11/2017 4/13/2015   Do you ever feel afraid of your partner? N N N   Are you in a relationship with someone who physically or mentally threatens you? N N N   Is it safe for you to go home? Gloria Bell       Fall Risk  Fall Risk Assessment, last 12 mths 7/3/2018 6/4/2018 10/3/2017 3/6/2017 8/15/2016 4/12/2016 5/15/2015   Able to walk? Yes Yes Yes Yes Yes Yes Yes   Fall in past 12 months? No No No No No No Yes   Fall with injury? - - - - - - No       Health Maintenance reviewed and discussed per provider. Yes    Cyn Perry is due for   Health Maintenance Due   Topic Date Due    MEDICARE YEARLY EXAM  05/12/2018     Please order/place referral if appropriate. Advance Directive:  1. Do you have an advance directive in place? Patient Reply: no    2. If not, would you like material regarding how to put one in place?  Patient Reply: yes, given    Coordination of Care:  1. Have you been to the ER, urgent care clinic since your last visit? Hospitalized since your last visit? no    2. Have you seen or consulted any other health care providers outside of the [facility] since your last visit? Include any pap smears or colon screening.  no

## 2018-07-04 LAB
ALBUMIN SERPL-MCNC: 4.1 G/DL (ref 3.6–4.8)
ALBUMIN/GLOB SERPL: 1.1 {RATIO} (ref 1.2–2.2)
ALP SERPL-CCNC: 81 IU/L (ref 39–117)
ALT SERPL-CCNC: 9 IU/L (ref 0–32)
AST SERPL-CCNC: 11 IU/L (ref 0–40)
BILIRUB SERPL-MCNC: 0.2 MG/DL (ref 0–1.2)
BUN SERPL-MCNC: 33 MG/DL (ref 8–27)
BUN/CREAT SERPL: 17 (ref 12–28)
CALCIUM SERPL-MCNC: 9.3 MG/DL (ref 8.7–10.3)
CHLORIDE SERPL-SCNC: 103 MMOL/L (ref 96–106)
CO2 SERPL-SCNC: 21 MMOL/L (ref 20–29)
CREAT SERPL-MCNC: 1.98 MG/DL (ref 0.57–1)
GLOBULIN SER CALC-MCNC: 3.7 G/DL (ref 1.5–4.5)
GLUCOSE SERPL-MCNC: 98 MG/DL (ref 65–99)
POTASSIUM SERPL-SCNC: 4.6 MMOL/L (ref 3.5–5.2)
PROT SERPL-MCNC: 7.8 G/DL (ref 6–8.5)
SODIUM SERPL-SCNC: 140 MMOL/L (ref 134–144)

## 2018-08-07 ENCOUNTER — TELEPHONE (OUTPATIENT)
Dept: INTERNAL MEDICINE CLINIC | Age: 68
End: 2018-08-07

## 2018-08-07 NOTE — TELEPHONE ENCOUNTER
Yoly Sandhu from [facility]  is calling requesting alternative medication for valsartan.

## 2018-08-08 DIAGNOSIS — Z76.0 MEDICATION REFILL: ICD-10-CM

## 2018-08-08 RX ORDER — OLMESARTAN MEDOXOMIL AND HYDROCHLOROTHIAZIDE 20/12.5 20; 12.5 MG/1; MG/1
1 TABLET ORAL DAILY
Qty: 90 TAB | Refills: 1 | Status: SHIPPED | OUTPATIENT
Start: 2018-08-08 | End: 2019-01-31 | Stop reason: SDUPTHER

## 2018-08-08 RX ORDER — SIMVASTATIN 20 MG/1
20 TABLET, FILM COATED ORAL
Qty: 30 TAB | Refills: 5 | Status: SHIPPED | OUTPATIENT
Start: 2018-08-08 | End: 2019-01-31 | Stop reason: SDUPTHER

## 2018-08-08 RX ORDER — RANITIDINE 150 MG/1
150 TABLET, FILM COATED ORAL DAILY
Qty: 30 TAB | Refills: 5 | Status: SHIPPED | OUTPATIENT
Start: 2018-08-08 | End: 2019-01-31 | Stop reason: SDUPTHER

## 2018-08-08 NOTE — TELEPHONE ENCOUNTER
Requested Prescriptions     Pending Prescriptions Disp Refills    raNITIdine (ZANTAC) 150 mg tablet 30 Tab 5    simvastatin (ZOCOR) 20 mg tablet 30 Tab 5

## 2018-09-10 DIAGNOSIS — I10 ESSENTIAL HYPERTENSION: Chronic | ICD-10-CM

## 2018-09-10 RX ORDER — AMLODIPINE BESYLATE 10 MG/1
TABLET ORAL
Qty: 30 TAB | Refills: 5 | Status: SHIPPED | OUTPATIENT
Start: 2018-09-10 | End: 2019-03-11 | Stop reason: SDUPTHER

## 2018-11-08 DIAGNOSIS — I87.2 VENOUS STASIS DERMATITIS OF BOTH LOWER EXTREMITIES: ICD-10-CM

## 2018-11-08 DIAGNOSIS — L03.119 CELLULITIS OF LOWER EXTREMITY, UNSPECIFIED LATERALITY: ICD-10-CM

## 2018-11-08 RX ORDER — CLOBETASOL PROPIONATE 0.5 MG/G
OINTMENT TOPICAL
Qty: 30 G | Refills: 5 | Status: SHIPPED | OUTPATIENT
Start: 2018-11-08 | End: 2019-08-16 | Stop reason: SDUPTHER

## 2018-12-06 ENCOUNTER — HOSPITAL ENCOUNTER (OUTPATIENT)
Dept: MAMMOGRAPHY | Age: 68
Discharge: HOME | End: 2018-12-06
Attending: INTERNAL MEDICINE
Payer: MEDICARE

## 2018-12-06 DIAGNOSIS — Z12.31 VISIT FOR SCREENING MAMMOGRAM: ICD-10-CM

## 2018-12-06 PROCEDURE — 77063 BREAST TOMOSYNTHESIS BI: CPT

## 2019-01-31 DIAGNOSIS — Z76.0 MEDICATION REFILL: ICD-10-CM

## 2019-01-31 RX ORDER — SIMVASTATIN 20 MG/1
TABLET, FILM COATED ORAL
Qty: 30 TAB | Refills: 5 | Status: SHIPPED | OUTPATIENT
Start: 2019-01-31 | End: 2019-08-01 | Stop reason: SDUPTHER

## 2019-01-31 RX ORDER — OLMESARTAN MEDOXOMIL AND HYDROCHLOROTHIAZIDE 20/12.5 20; 12.5 MG/1; MG/1
TABLET ORAL
Qty: 90 TAB | Refills: 1 | Status: SHIPPED | OUTPATIENT
Start: 2019-01-31 | End: 2019-04-22

## 2019-01-31 RX ORDER — RANITIDINE 150 MG/1
TABLET, FILM COATED ORAL
Qty: 30 TAB | Refills: 5 | Status: SHIPPED | OUTPATIENT
Start: 2019-01-31 | End: 2019-08-02 | Stop reason: SDUPTHER

## 2019-03-11 DIAGNOSIS — I10 ESSENTIAL HYPERTENSION: Chronic | ICD-10-CM

## 2019-03-11 RX ORDER — AMLODIPINE BESYLATE 10 MG/1
TABLET ORAL
Qty: 30 TAB | Refills: 5 | Status: SHIPPED | OUTPATIENT
Start: 2019-03-11 | End: 2019-04-16

## 2019-03-28 ENCOUNTER — OFFICE VISIT (OUTPATIENT)
Dept: INTERNAL MEDICINE CLINIC | Age: 69
End: 2019-03-28

## 2019-03-28 VITALS
RESPIRATION RATE: 18 BRPM | TEMPERATURE: 97.1 F | SYSTOLIC BLOOD PRESSURE: 139 MMHG | DIASTOLIC BLOOD PRESSURE: 61 MMHG | HEART RATE: 94 BPM | WEIGHT: 218 LBS | HEIGHT: 62 IN | OXYGEN SATURATION: 99 % | BODY MASS INDEX: 40.12 KG/M2

## 2019-03-28 DIAGNOSIS — R55 SYNCOPE, UNSPECIFIED SYNCOPE TYPE: Primary | ICD-10-CM

## 2019-03-28 DIAGNOSIS — Z78.9 IMPAIRED MOBILITY AND ACTIVITIES OF DAILY LIVING: ICD-10-CM

## 2019-03-28 DIAGNOSIS — Z74.09 IMPAIRED MOBILITY AND ACTIVITIES OF DAILY LIVING: ICD-10-CM

## 2019-03-28 DIAGNOSIS — I10 ESSENTIAL HYPERTENSION: Chronic | ICD-10-CM

## 2019-03-28 DIAGNOSIS — N18.30 CHRONIC RENAL IMPAIRMENT, STAGE 3 (MODERATE) (HCC): Chronic | ICD-10-CM

## 2019-03-28 NOTE — PROGRESS NOTES
Rm:16    Chief Complaint   Patient presents with    Dizziness    Headache     Depression Screening:  3 most recent [facility] Screens 3/28/2019 7/3/2018 6/4/2018 5/11/2017 3/6/2017 8/15/2016 4/12/2016   Little interest or pleasure in doing things Not at all Not at all Not at all Not at all Not at all Not at all Not at all   Feeling down, depressed, irritable, or hopeless Not at all Not at all Not at all Not at all Not at all Not at all Not at all   Total Score PHQ 2 0 0 0 0 0 0 0       Learning Assessment:  Learning Assessment 4/13/2015   PRIMARY LEARNER Patient   HIGHEST LEVEL OF EDUCATION - PRIMARY LEARNER  DID NOT GRADUATE HIGH SCHOOL   BARRIERS PRIMARY LEARNER NONE   CO-LEARNER CAREGIVER No   PRIMARY LANGUAGE ENGLISH   LEARNER PREFERENCE PRIMARY DEMONSTRATION     LISTENING   ANSWERED BY patient   RELATIONSHIP SELF       Abuse Screening:  Abuse Screening Questionnaire 7/3/2018 5/11/2017 4/13/2015   Do you ever feel afraid of your partner? N N N   Are you in a relationship with someone who physically or mentally threatens you? N N N   Is it safe for you to go home? [facility] [place] reviewed and discussed per provider: yes     Coordination of Care:    1. Have you been to the ER, urgent care clinic since your last visit? Hospitalized since your last visit? no    2. Have you seen or consulted any other health care providers outside of the [address] [place] [address] since your last visit? Include any pap smears or colon screening.  no

## 2019-03-28 NOTE — PROGRESS NOTES
HISTORY OF PRESENT ILLNESS  Cyn Perry is a 76 y.o. female. 77 yo female here for evaluation of recent syncope which occurred 1 week ago. States she felt fine that morning. Took Norvasc, antacid pill on empty stomach which was unusual for her. Some nausea after this. Started feeling 'bad' after eating greasy food which she typically does not eat. In PM, vomited while in chair where she had been sitting for meal then passed out. Woke up on floor but not sure how long that had been. Did feel a little better when waking . No vomiting since but has not felt 'right' since that time. No dizziness. Last passed out three years ago after similar episode. Does note she had been having loose stools as well. BP is stable   Lives alone in [place] [facility]. Friend concerned with safety. Pt has inquired about obtaining life alert      Review of Systems   Constitutional: Negative for chills, fever and weight loss. HENT: Negative for congestion and ear pain. Eyes: Negative for blurred vision and pain. Respiratory: Negative for cough and shortness of breath. Cardiovascular: Negative for chest pain, palpitations and leg swelling. Gastrointestinal: Positive for nausea and vomiting (resolved). Negative for abdominal pain, blood in stool and melena. Genitourinary: Negative for frequency and urgency. Musculoskeletal: Positive for joint pain. Negative for myalgias. Skin: Negative for itching and rash. Neurological: Negative for tingling and headaches. Dizziness: resolved. Psychiatric/Behavioral: Negative for depression. The patient is not nervous/anxious.       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 INDICATIONS: HYPERTENSION 30 Tab 5    olmesartan-hydroCHLOROthiazide (BENICAR HCT) 20-12.5 mg per tablet TAKE 1 TABLET BY MOUTH DAILY. 90 Tab 1    raNITIdine (ZANTAC) 150 mg tablet TAKE 1 TABLET BY MOUTH DAILY. 30 Tab 5    simvastatin (ZOCOR) 20 mg tablet TAKE 1 TABLET BY MOUTH NIGHTLY. 30 Tab 5    clobetasol (TEMOVATE) 0.05 % ointment APPLY TO AFFECTED AREA TWICE DAILY 30 g 5    folic acid/multivit-min/lutein (CENTRUM SILVER PO) Take  by mouth.  hydrocortisone (HYTONE) 2.5 % topical cream Apply  to affected area two (2) times a day. use thin layer on neck and eyelids 30 g 1    ammonium lactate (LAC-HYDRIN) 12 % lotion Apply daily to legs after bathing 400 mL 5    triamcinolone acetonide (KENALOG) 0.1 % topical cream Apply  to affected area two (2) times a day. 15 g 0    VARICELLA-ZOSTER VIRUS INFECTION PROPHYLAXIS 19,400 unit/0.65 mL susr injection        No current facility-administered medications on file prior to visit. Physical Exam   Constitutional: She appears well-developed and well-nourished. No distress. /61 (BP 1 Location: Right arm, BP Patient Position: Sitting)   Pulse 94   Temp 97.1 °F (36.2 °C) (Oral)   Resp 18   Ht 5' 2\" (1.575 m)   Wt 218 lb (98.9 kg)   SpO2 99%   BMI 39.87 kg/m²      Eyes: EOM are normal. Right eye exhibits no discharge. Left eye exhibits no discharge. No scleral icterus. Neck: Neck supple. Cardiovascular: Normal rate, regular rhythm and normal heart sounds. Exam reveals no gallop and no friction rub. No murmur heard. Pulmonary/Chest: Effort normal and breath sounds normal. No respiratory distress. She has no wheezes. She has no rales. Abdominal: Soft. She exhibits no distension. There is no tenderness. There is no rebound and no guarding.    Musculoskeletal: She exhibits no edema or tenderness. Lymphadenopathy:     She has no cervical adenopathy. Neurological: She is alert. She exhibits normal muscle tone. Skin: Skin is warm and dry. Psychiatric: She has a normal mood and affect. Lab Results   Component Value Date/Time    Sodium 140 07/03/2018 01:54 PM    Potassium 4.6 07/03/2018 01:54 PM    Chloride 103 07/03/2018 01:54 PM    CO2 21 07/03/2018 01:54 PM    Anion gap 9 04/12/2016 12:06 PM    Glucose 98 07/03/2018 01:54 PM    BUN 33 (H) 07/03/2018 01:54 PM    Creatinine 1.98 (H) 07/03/2018 01:54 PM    BUN/Creatinine ratio 17 07/03/2018 01:54 PM    GFR est AA 29 (L) 07/03/2018 01:54 PM    GFR est non-AA 25 (L) 07/03/2018 01:54 PM    Calcium 9.3 07/03/2018 01:54 PM    Bilirubin, total 0.2 07/03/2018 01:54 PM    AST (SGOT) 11 07/03/2018 01:54 PM    Alk. phosphatase 81 07/03/2018 01:54 PM    Protein, total 7.8 07/03/2018 01:54 PM    Albumin 4.1 07/03/2018 01:54 PM    Globulin 4.1 (H) 04/12/2016 12:06 PM    A-G Ratio 1.1 (L) 07/03/2018 01:54 PM    ALT (SGPT) 9 07/03/2018 01:54 PM     Lab Results   Component Value Date/Time    WBC 5.5 09/05/2014 10:51 AM    HGB 10.9 (L) 09/05/2014 10:51 AM    HCT 35.2 09/05/2014 10:51 AM    PLATELET 806 (H) 54/10/5912 10:51 AM    MCV 88 09/05/2014 10:51 AM     ASSESSMENT and PLAN    ICD-10-CM ICD-9-CM    1. Syncope, unspecified syncope type R55 780.2 REFERRAL TO HOME HEALTH   2. Impaired mobility and activities of daily living Z74.09 799.89 [facility]   3. Essential hypertension I10 401.9 CBC WITH AUTOMATED DIFF      TSH 3RD GENERATION      METABOLIC PANEL, COMPREHENSIVE      MICROALBUMIN, UR, RAND W/ MICROALB/CREAT RATIO   4. Chronic renal impairment, stage 3 (moderate) (HCC) N18.3 585. 3      Unclear etiology, ? Vasovagal vs dehydration. Will repeat labs today. BP stable, will continue with current medication for now. [facility] for home safety assessment.  Discussed with pt and friend that there may be resources available for IADL assistance, but often these may have out of pocket costs.

## 2019-03-29 ENCOUNTER — HOME HEALTH ADMISSION (OUTPATIENT)
Dept: HOME HEALTH SERVICES | Facility: HOME HEALTH | Age: 69
End: 2019-03-29
Payer: MEDICARE

## 2019-03-29 ENCOUNTER — HOME CARE VISIT (OUTPATIENT)
Dept: SCHEDULING | Facility: HOME HEALTH | Age: 69
End: 2019-03-29
Payer: MEDICARE

## 2019-03-29 ENCOUNTER — TELEPHONE (OUTPATIENT)
Dept: INTERNAL MEDICINE CLINIC | Age: 69
End: 2019-03-29

## 2019-03-29 ENCOUNTER — HOME CARE VISIT (OUTPATIENT)
Dept: HOME HEALTH SERVICES | Facility: HOME HEALTH | Age: 69
End: 2019-03-29

## 2019-03-29 LAB
ALBUMIN SERPL-MCNC: 4.4 G/DL (ref 3.6–4.8)
ALBUMIN/CREAT UR: <12.7 MG/G CREAT (ref 0–30)
ALBUMIN/GLOB SERPL: 1.1 {RATIO} (ref 1.2–2.2)
ALP SERPL-CCNC: 91 IU/L (ref 39–117)
ALT SERPL-CCNC: 9 IU/L (ref 0–32)
AST SERPL-CCNC: 13 IU/L (ref 0–40)
BASOPHILS # BLD AUTO: 0.1 X10E3/UL (ref 0–0.2)
BASOPHILS NFR BLD AUTO: 1 %
BILIRUB SERPL-MCNC: 0.2 MG/DL (ref 0–1.2)
BUN SERPL-MCNC: 33 MG/DL (ref 8–27)
BUN/CREAT SERPL: 18 (ref 12–28)
CALCIUM SERPL-MCNC: 9.6 MG/DL (ref 8.7–10.3)
CHLORIDE SERPL-SCNC: 103 MMOL/L (ref 96–106)
CO2 SERPL-SCNC: 20 MMOL/L (ref 20–29)
CREAT SERPL-MCNC: 1.8 MG/DL (ref 0.57–1)
CREAT UR-MCNC: 23.6 MG/DL
EOSINOPHIL # BLD AUTO: 0.4 X10E3/UL (ref 0–0.4)
EOSINOPHIL NFR BLD AUTO: 5 %
ERYTHROCYTE [DISTWIDTH] IN BLOOD BY AUTOMATED COUNT: 17.7 % (ref 12.3–15.4)
GLOBULIN SER CALC-MCNC: 3.9 G/DL (ref 1.5–4.5)
GLUCOSE SERPL-MCNC: 97 MG/DL (ref 65–99)
HCT VFR BLD AUTO: 30.9 % (ref 34–46.6)
HGB BLD-MCNC: 9.3 G/DL (ref 11.1–15.9)
IMM GRANULOCYTES # BLD AUTO: 0 X10E3/UL (ref 0–0.1)
IMM GRANULOCYTES NFR BLD AUTO: 0 %
LYMPHOCYTES # BLD AUTO: 1.5 X10E3/UL (ref 0.7–3.1)
LYMPHOCYTES NFR BLD AUTO: 19 %
MCH RBC QN AUTO: 25.2 PG (ref 26.6–33)
MCHC RBC AUTO-ENTMCNC: 30.1 G/DL (ref 31.5–35.7)
MCV RBC AUTO: 84 FL (ref 79–97)
MICROALBUMIN UR-MCNC: <3 UG/ML
MONOCYTES # BLD AUTO: 0.5 X10E3/UL (ref 0.1–0.9)
MONOCYTES NFR BLD AUTO: 6 %
NEUTROPHILS # BLD AUTO: 5.6 X10E3/UL (ref 1.4–7)
NEUTROPHILS NFR BLD AUTO: 69 %
PLATELET # BLD AUTO: 436 X10E3/UL (ref 150–379)
POTASSIUM SERPL-SCNC: 4.5 MMOL/L (ref 3.5–5.2)
PROT SERPL-MCNC: 8.3 G/DL (ref 6–8.5)
RBC # BLD AUTO: 3.69 X10E6/UL (ref 3.77–5.28)
SODIUM SERPL-SCNC: 137 MMOL/L (ref 134–144)
TSH SERPL DL<=0.005 MIU/L-ACNC: 0.8 UIU/ML (ref 0.45–4.5)
WBC # BLD AUTO: 8 X10E3/UL (ref 3.4–10.8)

## 2019-03-29 PROCEDURE — G0151 HHCP-SERV OF PT,EA 15 MIN: HCPCS

## 2019-03-29 PROCEDURE — 400013 HH SOC

## 2019-03-29 PROCEDURE — 3331090001 HH PPS REVENUE CREDIT

## 2019-03-29 PROCEDURE — 3331090002 HH PPS REVENUE DEBIT

## 2019-03-29 NOTE — TELEPHONE ENCOUNTER
Please ask that she hold her blood pressure medication and schedule f/u for next week to reassess this.

## 2019-03-29 NOTE — TELEPHONE ENCOUNTER
Incoming call from [facility] home health physical therapist.  2 patient identifiers confirmed. She states she was with patient doing home safety assessment. She states that pt is doing well mobility wise but she will recommend pt for OT and social work for help with resources. The reason she is calling is because she is concerned about patient blood pressure. She states patient BP was 105/56 on left arm and 98/58 on patient right forearm. She states patient had only taken Benicar and was planing to prepare to take Norvasc. She has instructed patient to wait to hear from nurse before taking dose of Norvasc. Please advise.

## 2019-03-30 PROCEDURE — 3331090002 HH PPS REVENUE DEBIT

## 2019-03-30 PROCEDURE — 3331090001 HH PPS REVENUE CREDIT

## 2019-03-31 PROCEDURE — 3331090001 HH PPS REVENUE CREDIT

## 2019-03-31 PROCEDURE — 3331090002 HH PPS REVENUE DEBIT

## 2019-04-01 PROCEDURE — 3331090002 HH PPS REVENUE DEBIT

## 2019-04-01 PROCEDURE — 3331090001 HH PPS REVENUE CREDIT

## 2019-04-01 NOTE — TELEPHONE ENCOUNTER
Patient contacted at home number. 2 patient identifiers confirmed. Patient informed of below. Patient states she has not been taking her Amlodipine 5 mg since Thursday 03/28/2019. Patient states she last was able to check BP on 03/29/2019 evening it was 165/67, they rechecked later it was 120/55. Patient has been taking Benicar HCT once daily as ordered. Patient has an appointment with Dr Supriya Uriostegui on 04/09/2019. Please advise if patient should continue to hold Amlodipine or both BP medications. Patient denies any dizziness or headache at this time.

## 2019-04-01 NOTE — TELEPHONE ENCOUNTER
Continue the benicar and hold [facility]. F/u with Dr. Paulette Snowden on 4/9/19.

## 2019-04-02 ENCOUNTER — HOME CARE VISIT (OUTPATIENT)
Dept: HOME HEALTH SERVICES | Facility: HOME HEALTH | Age: 69
End: 2019-04-02
Payer: MEDICARE

## 2019-04-02 VITALS
SYSTOLIC BLOOD PRESSURE: 98 MMHG | DIASTOLIC BLOOD PRESSURE: 58 MMHG | OXYGEN SATURATION: 96 % | TEMPERATURE: 98.5 F | HEART RATE: 85 BPM

## 2019-04-02 PROCEDURE — 3331090002 HH PPS REVENUE DEBIT

## 2019-04-02 PROCEDURE — 3331090001 HH PPS REVENUE CREDIT

## 2019-04-02 NOTE — PROGRESS NOTES
Please let her know she has had some worsening of her anemia. Has she noticed any bleeding? Her renal function remains reduced but is stable.  She should f/u with Dr. Devora Gatica as scheduled

## 2019-04-03 PROCEDURE — 3331090002 HH PPS REVENUE DEBIT

## 2019-04-03 PROCEDURE — 3331090001 HH PPS REVENUE CREDIT

## 2019-04-04 ENCOUNTER — HOME CARE VISIT (OUTPATIENT)
Dept: SCHEDULING | Facility: HOME HEALTH | Age: 69
End: 2019-04-04
Payer: MEDICARE

## 2019-04-04 PROCEDURE — G0155 HHCP-SVS OF CSW,EA 15 MIN: HCPCS

## 2019-04-04 PROCEDURE — 3331090002 HH PPS REVENUE DEBIT

## 2019-04-04 PROCEDURE — 3331090001 HH PPS REVENUE CREDIT

## 2019-04-05 ENCOUNTER — HOME CARE VISIT (OUTPATIENT)
Dept: SCHEDULING | Facility: HOME HEALTH | Age: 69
End: 2019-04-05
Payer: MEDICARE

## 2019-04-05 PROCEDURE — 3331090002 HH PPS REVENUE DEBIT

## 2019-04-05 PROCEDURE — G0152 HHCP-SERV OF OT,EA 15 MIN: HCPCS

## 2019-04-05 PROCEDURE — 3331090001 HH PPS REVENUE CREDIT

## 2019-04-06 PROCEDURE — 3331090001 HH PPS REVENUE CREDIT

## 2019-04-06 PROCEDURE — 3331090002 HH PPS REVENUE DEBIT

## 2019-04-07 VITALS
TEMPERATURE: 98.2 F | SYSTOLIC BLOOD PRESSURE: 120 MMHG | OXYGEN SATURATION: 98 % | DIASTOLIC BLOOD PRESSURE: 64 MMHG | HEART RATE: 89 BPM

## 2019-04-07 PROCEDURE — 3331090001 HH PPS REVENUE CREDIT

## 2019-04-07 PROCEDURE — 3331090002 HH PPS REVENUE DEBIT

## 2019-04-08 ENCOUNTER — HOME CARE VISIT (OUTPATIENT)
Dept: HOME HEALTH SERVICES | Facility: HOME HEALTH | Age: 69
End: 2019-04-08
Payer: MEDICARE

## 2019-04-08 PROCEDURE — 3331090001 HH PPS REVENUE CREDIT

## 2019-04-08 PROCEDURE — 3331090002 HH PPS REVENUE DEBIT

## 2019-04-09 PROCEDURE — 3331090002 HH PPS REVENUE DEBIT

## 2019-04-09 PROCEDURE — 3331090001 HH PPS REVENUE CREDIT

## 2019-04-10 PROCEDURE — 3331090002 HH PPS REVENUE DEBIT

## 2019-04-10 PROCEDURE — 3331090001 HH PPS REVENUE CREDIT

## 2019-04-11 PROCEDURE — 3331090001 HH PPS REVENUE CREDIT

## 2019-04-11 PROCEDURE — 3331090002 HH PPS REVENUE DEBIT

## 2019-04-12 ENCOUNTER — HOME CARE VISIT (OUTPATIENT)
Dept: SCHEDULING | Facility: HOME HEALTH | Age: 69
End: 2019-04-12
Payer: MEDICARE

## 2019-04-12 VITALS
SYSTOLIC BLOOD PRESSURE: 128 MMHG | HEART RATE: 104 BPM | TEMPERATURE: 98.2 F | DIASTOLIC BLOOD PRESSURE: 81 MMHG | OXYGEN SATURATION: 100 %

## 2019-04-12 PROCEDURE — 3331090001 HH PPS REVENUE CREDIT

## 2019-04-12 PROCEDURE — G0158 HHC OT ASSISTANT EA 15: HCPCS

## 2019-04-12 PROCEDURE — 3331090002 HH PPS REVENUE DEBIT

## 2019-04-13 PROCEDURE — 3331090002 HH PPS REVENUE DEBIT

## 2019-04-13 PROCEDURE — 3331090001 HH PPS REVENUE CREDIT

## 2019-04-14 PROCEDURE — 3331090002 HH PPS REVENUE DEBIT

## 2019-04-14 PROCEDURE — 3331090001 HH PPS REVENUE CREDIT

## 2019-04-15 ENCOUNTER — HOME CARE VISIT (OUTPATIENT)
Dept: SCHEDULING | Facility: HOME HEALTH | Age: 69
End: 2019-04-15
Payer: MEDICARE

## 2019-04-15 VITALS
DIASTOLIC BLOOD PRESSURE: 90 MMHG | OXYGEN SATURATION: 99 % | SYSTOLIC BLOOD PRESSURE: 144 MMHG | TEMPERATURE: 98.1 F | HEART RATE: 82 BPM

## 2019-04-15 PROCEDURE — 3331090001 HH PPS REVENUE CREDIT

## 2019-04-15 PROCEDURE — 3331090002 HH PPS REVENUE DEBIT

## 2019-04-15 PROCEDURE — G0152 HHCP-SERV OF OT,EA 15 MIN: HCPCS

## 2019-04-16 ENCOUNTER — OFFICE VISIT (OUTPATIENT)
Dept: INTERNAL MEDICINE CLINIC | Age: 69
End: 2019-04-16

## 2019-04-16 VITALS
HEART RATE: 98 BPM | WEIGHT: 219 LBS | RESPIRATION RATE: 16 BRPM | HEIGHT: 62 IN | SYSTOLIC BLOOD PRESSURE: 139 MMHG | BODY MASS INDEX: 40.3 KG/M2 | DIASTOLIC BLOOD PRESSURE: 58 MMHG | TEMPERATURE: 96.3 F | OXYGEN SATURATION: 100 %

## 2019-04-16 DIAGNOSIS — I10 ESSENTIAL HYPERTENSION: ICD-10-CM

## 2019-04-16 DIAGNOSIS — R55 VASOVAGAL SYNCOPE: Primary | ICD-10-CM

## 2019-04-16 DIAGNOSIS — D64.9 ANEMIA, UNSPECIFIED TYPE: ICD-10-CM

## 2019-04-16 DIAGNOSIS — D50.9 IRON DEFICIENCY ANEMIA, UNSPECIFIED IRON DEFICIENCY ANEMIA TYPE: ICD-10-CM

## 2019-04-16 NOTE — PROGRESS NOTES
ROOM # 4    Cyn D [place] presents today for   Chief Complaint   Patient presents with    Other       Cyn MENDOZA [place] preferred language for health care discussion is english/other. Is someone accompanying this pt? no    Is the patient using any DME equipment during [facility]? cane    Depression Screening:  3 most recent [facility] Screens 3/28/2019 7/3/2018 6/4/2018 5/11/2017 3/6/2017 8/15/2016 4/12/2016   Little interest or pleasure in doing things Not at all Not at all Not at all Not at all Not at all Not at all Not at all   Feeling down, depressed, irritable, or hopeless Not at all Not at all Not at all Not at all Not at all Not at all Not at all   Total Score PHQ 2 0 0 0 0 0 0 0       Learning Assessment:  Learning Assessment 4/13/2015   PRIMARY LEARNER Patient   HIGHEST LEVEL OF EDUCATION - PRIMARY LEARNER  DID NOT GRADUATE HIGH SCHOOL   BARRIERS PRIMARY LEARNER NONE   CO-LEARNER CAREGIVER No   PRIMARY LANGUAGE ENGLISH   LEARNER PREFERENCE PRIMARY DEMONSTRATION     LISTENING   ANSWERED BY patient   RELATIONSHIP SELF       Abuse Screening:  Abuse Screening Questionnaire 7/3/2018 5/11/2017 4/13/2015   Do you ever feel afraid of your partner? N N N   Are you in a relationship with someone who physically or mentally threatens you? N N N   Is it safe for you to go home? Tere Magana       Fall Risk  Fall Risk Assessment, last 12 mths 3/28/2019 3/28/2019 7/3/2018 6/4/2018 10/3/2017 3/6/2017 8/15/2016   Able to walk? - Yes Yes Yes Yes Yes Yes   Fall in past 12 months? Yes Yes No No No No No   Fall with injury? No No - - - - -   Number of falls in past 12 months 1 1 - - - - -   Fall Risk Score 1 1 - - - - -       Health Maintenance reviewed and discussed per provider. Yes    Inell Chhaya is due for   Health Maintenance Due   Topic Date Due    Shingrix Vaccine Age 49> (1 of 2) 05/04/2000    GLAUCOMA SCREENING Q2Y  05/04/2015     Please order/place referral if appropriate. Advance Directive:  1.  Do you have an advance directive in place? Patient Reply: no    2. If not, would you like material regarding how to put one in place? Patient Reply: no    Coordination of Care:  1. Have you been to the ER, urgent care clinic since your last visit? Hospitalized since your last visit? no    2. Have you seen or consulted any other health care providers outside of the [address] [place] [address] since your last visit? Include any pap smears or colon screening.  no

## 2019-04-16 NOTE — PROGRESS NOTES
HISTORY OF PRESENT ILLNESS  Cny Perry is a 76 y.o. female. Visit Vitals  /58 (BP 1 Location: Left arm, BP Patient Position: Sitting)   Pulse 98   Temp 96.3 °F (35.7 °C) (Oral)   Resp 16   Ht 5' 2\" (1.575 m)   Wt 219 lb (99.3 kg)   SpO2 100%   BMI 40.06 kg/m²       Pt here to f/u on a syncopal episode. She saw Dr. Oscar Quevedo 2 and half weeks ago. Probably food related. Pt thinks so also    Reports today feeling really well. SXs had resolved the next day after seeing Dr. Jazmyn Mcghee referral was done. She says someone was out yesterday    Other   The history is provided by the patient (see comments). This is a new problem. ROS    Physical Exam   Constitutional: She is oriented to person, place, and time. She appears well-developed and well-nourished. No distress. Cardiovascular: Normal rate and regular rhythm. Pulmonary/Chest: Effort normal and breath sounds normal.   Musculoskeletal: She exhibits no edema. Neurological: She is alert and oriented to person, place, and time. Skin: Skin is warm and dry. She is not diaphoretic. Psychiatric: She has a normal mood and affect. Nursing note and vitals reviewed. ASSESSMENT and PLAN    ICD-10-CM ICD-9-CM    1. Vasovagal syncope R55 780.2    2. Essential hypertension I10 401.9    3. Anemia, unspecified type D64.9 285.9 HGB & HCT      IRON PROFILE      VITAMIN B12 & FOLATE       BP controlled off norvasc. Will stay off for now    [facility] and no additional sxs noted since last visit    Anemia. May be due to renal disease. Will check iron levels. Had colonoscopy in 2010.  If iron deficient will refer back to GI.    F/u 3 months for MWV, HTN

## 2019-04-17 LAB
FOLATE SERPL-MCNC: 9.8 NG/ML
HCT VFR BLD AUTO: 27.6 % (ref 34–46.6)
HGB BLD-MCNC: 8.3 G/DL (ref 11.1–15.9)
IRON SATN MFR SERPL: 9 % (ref 15–55)
IRON SERPL-MCNC: 30 UG/DL (ref 27–139)
TIBC SERPL-MCNC: 333 UG/DL (ref 250–450)
UIBC SERPL-MCNC: 303 UG/DL (ref 118–369)
VIT B12 SERPL-MCNC: 634 PG/ML (ref 232–1245)

## 2019-04-19 RX ORDER — LANOLIN ALCOHOL/MO/W.PET/CERES
325 CREAM (GRAM) TOPICAL
Qty: 30 TAB | Refills: 5 | Status: SHIPPED | OUTPATIENT
Start: 2019-04-19 | End: 2019-10-22 | Stop reason: SDUPTHER

## 2019-04-19 NOTE — PROGRESS NOTES
Please advise her that her iron levels are low. I am sending over an iron tablet. Should it constipate her, I advise adding a fiber like Metamucil or Citrucel to her diet. This iron deficiency worries me some. I know they didn't find anything several years ago.  She may wish to go back to GI for an evaluation (it would have to be a different doctor given she has [employer])

## 2019-04-22 ENCOUNTER — TELEPHONE (OUTPATIENT)
Dept: INTERNAL MEDICINE CLINIC | Age: 69
End: 2019-04-22

## 2019-04-22 RX ORDER — TELMISARTAN AND HYDROCHLORTHIAZIDE 40; 12.5 MG/1; MG/1
1 TABLET ORAL DAILY
Qty: 30 TAB | Refills: 5 | Status: SHIPPED | OUTPATIENT
Start: 2019-04-22 | End: 2019-04-24 | Stop reason: SDUPTHER

## 2019-04-22 NOTE — PROGRESS NOTES
Pt contacted at home number. 2 pt identifiers confirmed. Pt informed of below. She would like referral to GI. Pt verbalized understanding. No other questions at this time.

## 2019-04-22 NOTE — TELEPHONE ENCOUNTER
Pharmacist with [facility] is calling in to request a med change. States the Benicar HCT has been on backorder for quite some time. Requesting to change the medication to Micardis HCT.     [facility] - [phone]

## 2019-04-23 ENCOUNTER — TELEPHONE (OUTPATIENT)
Dept: INTERNAL MEDICINE CLINIC | Age: 69
End: 2019-04-23

## 2019-04-23 NOTE — TELEPHONE ENCOUNTER
Pharmacist with [facility] is calling to get a 80 day RX. States the cost for the patient is less with a 90 day Rx.

## 2019-04-24 RX ORDER — TELMISARTAN AND HYDROCHLORTHIAZIDE 40; 12.5 MG/1; MG/1
1 TABLET ORAL DAILY
Qty: 90 TAB | Refills: 4 | Status: SHIPPED | OUTPATIENT
Start: 2019-04-24 | End: 2019-12-31 | Stop reason: SDUPTHER

## 2019-04-24 NOTE — TELEPHONE ENCOUNTER
Contacted pharmacy. Two patient Identifiers confirmed. Advised  per Dr Lsea Corbin notes. Per pharmacist the would like to request a 90 day supply of the micardis be sent to pharmacy or verbal order. Dr Lesa Corbin please advise.

## 2019-07-15 ENCOUNTER — ANESTHESIA EVENT (OUTPATIENT)
Dept: ENDOSCOPY | Age: 69
End: 2019-07-15
Payer: MEDICARE

## 2019-07-16 ENCOUNTER — HOSPITAL ENCOUNTER (OUTPATIENT)
Age: 69
Setting detail: OUTPATIENT SURGERY
Discharge: HOME | End: 2019-07-16
Attending: INTERNAL MEDICINE | Admitting: INTERNAL MEDICINE
Payer: MEDICARE

## 2019-07-16 ENCOUNTER — ANESTHESIA (OUTPATIENT)
Dept: ENDOSCOPY | Age: 69
End: 2019-07-16
Payer: MEDICARE

## 2019-07-16 VITALS
OXYGEN SATURATION: 100 % | DIASTOLIC BLOOD PRESSURE: 65 MMHG | HEIGHT: 64 IN | WEIGHT: 216.5 LBS | HEART RATE: 87 BPM | TEMPERATURE: 98 F | BODY MASS INDEX: 36.96 KG/M2 | SYSTOLIC BLOOD PRESSURE: 157 MMHG | RESPIRATION RATE: 18 BRPM

## 2019-07-16 PROCEDURE — 74011250636 HC RX REV CODE- 250/636: Performed by: NURSE ANESTHETIST, CERTIFIED REGISTERED

## 2019-07-16 PROCEDURE — 77030031670 HC DEV INFL ENDOTEK BIG60 MRTM -B: Performed by: INTERNAL MEDICINE

## 2019-07-16 PROCEDURE — 76040000007: Performed by: INTERNAL MEDICINE

## 2019-07-16 PROCEDURE — 77030021593 HC FCPS BIOP ENDOSC BSC -A: Performed by: INTERNAL MEDICINE

## 2019-07-16 PROCEDURE — 88305 TISSUE EXAM BY PATHOLOGIST: CPT

## 2019-07-16 PROCEDURE — 76060000032 HC ANESTHESIA 0.5 TO 1 HR: Performed by: INTERNAL MEDICINE

## 2019-07-16 PROCEDURE — 74011250636 HC RX REV CODE- 250/636

## 2019-07-16 RX ORDER — SODIUM CHLORIDE 0.9 % (FLUSH) 0.9 %
5-40 SYRINGE (ML) INJECTION EVERY 8 HOURS
Status: DISCONTINUED | OUTPATIENT
Start: 2019-07-16 | End: 2019-07-16 | Stop reason: HOSPADM

## 2019-07-16 RX ORDER — SODIUM CHLORIDE 0.9 % (FLUSH) 0.9 %
5-40 SYRINGE (ML) INJECTION AS NEEDED
Status: DISCONTINUED | OUTPATIENT
Start: 2019-07-16 | End: 2019-07-16 | Stop reason: HOSPADM

## 2019-07-16 RX ORDER — DEXTROMETHORPHAN/PSEUDOEPHED 2.5-7.5/.8
1.2 DROPS ORAL
Status: DISCONTINUED | OUTPATIENT
Start: 2019-07-16 | End: 2019-07-16 | Stop reason: HOSPADM

## 2019-07-16 RX ORDER — SODIUM CHLORIDE, SODIUM LACTATE, POTASSIUM CHLORIDE, CALCIUM CHLORIDE 600; 310; 30; 20 MG/100ML; MG/100ML; MG/100ML; MG/100ML
100 INJECTION, SOLUTION INTRAVENOUS CONTINUOUS
Status: DISCONTINUED | OUTPATIENT
Start: 2019-07-16 | End: 2019-07-16 | Stop reason: HOSPADM

## 2019-07-16 RX ORDER — PROPOFOL 10 MG/ML
INJECTION, EMULSION INTRAVENOUS AS NEEDED
Status: DISCONTINUED | OUTPATIENT
Start: 2019-07-16 | End: 2019-07-16 | Stop reason: HOSPADM

## 2019-07-16 RX ADMIN — PROPOFOL 20 MG: 10 INJECTION, EMULSION INTRAVENOUS at 11:33

## 2019-07-16 RX ADMIN — PROPOFOL 20 MG: 10 INJECTION, EMULSION INTRAVENOUS at 11:14

## 2019-07-16 RX ADMIN — PROPOFOL 20 MG: 10 INJECTION, EMULSION INTRAVENOUS at 11:16

## 2019-07-16 RX ADMIN — PROPOFOL 20 MG: 10 INJECTION, EMULSION INTRAVENOUS at 11:21

## 2019-07-16 RX ADMIN — PROPOFOL 20 MG: 10 INJECTION, EMULSION INTRAVENOUS at 11:25

## 2019-07-16 RX ADMIN — SODIUM CHLORIDE, SODIUM LACTATE, POTASSIUM CHLORIDE, AND CALCIUM CHLORIDE 100 ML/HR: 600; 310; 30; 20 INJECTION, SOLUTION INTRAVENOUS at 10:24

## 2019-07-16 RX ADMIN — PROPOFOL 30 MG: 10 INJECTION, EMULSION INTRAVENOUS at 11:28

## 2019-07-16 RX ADMIN — PROPOFOL 20 MG: 10 INJECTION, EMULSION INTRAVENOUS at 11:27

## 2019-07-16 RX ADMIN — PROPOFOL 20 MG: 10 INJECTION, EMULSION INTRAVENOUS at 11:30

## 2019-07-16 RX ADMIN — PROPOFOL 40 MG: 10 INJECTION, EMULSION INTRAVENOUS at 11:18

## 2019-07-16 RX ADMIN — PROPOFOL 30 MG: 10 INJECTION, EMULSION INTRAVENOUS at 11:23

## 2019-07-16 RX ADMIN — PROPOFOL 80 MG: 10 INJECTION, EMULSION INTRAVENOUS at 11:12

## 2019-07-16 RX ADMIN — PROPOFOL 20 MG: 10 INJECTION, EMULSION INTRAVENOUS at 11:13

## 2019-07-16 NOTE — ANESTHESIA PREPROCEDURE EVALUATION
Relevant Problems   No relevant active problems       Anesthetic History   No history of anesthetic complications            Review of Systems / Medical History  Patient summary reviewed and pertinent labs reviewed    Pulmonary  Within defined limits                 Neuro/Psych   Within defined limits           Cardiovascular    Hypertension: well controlled          PAD and hyperlipidemia      Comments: Grade 1 diastolic dysfunction    GI/Hepatic/Renal     GERD: well controlled    Renal disease: CRI       Endo/Other        Morbid obesity     Other Findings            Physical Exam    Airway  Mallampati: III  TM Distance: 4 - 6 cm  Neck ROM: normal range of motion   Mouth opening: Normal     Cardiovascular    Rhythm: regular  Rate: normal         Dental    Dentition: Poor dentition     Pulmonary  Breath sounds clear to auscultation               Abdominal  GI exam deferred       Other Findings            Anesthetic Plan    ASA: 2  Anesthesia type: MAC and general - backup          Induction: Intravenous  Anesthetic plan and risks discussed with: Patient

## 2019-07-16 NOTE — PROCEDURES
Endoscopy Procedure Note    Patient: Joon Abrams MRN: [id number]  SSN: [id number]    YOB: 1950  Age: 71 y.o. Sex: female      Date/Time:  7/16/2019 11:46 AM       Esophagogastroduodenoscopy (EGD) Procedure Note    Procedure: Esophagogastroduodenoscopy with biopsy    IMPRESSION:   1. -2 cm hiatal hernia. 2. -Duodenum biopsied to r/o celiac  3. -Otherwise normal upper endoscopy. 4.- A few flecks of coffee ground material in stomach    RECOMMENDATIONS:  1. -Await pathology. , -No NSAIDS, -Oral iron replacement  2. -Follow up with primary care physician, -Follow up with me.  3. -Outpt pill camera    Indication: Iron deficiency anemia  :  Sonia Hilliard MD  Referring Provider:   Jabier Silveira MD    Assistants: Endoscopy Technician-1: Milana Jara CNA  Endoscopy RN-1: Niesha Doshi RN, None    History: The history and physical exam were reviewed and updated. Endoscope: [id number]  Extent of Exam: third portion of the duodenum  ASA: ASA 2 - Patient with mild systemic disease with no functional limitations  Anethesia/Sedation:  MAC anesthesia    Description of the procedure: The procedure was discussed with the patient including risks, benefits, alternatives including risks of iv sedation, bleeding, perforation and aspiration. A safety timeout was performed. The patient was placed in the left lateral decubitus position. A bite block was placed. The patient was given incremental doses of intravenous sedation until moderate sedation was achieved. The patients vital signs were monitored at all times including heart rate/rhythm, blood pressure and oxygen saturation. The endoscope was then passed under direct visualization to the third portion of the duodenum. The endoscope was then slowly withdrawn while visualizing the mucosa. In the stomach a retroflexion was performed and gastric fundus and cardia visualized. The endoscope was then slowly withdrawn.   The patient was then transferred to recovery in stable condition. Findings:    Esophagus: The esophageal mucosa was normal with no ulceration, mass or stricture. There was no evidence of Cardenas's esophagus or reflux esophagitis. Stomach: The gastric mucosa was normal with no ulceration, mass, stricture. Duodenum: The duodenum mucosa was normal with no ulceration, mass, stricture and no evidence of villous atrophy. Therapies:  biopsy of duodenal second portion    Specimens:   ID Type Source Tests Collected by Time Destination   1 : bx r/o celiac  Preservative Duodenum  Maylin Jane MD 7/16/2019 1124 Pathology               Complications:   None; patient tolerated the procedure well.     EBL:Minimal  Prosthetic devices, grafts, tissues or devices implanted: None    Discharge disposition:  Home in the company of  when able to ambulate    Neri Cervantes MD, [facility] [place], [facility]  July 16, 2019  11:46 AM

## 2019-07-16 NOTE — DISCHARGE INSTRUCTIONS
For the next 12 hours you should not:   1. drive   2. drink alcohol   3. operate any machinery   4. engage in activities that require mental sharpness or manual dexterity such as     cooking   5. take any drugs other than those prescribed by a physician   6. make any legal or financial decisions  Call your doctor's office immediately, if:   1. increased and continuing rectal bleeding   2. fever   3. increased abdominal pain    Take it easy today and resume normal activity tomorrow. Resume previous diet. DISCHARGE SUMMARY from Nurse    PATIENT INSTRUCTIONS:    After general anesthesia or intravenous sedation, for 24 hours or while taking prescription Narcotics:  · Limit your activities  · Do not drive and operate hazardous machinery  · Do not make important personal or business decisions  · Do  not drink alcoholic beverages  · If you have not urinated within 8 hours after discharge, please contact your surgeon on call. Report the following to your surgeon:  · Excessive pain, swelling, redness or odor of or around the surgical area  · Temperature over 100.5  · Nausea and vomiting lasting longer than 4 hours or if unable to take medications  · Any signs of decreased circulation or nerve impairment to extremity: change in color, persistent  numbness, tingling, coldness or increase pain  · Any questions    What to do at Home:  Recommended activity: Activity as tolerated and no driving for today,     These are general instructions for a healthy lifestyle:    No smoking/ No tobacco products/ Avoid exposure to second hand smoke  Surgeon General's Warning:  Quitting smoking now greatly reduces serious risk to your health.     Obesity, smoking, and sedentary lifestyle greatly increases your risk for illness    A healthy diet, regular physical exercise & weight monitoring are important for maintaining a healthy lifestyle    You may be retaining fluid if you have a history of heart failure or if you experience any of the following symptoms:  Weight gain of 3 pounds or more overnight or 5 pounds in a week, increased swelling in our hands or feet or shortness of breath while lying flat in bed. Please call your doctor as soon as you notice any of these symptoms; do not wait until your next office visit. The discharge information has been reviewed with the patient. The patient verbalized understanding. Discharge medications reviewed with the patient and appropriate educational materials and side effects teaching were provided. Patient armband removed and given to patient to take home.   Patient was informed of the privacy risks if armband lost or stolen

## 2019-07-16 NOTE — H&P
History and Physical    Carol Junior      1950  [id number]      [id number]    Pre-Procedure Diagnosis:  iron def anemia  d50.9  Patient being seen by Mario Stinson MD for: EGD and colonoscopy  Chief Complaint: No chief complaint on file. 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 Delfino Newsome HX HEENT      dental problems       Allergies:  No Known Allergies    Previous reactions to sedation/analgesia? NO    Review of current medications, supplement, herbals and nutraceuticals complete:  YES  Current Facility-Administered Medications   Medication Dose Route Frequency Provider Last Rate Last Dose    lactated Ringers infusion  100 mL/hr IntraVENous CONTINUOUS Vitor Gandhi CRNA              Pertinent labs reviewed? YES    History of substance abuse?   NO  Family History   Problem Relation Age of Onset    Diabetes Mother     Diabetes Father      Social History     Socioeconomic History    Marital status: SINGLE     Spouse name: Not on file    Number of children: Not on file    Years of education: Not on file    Highest education level: Not on file   Occupational History    Occupation:    Social Needs    Financial resource strain: Not on file    Food insecurity:     Worry: Not on file     Inability: Not on file    Transportation needs:     Medical: Not on file     Non-medical: Not on file   Tobacco Use    Smoking status: Former Smoker    Smokeless tobacco: Never Used    Tobacco comment: quit 1965   Substance and Sexual Activity    Alcohol use: No    Drug use: No    Sexual activity: Never   Lifestyle    Physical activity:     Days per week: Not on file     Minutes per session: Not on file    Stress: Not on file   Relationships    Social connections:     Talks on phone: Not on file     Gets together: Not on file     Attends Gnosticism service: Not on file     Active member of club or organization: Not on file     Attends meetings of clubs or organizations: Not on file     Relationship status: Not on file    Intimate partner violence:     Fear of current or ex partner: Not on file     Emotionally abused: Not on file     Physically abused: Not on file     Forced sexual activity: Not on file   Other Topics Concern    Not on file   Social History Narrative    Not on file       Review of Systems:     Cardiac Status:  WNL  Mental Status:  WNL   Pulmonary Status:  WNL  NPO:  >4    Physical exam deferred, normal female appearance    Assessment/Impression: Iron def anemia    Plan of treatment: EGD/Colonoscopy        Ramos Young MD  7/16/2019  10:11 AM

## 2019-07-16 NOTE — PERIOP NOTES
Phase 2 Recovery Summary  Patient arrived to Phase 2 at 1145  Report received from [address]:    07/16/19 0939 07/16/19 1142   BP: 174/53 103/52   Pulse: 90 87   Resp: 18    Temp: 98 °F (36.7 °C)    SpO2: 100% 100%   Weight: 98.2 kg (216 lb 8 oz)    Height: 5' 4\" (1.626 m)        oriented to time, place, person and situation    Lines and Drains  Peripheral Intravenous Line:   Peripheral IV 07/16/19 Right Wrist (Active)   Site Assessment Clean, dry, & intact 7/16/2019 11:42 AM   Phlebitis Assessment 0 7/16/2019 11:42 AM   Infiltration Assessment 0 7/16/2019 11:42 AM   Dressing Status Clean, dry, & intact 7/16/2019 11:42 AM   Dressing Type Tape;Transparent 7/16/2019 11:42 AM   Hub Color/Line Status Blue 7/16/2019 11:42 AM   Action Taken Dressing reinforced 7/16/2019 10:26 AM   Alcohol Cap Used Yes 7/16/2019 11:42 AM     pt presents to phase 2 from endo. VSS. Pt denies pain, N/V or dizziness. Pt ambulated from bed to chair with minimal assistance. Dr. Allen Blue came to speak to pt and sister. Pt tolerated fluid well. IV removed. Pt allowed to get dressed. Discharge reviewed with pt. Sister signed for discharge. Pt brought down via wheelchair.           Patient discharged to home with sister  at [address]

## 2019-07-16 NOTE — PROCEDURES
Colonoscopy Report    Patient: Beto Tee MRN: [id number]  SSN: [id number]    YOB: 1950  Age: 71 y.o. Sex: female      Date of Procedure: 7/16/2019    IMPRESSION:  1. hypertrophied papillae  2. normal colonic mucosa throughout      RECOMMENDATIONS:  1. Follow up with primary care physician. 2. Oral iron replacement  3. Consider outpt pill camera of small bowel    Indication:  Iron deficiency anemia  Procedure Performed: Colonoscopy into cecum  Pre op Diagnosis:iron def anemia  d50.9  Post op Diagnosis: EGD- hiatal hernia, scattered coffee ground in stomach no evidence of bleeding / COLON- hemorrhoids     Endoscopist: Nelson Whaley MD  Assistant: Endoscopy Technician-1: Reno Marcum CNA  Endoscopy RN-1: Jackie Ko RN, None     ASA: ASA 2 - Patient with mild systemic disease with no functional limitations  Mallampati Score: II (soft palate, uvula, fauces visible)  Anesthesia: MAC anesthesia  Endoscope: [id number]  Extent of Examination:cecum, which was identified by the ileocecal valve and appendiceal orifice  Quality of Preparation:     [x]  Excellent   []  Very Good   [] Fair but adequate   [] Fair, inadequate   []  Poor      Technique: The procedure was discussed with the patient including risks, benefits, alternatives including risks of IV sedation, bleeding, perforation and missed polyp. A safety timeout was performed. The patient was given incremental doses of Versed and Demerol to achieve moderate conscious sedation. The patients vital signs were monitored at all times including heart rate, rhythm, blood pressure and oxygen saturation. The patient was placed in left lateral position. When adequate sedation was achieved a perianal inspection and a digital rectal exam were performed. Video colonoscope was introduced into the rectum and advanced under direct vision up to the cecum, which was identified by the ileocecal valve and appendiceal orifice.  The cecum was identified by IC valve, appendiceal orifice and crows foot. With adequate insufflation and maneuvering of the withdrawing scope, the colonic mucosa was visualized carefully. Retroflexion was performed in the rectum and the distal rectum visualized. The patient tolerated the procedure very well and was transferred to recovery area. Findings:  1. Hypertrophied anal papillae  2. Otherwise normal colonoscopy to the cecum.    3.     EBL: None    Specimen:   ID Type Source Tests Collected by Time Destination   1 : bx r/o celiac  Preservative Duodenum  Lori Ugalde MD 7/16/2019 1124 Pathology     Prosthetic devices, grafts, tissues or devices implanted: None  COMPLICATIONS: None    Aurelio Del Real MD, Benita Caldwell [facility]  July 16, 2019  11:42 AM

## 2019-07-16 NOTE — ANESTHESIA POSTPROCEDURE EVALUATION
Procedure(s):  ESOPHAGOGASTRODUODENOSCOPY (EGD)  COLONOSCOPY.    MAC, general - backup    Anesthesia Post Evaluation      Multimodal analgesia: multimodal analgesia not used between 6 hours prior to anesthesia start to PACU discharge  Patient location during evaluation: PACU  Patient participation: complete - patient participated  Level of consciousness: awake  Pain management: satisfactory to patient  Airway patency: patent  Anesthetic complications: no  Cardiovascular status: acceptable  Respiratory status: acceptable  Hydration status: acceptable  Post anesthesia nausea and vomiting:  none      Vitals Value Taken Time   /52 7/16/2019 11:42 AM   Temp     Pulse 89 7/16/2019 11:42 AM   Resp     SpO2 100 % 7/16/2019 11:42 AM

## 2019-07-16 NOTE — PERIOP NOTES
Pre-Op Summary    Pt arrived via car with family/friend and is oriented to time, place, person and situation. Patient with steady gait with cane assistive devices. Visit Vitals  /53 (BP 1 Location: Left arm, BP Patient Position: At rest)   Pulse 90   Temp 98 °F (36.7 °C)   Resp 18   Ht 5' 4\" (1.626 m)   Wt 98.2 kg (216 lb 8 oz)   SpO2 100%   Breastfeeding? No   BMI 37.16 kg/m²       Peripheral IV located on Right hand . Patients belongings are located with sister. Patient's point of contact is Ronda Sloan and their contact number is: [phone]. They will be in the waiting room. They are able to receive medication information. They will be their ride home.

## 2019-08-01 DIAGNOSIS — Z76.0 MEDICATION REFILL: ICD-10-CM

## 2019-08-01 RX ORDER — SIMVASTATIN 20 MG/1
TABLET, FILM COATED ORAL
Qty: 30 TAB | Refills: 5 | Status: SHIPPED | OUTPATIENT
Start: 2019-08-01 | End: 2020-02-03

## 2019-08-02 RX ORDER — RANITIDINE 150 MG/1
TABLET, FILM COATED ORAL
Qty: 30 TAB | Refills: 5 | Status: SHIPPED | OUTPATIENT
Start: 2019-08-02 | End: 2019-12-04

## 2019-08-16 ENCOUNTER — OFFICE VISIT (OUTPATIENT)
Dept: INTERNAL MEDICINE CLINIC | Age: 69
End: 2019-08-16

## 2019-08-16 VITALS
HEIGHT: 64 IN | DIASTOLIC BLOOD PRESSURE: 56 MMHG | BODY MASS INDEX: 37.9 KG/M2 | SYSTOLIC BLOOD PRESSURE: 140 MMHG | HEART RATE: 101 BPM | OXYGEN SATURATION: 100 % | TEMPERATURE: 95 F | WEIGHT: 222 LBS | RESPIRATION RATE: 20 BRPM

## 2019-08-16 DIAGNOSIS — I10 ESSENTIAL HYPERTENSION: ICD-10-CM

## 2019-08-16 DIAGNOSIS — I87.2 STASIS DERMATITIS OF BOTH LEGS: ICD-10-CM

## 2019-08-16 DIAGNOSIS — D64.9 ANEMIA, UNSPECIFIED TYPE: ICD-10-CM

## 2019-08-16 DIAGNOSIS — I87.2 VENOUS STASIS DERMATITIS OF BOTH LOWER EXTREMITIES: ICD-10-CM

## 2019-08-16 DIAGNOSIS — E78.00 HYPERCHOLESTEROLEMIA: Chronic | ICD-10-CM

## 2019-08-16 DIAGNOSIS — Z00.00 MEDICARE ANNUAL WELLNESS VISIT, SUBSEQUENT: Primary | ICD-10-CM

## 2019-08-16 DIAGNOSIS — E66.01 OBESITY, MORBID (HCC): ICD-10-CM

## 2019-08-16 DIAGNOSIS — N18.30 CKD (CHRONIC KIDNEY DISEASE) STAGE 3, GFR 30-59 ML/MIN (HCC): ICD-10-CM

## 2019-08-16 RX ORDER — CLOBETASOL PROPIONATE 0.5 MG/G
OINTMENT TOPICAL
Qty: 30 G | Refills: 5 | Status: SHIPPED | OUTPATIENT
Start: 2019-08-16 | End: 2020-07-22

## 2019-08-16 RX ORDER — AMMONIUM LACTATE 12 G/100G
LOTION TOPICAL
Qty: 400 ML | Refills: 5 | Status: SHIPPED | OUTPATIENT
Start: 2019-08-16 | End: 2022-10-18

## 2019-08-16 NOTE — PROGRESS NOTES
HISTORY OF PRESENT ILLNESS  Cyn Perry is a 71 y.o. female. Visit Vitals  /56 (BP 1 Location: Left arm, BP Patient Position: Sitting)   Pulse (!) 101   Temp 95 °F (35 °C) (Oral)   Resp 20   Ht 5' 4\" (1.626 m)   Wt 222 lb (100.7 kg)   SpO2 100%   BMI 38.11 kg/m²       Here also to f/u on anemia. Saw GI and no significant cause found for her iron deficiency  Reports no further syncopal spells. Hypertension    The history is provided by the patient. This is a chronic problem. The current episode started more than 1 week ago. The problem has not changed since onset. Pertinent negatives include no chest pain, no palpitations, no headaches, no dizziness and no shortness of breath. There are no associated agents to hypertension. Risk factors include postmenopause, a sedentary lifestyle, obesity, dyslipidemia, hypertension and stress. Other   The history is provided by the patient (see comments). This is a new problem. Pertinent negatives include no chest pain, no headaches and no shortness of breath. Review of Systems   Constitutional: Negative. Respiratory: Negative for shortness of breath. Cardiovascular: Positive for leg swelling. Negative for chest pain and palpitations. Neurological: Negative for dizziness and headaches. Physical Exam   Constitutional: She is oriented to person, place, and time. She appears well-developed and well-nourished. No distress. Cardiovascular: Normal rate and regular rhythm. Pulmonary/Chest: Effort normal and breath sounds normal. No respiratory distress. She has no wheezes. Musculoskeletal: She exhibits edema (with significant venous stasis changes). Neurological: She is alert and oriented to person, place, and time. Skin: Skin is warm and dry. She is not diaphoretic. Psychiatric: She has a normal mood and affect. Nursing note and vitals reviewed. ASSESSMENT and PLAN    ICD-10-CM ICD-9-CM           2.  Essential hypertension I10 401.9 METABOLIC PANEL, BASIC      CBC WITH AUTOMATED DIFF      LIPID PANEL   3. Anemia, unspecified type W63.7 177.4 METABOLIC PANEL, BASIC      IRON PROFILE   4. Hypercholesterolemia E78.00 272.0 LIPID PANEL   5. Venous stasis dermatitis of both lower extremities I87.2 454.1 clobetasol (TEMOVATE) 0.05 % ointment      ammonium lactate (LAC-HYDRIN) 12 % lotion   6. Stasis dermatitis of both legs I87.2 454.1 clobetasol (TEMOVATE) 0.05 % ointment      ammonium lactate (LAC-HYDRIN) 12 % lotion   7. CKD (chronic kidney disease) stage 3, GFR 30-59 ml/min ([facility]) D34.8 449.9 METABOLIC PANEL, BASIC       BP controlled    Anemia--negative w/u by GI. Was iron deficient. Will recheck    CRI--due for recheck    Discussed BMI/weight, lifestyle, diet and exercise. Discussed effect on blood pressure, blood sugar, and joints especially  Focus on limiting white carbs, portion control, and moving more.     Refills as noted    F/u 3-4 months

## 2019-08-16 NOTE — PROGRESS NOTES
This is the Subsequent Medicare Annual Wellness Exam, performed 12 months or more after the Initial AWV or the last Subsequent AWV    I have reviewed the patient's medical history in detail and updated the computerized patient record. 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 Nima Garcia HX ENDOSCOPY  07/16/2019    EGD.  HX HEENT      dental problems     Current Outpatient Medications   Medication Sig Dispense Refill    raNITIdine (ZANTAC) 150 mg tablet TAKE 1 TABLET BY MOUTH DAILY. 30 Tab 5    simvastatin (ZOCOR) 20 mg tablet TAKE 1 TABLET BY MOUTH NIGHTLY. 30 Tab 5    telmisartan-hydroCHLOROthiazide (MICARDIS HCT) 40-12.5 mg per tablet Take 1 Tab by mouth daily. 90 Tab 4    ferrous sulfate 325 mg (65 mg iron) tablet Take 1 Tab by mouth Daily (before breakfast). Indications: anemia from inadequate iron 30 Tab 5    clobetasol (TEMOVATE) 0.05 % ointment APPLY TO AFFECTED AREA TWICE DAILY (Patient taking differently: apply to LEGS twice daily as needed for 2 weeks then stop for a week and repeat as needed) 30 g 5    folic acid/multivit-min/lutein (CENTRUM SILVER PO) Take  by mouth.  hydrocortisone (HYTONE) 2.5 % topical cream Apply  to affected area two (2) times a day.  use thin layer on neck and eyelids 30 g 1    ammonium lactate (LAC-HYDRIN) 12 % lotion Apply daily to legs after bathing 400 mL 5    triamcinolone acetonide (KENALOG) 0.1 % topical cream Apply  to affected area two (2) times a day. 15 g 0    VARICELLA-ZOSTER VIRUS INFECTION PROPHYLAXIS 19,400 unit/0.65 mL susr injection        No Known Allergies  Family History   Problem Relation Age of Onset    Diabetes Mother     Diabetes Father      Social History     Tobacco Use    Smoking status: Former Smoker    Smokeless tobacco: Never Used    Tobacco comment: quit 1965   Substance Use Topics    Alcohol use: No     Patient Active Problem List   Diagnosis Code    Hypercholesterolemia E78.00    Essential hypertension I10    CRI (chronic renal insufficiency) N18.9    Obesity, morbid (HCC) E66.01       Depression Risk Factor Screening:     3 most recent PHQ Screens 3/28/2019   Little interest or pleasure in doing things Not at all   Feeling down, depressed, irritable, or hopeless Not at all   Total Score PHQ 2 0     Alcohol Risk Factor Screening: You do not drink alcohol or very rarely. Functional Ability and Level of Safety:   Hearing Loss  Hearing is good. Activities of Daily Living  The home contains: grab bars and shower chair  Patient needs help with:  transportation and walking    Fall Risk  Fall Risk Assessment, last 12 mths 3/28/2019   Able to walk? -   Fall in past 12 months? Yes   Fall with injury?  No   Number of falls in past 12 months 1   Fall Risk Score 1       Abuse Screen  Patient is not abused    Cognitive Screening   Evaluation of Cognitive Function:  Has your family/caregiver stated any concerns about your memory: no  Normal, Mini Cog test    Patient Care Team   Patient Care Team:  Rajwinder Aceves MD as PCP - General (Internal Medicine)  Isiah Victor MD as Consulting Provider (Ophthalmology)  Lawrence Sierra MD as Consulting Provider (Gastroenterology)    Assessment/Plan   Education and counseling provided:  Are appropriate based on today's review and evaluation    Diagnoses and all orders for this visit: 1.  Medicare annual wellness visit, subsequent        Health Maintenance Due   Topic Date Due    GLAUCOMA SCREENING Q2Y  05/04/2015    MEDICARE YEARLY EXAM  07/04/2019    Influenza Age 5 to Adult  08/01/2019

## 2019-08-16 NOTE — PATIENT INSTRUCTIONS
Medicare Wellness Visit, Female     The best way to live healthy is to have a lifestyle where you eat a well-balanced diet, exercise regularly, limit alcohol use, and quit all forms of tobacco/nicotine, if applicable. Regular preventive services are another way to keep healthy. Preventive services (vaccines, screening tests, monitoring & exams) can help personalize your care plan, which helps you manage your own care. Screening tests can find health problems at the earliest stages, when they are easiest to treat. [facility] follows the current, evidence-based guidelines published by the [facility] ([facility]) when recommending preventive services for our patients. Because we follow these guidelines, sometimes recommendations change over time as research supports it. (For example, mammograms used to be recommended annually. Even though Medicare will still pay for an annual mammogram, the newer guidelines recommend a mammogram every two years for women of average risk.)  Of course, you and your doctor may decide to screen more often for some diseases, based on your risk and your health status. Preventive services for you include:  - Medicare offers their members a free annual wellness visit, which is time for you and your primary care provider to discuss and plan for your preventive service needs. Take advantage of this benefit every year!  -All adults over the age of 72 should receive the recommended pneumonia vaccines. Current USPSTF guidelines recommend a series of two vaccines for the best pneumonia protection.   -All adults should have a flu vaccine yearly and a tetanus vaccine every 10 years. All adults age 61 and older should receive a shingles vaccine once in their lifetime.    -A bone mass density test is recommended when a woman turns 65 to screen for osteoporosis. This test is only recommended one time, as a screening.  Some providers will use this same test as a disease monitoring tool if you already have osteoporosis. -All adults age 38-68 who are overweight should have a diabetes screening test once every three years.   -Other screening tests and preventive services for persons with diabetes include: an eye exam to screen for diabetic retinopathy, a kidney function test, a foot exam, and stricter control over your cholesterol.   -Cardiovascular screening for adults with routine risk involves an electrocardiogram (ECG) at intervals determined by your doctor.   -Colorectal cancer screenings should be done for adults age 54-65 with no increased risk factors for colorectal cancer. There are a number of acceptable methods of screening for this type of cancer. Each test has its own benefits and drawbacks. Discuss with your doctor what is most appropriate for you during your annual wellness visit. The different tests include: colonoscopy (considered the best screening method), a fecal occult blood test, a fecal DNA test, and sigmoidoscopy. -Breast cancer screenings are recommended every other year for women of normal risk, age 54-69.  -Cervical cancer screenings for women over age 72 are only recommended with certain risk factors.   -All adults born between [facility] should be screened once for Hepatitis C.      Here is a list of your current Health Maintenance items (your personalized list of preventive services) with a due date:  Health Maintenance Due   Topic Date Due    Glaucoma Screening   05/04/2015    Annual Well Visit  07/04/2019    Flu Vaccine  08/01/2019

## 2019-08-16 NOTE — PROGRESS NOTES
ROOM # 6    Cyn Perry presents today for   Chief Complaint   Patient presents with    Annual Wellness Visit    Hypertension       Cyn Perry preferred language for health care discussion is english/other. Is someone accompanying this pt? no    Is the patient using any DME equipment during OV? no    Depression Screening:  3 most recent [facility] Screens 3/28/2019 7/3/2018 6/4/2018 5/11/2017 3/6/2017 8/15/2016 4/12/2016   Little interest or pleasure in doing things Not at all Not at all Not at all Not at all Not at all Not at all Not at all   Feeling down, depressed, irritable, or hopeless Not at all Not at all Not at all Not at all Not at all Not at all Not at all   Total Score PHQ 2 0 0 0 0 0 0 0       Learning Assessment:  Learning Assessment 4/13/2015   PRIMARY LEARNER Patient   HIGHEST LEVEL OF EDUCATION - PRIMARY LEARNER  DID NOT GRADUATE HIGH SCHOOL   BARRIERS PRIMARY LEARNER NONE   CO-LEARNER CAREGIVER No   PRIMARY LANGUAGE ENGLISH   LEARNER PREFERENCE PRIMARY DEMONSTRATION     LISTENING   ANSWERED BY patient   RELATIONSHIP SELF       Abuse Screening:  Abuse Screening Questionnaire 7/3/2018 5/11/2017 4/13/2015   Do you ever feel afraid of your partner? N N N   Are you in a relationship with someone who physically or mentally threatens you? N N N   Is it safe for you to go home? Nidia Kern       Fall Risk  Fall Risk Assessment, last 12 mths 3/28/2019 3/28/2019 7/3/2018 6/4/2018 10/3/2017 3/6/2017 8/15/2016   Able to walk? - Yes Yes Yes Yes Yes Yes   Fall in past 12 months? Yes Yes No No No No No   Fall with injury? No No - - - - -   Number of falls in past 12 months 1 1 - - - - -   Fall Risk Score 1 1 - - - - -           Health Maintenance reviewed and discussed per provider.  Yes    Josefa Hansen is due for   Health Maintenance Due   Topic Date Due    Shingrix Vaccine Age 50> (1 of 2) 05/04/2000    GLAUCOMA SCREENING Q2Y  05/04/2015    MEDICARE YEARLY EXAM  07/04/2019    Influenza Age 9 to Adult  08/01/2019 Please order/place referral if appropriate. Advance Directive:  1. Do you have an advance directive in place? Patient Reply: no    2. If not, would you like material regarding how to put one in place? Patient Reply: no    Coordination of Care:  1. Have you been to the ER, urgent care clinic since your last visit? Hospitalized since your last visit? no    2. Have you seen or consulted any other health care providers outside of the [address] [place] [address] since your last visit? Include any pap smears or colon screening.  Hematologist

## 2019-08-17 LAB
BASOPHILS # BLD AUTO: 0.1 X10E3/UL (ref 0–0.2)
BASOPHILS NFR BLD AUTO: 1 %
BUN SERPL-MCNC: 22 MG/DL (ref 8–27)
BUN/CREAT SERPL: 13 (ref 12–28)
CALCIUM SERPL-MCNC: 9.2 MG/DL (ref 8.7–10.3)
CHLORIDE SERPL-SCNC: 108 MMOL/L (ref 96–106)
CHOLEST SERPL-MCNC: 195 MG/DL (ref 100–199)
CO2 SERPL-SCNC: 19 MMOL/L (ref 20–29)
CREAT SERPL-MCNC: 1.68 MG/DL (ref 0.57–1)
EOSINOPHIL # BLD AUTO: 0.3 X10E3/UL (ref 0–0.4)
EOSINOPHIL NFR BLD AUTO: 5 %
ERYTHROCYTE [DISTWIDTH] IN BLOOD BY AUTOMATED COUNT: 16.3 % (ref 12.3–15.4)
GLUCOSE SERPL-MCNC: 92 MG/DL (ref 65–99)
HCT VFR BLD AUTO: 29.7 % (ref 34–46.6)
HDLC SERPL-MCNC: 61 MG/DL
HGB BLD-MCNC: 9.2 G/DL (ref 11.1–15.9)
IMM GRANULOCYTES # BLD AUTO: 0 X10E3/UL (ref 0–0.1)
IMM GRANULOCYTES NFR BLD AUTO: 0 %
INTERPRETATION, 910389: NORMAL
IRON SATN MFR SERPL: 12 % (ref 15–55)
IRON SERPL-MCNC: 35 UG/DL (ref 27–139)
LDLC SERPL CALC-MCNC: 116 MG/DL (ref 0–99)
LYMPHOCYTES # BLD AUTO: 1.6 X10E3/UL (ref 0.7–3.1)
LYMPHOCYTES NFR BLD AUTO: 23 %
MCH RBC QN AUTO: 25.8 PG (ref 26.6–33)
MCHC RBC AUTO-ENTMCNC: 31 G/DL (ref 31.5–35.7)
MCV RBC AUTO: 83 FL (ref 79–97)
MONOCYTES # BLD AUTO: 0.4 X10E3/UL (ref 0.1–0.9)
MONOCYTES NFR BLD AUTO: 6 %
NEUTROPHILS # BLD AUTO: 4.4 X10E3/UL (ref 1.4–7)
NEUTROPHILS NFR BLD AUTO: 65 %
PLATELET # BLD AUTO: 380 X10E3/UL (ref 150–450)
POTASSIUM SERPL-SCNC: 4.6 MMOL/L (ref 3.5–5.2)
RBC # BLD AUTO: 3.56 X10E6/UL (ref 3.77–5.28)
SODIUM SERPL-SCNC: 142 MMOL/L (ref 134–144)
TIBC SERPL-MCNC: 289 UG/DL (ref 250–450)
TRIGL SERPL-MCNC: 89 MG/DL (ref 0–149)
UIBC SERPL-MCNC: 254 UG/DL (ref 118–369)
VLDLC SERPL CALC-MCNC: 18 MG/DL (ref 5–40)
WBC # BLD AUTO: 6.8 X10E3/UL (ref 3.4–10.8)

## 2019-10-22 RX ORDER — LANOLIN ALCOHOL/MO/W.PET/CERES
CREAM (GRAM) TOPICAL
Qty: 30 TAB | Refills: 5 | Status: SHIPPED | OUTPATIENT
Start: 2019-10-22 | End: 2020-06-22

## 2019-12-04 ENCOUNTER — TELEPHONE (OUTPATIENT)
Dept: INTERNAL MEDICINE CLINIC | Age: 69
End: 2019-12-04

## 2019-12-04 RX ORDER — FAMOTIDINE 40 MG/1
40 TABLET, FILM COATED ORAL DAILY
Qty: 90 TAB | Refills: 1 | Status: SHIPPED | OUTPATIENT
Start: 2019-12-04 | End: 2019-12-04

## 2019-12-04 RX ORDER — CIMETIDINE 400 MG/1
400 TABLET, FILM COATED ORAL 2 TIMES DAILY
Qty: 60 TAB | Refills: 2 | Status: SHIPPED | OUTPATIENT
Start: 2019-12-04 | End: 2020-05-05 | Stop reason: SDUPTHER

## 2019-12-04 NOTE — TELEPHONE ENCOUNTER
Message received from [facility]. Requesting an alternative for the Ranitidine, states the medication is on backorder.

## 2019-12-04 NOTE — TELEPHONE ENCOUNTER
Call from pharmacist, states the Famotidine is also on backorder, could you send over Tagamet instead.

## 2019-12-26 ENCOUNTER — HOSPITAL ENCOUNTER (OUTPATIENT)
Dept: MAMMOGRAPHY | Age: 69
Discharge: HOME | End: 2019-12-26
Attending: INTERNAL MEDICINE
Payer: MEDICARE

## 2019-12-26 DIAGNOSIS — Z12.31 VISIT FOR SCREENING MAMMOGRAM: ICD-10-CM

## 2019-12-26 PROCEDURE — 77063 BREAST TOMOSYNTHESIS BI: CPT

## 2019-12-31 ENCOUNTER — OFFICE VISIT (OUTPATIENT)
Dept: INTERNAL MEDICINE CLINIC | Age: 69
End: 2019-12-31

## 2019-12-31 ENCOUNTER — HOSPITAL ENCOUNTER (OUTPATIENT)
Dept: LAB | Age: 69
Discharge: HOME | End: 2019-12-31
Payer: MEDICARE

## 2019-12-31 VITALS
WEIGHT: 229 LBS | OXYGEN SATURATION: 100 % | RESPIRATION RATE: 22 BRPM | BODY MASS INDEX: 39.09 KG/M2 | TEMPERATURE: 98 F | HEIGHT: 64 IN | DIASTOLIC BLOOD PRESSURE: 80 MMHG | HEART RATE: 91 BPM | SYSTOLIC BLOOD PRESSURE: 149 MMHG

## 2019-12-31 DIAGNOSIS — J31.0 RHINITIS, UNSPECIFIED TYPE: ICD-10-CM

## 2019-12-31 DIAGNOSIS — I73.9 PAD (PERIPHERAL ARTERY DISEASE) (HCC): ICD-10-CM

## 2019-12-31 DIAGNOSIS — N18.30 CKD (CHRONIC KIDNEY DISEASE) STAGE 3, GFR 30-59 ML/MIN (HCC): ICD-10-CM

## 2019-12-31 DIAGNOSIS — E78.00 HYPERCHOLESTEROLEMIA: Chronic | ICD-10-CM

## 2019-12-31 DIAGNOSIS — D64.9 ANEMIA, UNSPECIFIED TYPE: ICD-10-CM

## 2019-12-31 DIAGNOSIS — I10 ESSENTIAL HYPERTENSION: Chronic | ICD-10-CM

## 2019-12-31 DIAGNOSIS — Z23 ENCOUNTER FOR IMMUNIZATION: ICD-10-CM

## 2019-12-31 DIAGNOSIS — I10 ESSENTIAL HYPERTENSION: Primary | Chronic | ICD-10-CM

## 2019-12-31 DIAGNOSIS — Z76.0 MEDICATION REFILL: ICD-10-CM

## 2019-12-31 LAB
ALBUMIN SERPL-MCNC: 3.4 G/DL (ref 3.4–5)
ALBUMIN/GLOB SERPL: 0.8 {RATIO} (ref 0.8–1.7)
ALP SERPL-CCNC: 101 U/L (ref 45–117)
ALT SERPL-CCNC: 10 U/L (ref 13–56)
ANION GAP SERPL CALC-SCNC: 5 MMOL/L (ref 3–18)
AST SERPL-CCNC: 10 U/L (ref 10–38)
BASOPHILS # BLD: 0 K/UL (ref 0–0.1)
BASOPHILS NFR BLD: 1 % (ref 0–2)
BILIRUB SERPL-MCNC: 0.3 MG/DL (ref 0.2–1)
BUN SERPL-MCNC: 28 MG/DL (ref 7–18)
BUN/CREAT SERPL: 12 (ref 12–20)
CALCIUM SERPL-MCNC: 8.8 MG/DL (ref 8.5–10.1)
CHLORIDE SERPL-SCNC: 109 MMOL/L (ref 100–111)
CHOLEST SERPL-MCNC: 185 MG/DL
CO2 SERPL-SCNC: 26 MMOL/L (ref 21–32)
CREAT SERPL-MCNC: 2.43 MG/DL (ref 0.6–1.3)
DIFFERENTIAL METHOD BLD: ABNORMAL
EOSINOPHIL # BLD: 0.4 K/UL (ref 0–0.4)
EOSINOPHIL NFR BLD: 6 % (ref 0–5)
ERYTHROCYTE [DISTWIDTH] IN BLOOD BY AUTOMATED COUNT: 15.5 % (ref 11.6–14.5)
GLOBULIN SER CALC-MCNC: 4.4 G/DL (ref 2–4)
GLUCOSE SERPL-MCNC: 87 MG/DL (ref 74–99)
HCT VFR BLD AUTO: 29.6 % (ref 35–45)
HDLC SERPL-MCNC: 64 MG/DL (ref 40–60)
HDLC SERPL: 2.9 {RATIO} (ref 0–5)
HGB BLD-MCNC: 9.3 G/DL (ref 12–16)
LDLC SERPL CALC-MCNC: 103.8 MG/DL (ref 0–100)
LIPID PROFILE,FLP: ABNORMAL
LYMPHOCYTES # BLD: 1.6 K/UL (ref 0.9–3.6)
LYMPHOCYTES NFR BLD: 24 % (ref 21–52)
MCH RBC QN AUTO: 27.2 PG (ref 24–34)
MCHC RBC AUTO-ENTMCNC: 31.4 G/DL (ref 31–37)
MCV RBC AUTO: 86.5 FL (ref 74–97)
MONOCYTES # BLD: 0.8 K/UL (ref 0.05–1.2)
MONOCYTES NFR BLD: 11 % (ref 3–10)
NEUTS SEG # BLD: 3.9 K/UL (ref 1.8–8)
NEUTS SEG NFR BLD: 58 % (ref 40–73)
PLATELET # BLD AUTO: 369 K/UL (ref 135–420)
PMV BLD AUTO: 10.4 FL (ref 9.2–11.8)
POTASSIUM SERPL-SCNC: 4.3 MMOL/L (ref 3.5–5.5)
PROT SERPL-MCNC: 7.8 G/DL (ref 6.4–8.2)
RBC # BLD AUTO: 3.42 M/UL (ref 4.2–5.3)
SODIUM SERPL-SCNC: 140 MMOL/L (ref 136–145)
TRIGL SERPL-MCNC: 86 MG/DL (ref ?–150)
VLDLC SERPL CALC-MCNC: 17.2 MG/DL
WBC # BLD AUTO: 6.7 K/UL (ref 4.6–13.2)

## 2019-12-31 PROCEDURE — 80053 COMPREHEN METABOLIC PANEL: CPT

## 2019-12-31 PROCEDURE — 85025 COMPLETE CBC W/AUTO DIFF WBC: CPT

## 2019-12-31 PROCEDURE — 36415 COLL VENOUS BLD VENIPUNCTURE: CPT

## 2019-12-31 PROCEDURE — 80061 LIPID PANEL: CPT

## 2019-12-31 RX ORDER — TELMISARTAN AND HYDROCHLORTHIAZIDE 40; 12.5 MG/1; MG/1
1 TABLET ORAL DAILY
Qty: 90 TAB | Refills: 4 | Status: SHIPPED | OUTPATIENT
Start: 2019-12-31 | End: 2021-01-13

## 2019-12-31 RX ORDER — AZELASTINE 1 MG/ML
1 SPRAY, METERED NASAL 2 TIMES DAILY
Qty: 1 BOTTLE | Refills: 1 | Status: SHIPPED | OUTPATIENT
Start: 2019-12-31

## 2019-12-31 NOTE — PROGRESS NOTES
HISTORY OF PRESENT ILLNESS  Cyn Perry is a 71 y.o. female. Visit Vitals  /80 (BP 1 Location: Right arm)   Pulse 91   Temp 98 °F (36.7 °C) (Oral)   Resp 22   Ht 5' 4\" (1.626 m)   Wt 229 lb (103.9 kg)   SpO2 100%   BMI 39.31 kg/m²       Cholesterol Problem   The history is provided by the patient. This is a chronic problem. The current episode started more than 1 week ago. The problem occurs daily. Pertinent negatives include no chest pain, no headaches and no shortness of breath. Hypertension    Pertinent negatives include no chest pain, no palpitations, no blurred vision, no headaches, no dizziness and no shortness of breath. Epistaxis    The history is provided by the patient (while sitting in [employer] her nose started to bleed. Lasted about 15 minutes. She has some cold and sinus sxs recently). This is a new problem. The current episode started more than 2 days ago. Review of Systems   Constitutional: Negative for chills and fever. HENT: Positive for nosebleeds. Eyes: Negative for blurred vision and double vision. Respiratory: Positive for cough (intermittent cough). Negative for shortness of breath. Cardiovascular: Negative for chest pain, palpitations and leg swelling. Neurological: Negative for dizziness and headaches. Physical Exam  Vitals signs and nursing note reviewed. Constitutional:       General: She is not in acute distress. Appearance: She is well-developed. She is not diaphoretic. HENT:      Right Ear: Tympanic membrane, ear canal and external ear normal.      Left Ear: Tympanic membrane, ear canal and external ear normal.      Nose: Mucosal edema and congestion present. Right Turbinates: Enlarged. Left Turbinates: Enlarged. Right Sinus: No maxillary sinus tenderness or frontal sinus tenderness. Left Sinus: No maxillary sinus tenderness or frontal sinus tenderness. Comments: Did not see source of bleeding.  But mucosa is generally inflamed     Mouth/Throat:      Lips: Pink. Mouth: Mucous membranes are moist.      Pharynx: Oropharynx is clear. Cardiovascular:      Rate and Rhythm: Normal rate and regular rhythm. Pulmonary:      Effort: Pulmonary effort is normal.      Breath sounds: Normal breath sounds. Skin:     General: Skin is warm and dry. Neurological:      Mental Status: She is alert and oriented to person, place, and time. ASSESSMENT and PLAN    ICD-10-CM ICD-9-CM    1. Essential hypertension Q14 572.6 METABOLIC PANEL, COMPREHENSIVE   2. Hypercholesterolemia E78.00 272.0 LIPID PANEL   3. CKD (chronic kidney disease) stage 3, GFR 30-59 ml/min (HCC) L73.9 321.6 METABOLIC PANEL, COMPREHENSIVE   4. Rhinitis, unspecified type J31.0 472.0 azelastine (ASTELIN) 137 mcg (0.1 %) nasal spray   5. Anemia, unspecified type D64.9 285.9 CBC WITH AUTOMATED DIFF   6. Encounter for immunization Z23 V03.89 INFLUENZA VACCINE INACTIVATED (IIV), SUBUNIT, ADJUVANTED, IM      ADMIN INFLUENZA VIRUS VAC   7. Medication refill Z76.0 V68.1 telmisartan-hydroCHLOROthiazide (MICARDIS HCT) 40-12.5 mg per tablet       BP up a little    Need to recheck lab    I suspect a viral cold. Add astelin for her nose. Continue Robitussin for cough    Update lab today    Advised needs to have eye check up. Discussed BMI/weight, lifestyle, diet and exercise. Discussed effect on blood pressure, blood sugar, and joints especially  Focus on limiting white carbs, portion control, and moving more.     F/u 4-5 months for HTN, CHol,

## 2019-12-31 NOTE — PROGRESS NOTES
ROOM # 4    Cyn KELLY Perry presents today for   Chief Complaint   Patient presents with    Cholesterol Problem    Hypertension       Cyn MENDOZA [place] preferred language for health care discussion is english/other. Is someone accompanying this pt? no    Is the patient using any DME equipment during [address]? Cane      Depression Screening:  3 most recent [facility] Screens 3/28/2019 7/3/2018 6/4/2018 5/11/2017 3/6/2017 8/15/2016 4/12/2016   Little interest or pleasure in doing things Not at all Not at all Not at all Not at all Not at all Not at all Not at all   Feeling down, depressed, irritable, or hopeless Not at all Not at all Not at all Not at all Not at all Not at all Not at all   Total Score PHQ 2 0 0 0 0 0 0 0       Learning Assessment:  Learning Assessment 4/13/2015   PRIMARY LEARNER Patient   HIGHEST LEVEL OF EDUCATION - PRIMARY LEARNER  DID NOT GRADUATE HIGH SCHOOL   BARRIERS PRIMARY LEARNER NONE   CO-LEARNER CAREGIVER No   PRIMARY LANGUAGE ENGLISH   LEARNER PREFERENCE PRIMARY DEMONSTRATION     LISTENING   ANSWERED BY patient   RELATIONSHIP SELF       Abuse Screening:  Abuse Screening Questionnaire 8/16/2019 7/3/2018 5/11/2017 4/13/2015   Do you ever feel afraid of your partner? N N N N   Are you in a relationship with someone who physically or mentally threatens you? N N N N   Is it safe for you to go home? Azul Ware       Fall Risk  Fall Risk Assessment, last 12 mths 3/28/2019 3/28/2019 7/3/2018 6/4/2018 10/3/2017 3/6/2017 8/15/2016   Able to walk? - Yes Yes Yes Yes Yes Yes   Fall in past 12 months? Yes Yes No No No No No   Fall with injury? No No - - - - -   Number of falls in past 12 months 1 1 - - - - -   Fall Risk Score 1 1 - - - - -           Health Maintenance reviewed and discussed per provider. Yes    Oscar Haq is due for   Health Maintenance Due   Topic Date Due    GLAUCOMA SCREENING Q2Y  05/04/2015    Influenza Age 5 to Adult  08/01/2019     Please order/place referral if appropriate.       Advance Directive:  1. Do you have an advance directive in place? Patient Reply: no    2. If not, would you like material regarding how to put one in place? Patient Reply: no    Coordination of Care:  1. Have you been to the ER, urgent care clinic since your last visit? Hospitalized since your last visit? no    2. Have you seen or consulted any other health care providers outside of the [address] [place] [address] since your last visit? Include any pap smears or colon screening.  no

## 2019-12-31 NOTE — PROGRESS NOTES
Influenza immunization administered left deltoid  12/31/2019 by Casie Chong LPN with pt's consent. Patient tolerated procedure well. No reactions noted.

## 2020-01-02 NOTE — PROGRESS NOTES
Please advise her that her kidney function is way off again. I referred her to Dr. Tracy Salazar in 2018 (nephrologist). Was she ever seen there?

## 2020-01-07 DIAGNOSIS — N18.30 CKD (CHRONIC KIDNEY DISEASE) STAGE 3, GFR 30-59 ML/MIN (HCC): Primary | ICD-10-CM

## 2020-01-07 DIAGNOSIS — I10 ESSENTIAL HYPERTENSION: Chronic | ICD-10-CM

## 2020-01-07 NOTE — PROGRESS NOTES
Spoke with patient 2 identifiers was confirmed. Patient states she never was seen by Dr. John Mitchell.

## 2020-01-10 DIAGNOSIS — R05.9 COUGH: ICD-10-CM

## 2020-01-10 RX ORDER — RANITIDINE 300 MG/1
300 TABLET ORAL DAILY
Qty: 30 TAB | Refills: 5 | Status: SHIPPED | OUTPATIENT
Start: 2020-01-10 | End: 2020-04-17

## 2020-05-26 ENCOUNTER — TELEPHONE (OUTPATIENT)
Dept: INTERNAL MEDICINE CLINIC | Age: 70
End: 2020-05-26

## 2020-05-26 NOTE — TELEPHONE ENCOUNTER
Patient is calling in to make sure it is Ol to take the vitamins she was prescribed by her eye doctor. States she was told to take Ocuvite vitamins and Preservision. Ask to verify with PCP these are OK to take with her other meds.

## 2020-06-22 RX ORDER — LANOLIN ALCOHOL/MO/W.PET/CERES
CREAM (GRAM) TOPICAL
Qty: 30 TAB | Refills: 4 | Status: SHIPPED | OUTPATIENT
Start: 2020-06-22 | End: 2020-10-28

## 2020-07-22 DIAGNOSIS — I87.2 VENOUS STASIS DERMATITIS OF BOTH LOWER EXTREMITIES: ICD-10-CM

## 2020-07-22 DIAGNOSIS — I87.2 STASIS DERMATITIS OF BOTH LEGS: ICD-10-CM

## 2020-07-22 RX ORDER — CLOBETASOL PROPIONATE 0.5 MG/G
OINTMENT TOPICAL
Qty: 30 G | Refills: 4 | Status: SHIPPED | OUTPATIENT
Start: 2020-07-22 | End: 2022-10-18

## 2020-08-31 DIAGNOSIS — Z76.0 MEDICATION REFILL: ICD-10-CM

## 2020-08-31 RX ORDER — SIMVASTATIN 20 MG/1
TABLET, FILM COATED ORAL
Qty: 30 TAB | Refills: 5 | Status: SHIPPED | OUTPATIENT
Start: 2020-08-31 | End: 2021-02-25

## 2020-10-05 ENCOUNTER — DOCUMENTATION ONLY (OUTPATIENT)
Dept: INTERNAL MEDICINE CLINIC | Age: 70
End: 2020-10-05

## 2020-10-05 NOTE — PROGRESS NOTES
Received lab report on this patient who has not been seen since Dec 2019  H/H are low--but improved since our last report  Creatinine high but improved since our last visit    Pt needs to be scheduled for an appt for [address], among others unless she has changed doctors

## 2020-10-08 ENCOUNTER — TELEPHONE (OUTPATIENT)
Dept: INTERNAL MEDICINE CLINIC | Age: 70
End: 2020-10-08

## 2020-10-08 NOTE — TELEPHONE ENCOUNTER
Patient states she met with her hematologist and was told her iron is still a little low and told to take tow of the iron pills. Patient just wants to verify with you that it is OK to double up on theses pills.

## 2020-10-08 NOTE — TELEPHONE ENCOUNTER
That is fine. She may get a little more constipation with that and her stools may appear a little dark, but that is OK.

## 2020-10-28 RX ORDER — LANOLIN ALCOHOL/MO/W.PET/CERES
CREAM (GRAM) TOPICAL
Qty: 30 TAB | Refills: 3 | Status: SHIPPED | OUTPATIENT
Start: 2020-10-28 | End: 2021-02-09

## 2020-11-05 ENCOUNTER — OFFICE VISIT (OUTPATIENT)
Dept: INTERNAL MEDICINE CLINIC | Age: 70
End: 2020-11-05
Payer: MEDICARE

## 2020-11-05 VITALS
OXYGEN SATURATION: 100 % | HEIGHT: 64 IN | WEIGHT: 236 LBS | SYSTOLIC BLOOD PRESSURE: 132 MMHG | RESPIRATION RATE: 18 BRPM | TEMPERATURE: 97.1 F | DIASTOLIC BLOOD PRESSURE: 47 MMHG | BODY MASS INDEX: 40.29 KG/M2 | HEART RATE: 101 BPM

## 2020-11-05 DIAGNOSIS — E66.01 OBESITY, MORBID (HCC): ICD-10-CM

## 2020-11-05 DIAGNOSIS — I73.9 PAD (PERIPHERAL ARTERY DISEASE) (HCC): ICD-10-CM

## 2020-11-05 DIAGNOSIS — Z00.00 MEDICARE ANNUAL WELLNESS VISIT, SUBSEQUENT: Primary | ICD-10-CM

## 2020-11-05 DIAGNOSIS — E78.00 HYPERCHOLESTEROLEMIA: ICD-10-CM

## 2020-11-05 DIAGNOSIS — I87.2 STASIS DERMATITIS OF BOTH LEGS: ICD-10-CM

## 2020-11-05 DIAGNOSIS — N18.30 STAGE 3 CHRONIC KIDNEY DISEASE, UNSPECIFIED WHETHER STAGE 3A OR 3B CKD (HCC): ICD-10-CM

## 2020-11-05 DIAGNOSIS — I10 ESSENTIAL HYPERTENSION: ICD-10-CM

## 2020-11-05 DIAGNOSIS — Z23 NEEDS FLU SHOT: ICD-10-CM

## 2020-11-05 PROBLEM — N18.4 CKD (CHRONIC KIDNEY DISEASE) STAGE 4, GFR 15-29 ML/MIN (HCC): Status: ACTIVE | Noted: 2020-11-05

## 2020-11-05 PROCEDURE — G0444 DEPRESSION SCREEN ANNUAL: HCPCS | Performed by: INTERNAL MEDICINE

## 2020-11-05 PROCEDURE — G8427 DOCREV CUR MEDS BY ELIG CLIN: HCPCS | Performed by: INTERNAL MEDICINE

## 2020-11-05 PROCEDURE — G9899 SCRN MAM PERF RSLTS DOC: HCPCS | Performed by: INTERNAL MEDICINE

## 2020-11-05 PROCEDURE — G8510 SCR DEP NEG, NO PLAN REQD: HCPCS | Performed by: INTERNAL MEDICINE

## 2020-11-05 PROCEDURE — 99213 OFFICE O/P EST LOW 20 MIN: CPT | Performed by: INTERNAL MEDICINE

## 2020-11-05 PROCEDURE — G0008 ADMIN INFLUENZA VIRUS VAC: HCPCS

## 2020-11-05 PROCEDURE — 3017F COLORECTAL CA SCREEN DOC REV: CPT | Performed by: INTERNAL MEDICINE

## 2020-11-05 PROCEDURE — 90694 VACC AIIV4 NO PRSRV 0.5ML IM: CPT

## 2020-11-05 PROCEDURE — G0439 PPPS, SUBSEQ VISIT: HCPCS | Performed by: INTERNAL MEDICINE

## 2020-11-05 PROCEDURE — 1090F PRES/ABSN URINE INCON ASSESS: CPT | Performed by: INTERNAL MEDICINE

## 2020-11-05 PROCEDURE — G8417 CALC BMI ABV UP PARAM F/U: HCPCS | Performed by: INTERNAL MEDICINE

## 2020-11-05 PROCEDURE — 1101F PT FALLS ASSESS-DOCD LE1/YR: CPT | Performed by: INTERNAL MEDICINE

## 2020-11-05 PROCEDURE — G8399 PT W/DXA RESULTS DOCUMENT: HCPCS | Performed by: INTERNAL MEDICINE

## 2020-11-05 PROCEDURE — G8752 SYS BP LESS 140: HCPCS | Performed by: INTERNAL MEDICINE

## 2020-11-05 PROCEDURE — G8536 NO DOC ELDER MAL SCRN: HCPCS | Performed by: INTERNAL MEDICINE

## 2020-11-05 PROCEDURE — G8754 DIAS BP LESS 90: HCPCS | Performed by: INTERNAL MEDICINE

## 2020-11-05 NOTE — PROGRESS NOTES
Influenza immunization administered left deltoid  11/5/2020 by Isaac Espinosa LPN with pt's consent. Patient tolerated procedure well. No reactions noted.

## 2020-11-05 NOTE — PROGRESS NOTES
This is the Subsequent Medicare Annual Wellness Exam, performed 12 months or more after the Initial AWV or the last Subsequent AWV    I have reviewed the patient's medical history in detail and updated the computerized patient record. 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 Cleve Marks HX ENDOSCOPY  07/16/2019    EGD.  HX HEENT      dental problems     Current Outpatient Medications   Medication Sig Dispense Refill    ferrous sulfate 325 mg (65 mg iron) tablet TAKE 1 TABLET BY MOUTH DAILY (BEFORE BREAKFAST). INDICATIONS: ANEMIA FROM INADEQUATE IRON 30 Tab 3    simvastatin (ZOCOR) 20 mg tablet TAKE 1 TABLET BY MOUTH NIGHTLY. 30 Tab 5    clobetasoL (TEMOVATE) 0.05 % ointment APPLY TO LEGS TWICE DAILY AS NEEDED FOR 2 WEEKS THEN STOP FOR A WEEK AND REPEAT AS NEEDED 30 g 4    cimetidine (TAGAMET) 400 mg tablet Take 1 Tab by mouth two (2) times a day. 60 Tab 5    telmisartan-hydroCHLOROthiazide (MICARDIS HCT) 40-12.5 mg per tablet Take 1 Tab by mouth daily.  90 Tab 4    azelastine (ASTELIN) 137 mcg (0.1 %) nasal spray 1 Spray by Both Nostrils route two (2) times a day. Use in each nostril as directed  Indications: Vasomotor Rhinitis 1 Bottle 1    ammonium lactate (LAC-HYDRIN) 12 % lotion Apply daily to legs after bathing 820 mL 5    folic acid/multivit-min/lutein (CENTRUM SILVER PO) Take  by mouth.  VARICELLA-ZOSTER VIRUS INFECTION PROPHYLAXIS 19,400 unit/0.65 mL susr injection        No Known Allergies    Family History   Problem Relation Age of Onset    Diabetes Mother     Diabetes Father      Social History     Tobacco Use    Smoking status: Former Smoker    Smokeless tobacco: Never Used    Tobacco comment: quit 1965   Substance Use Topics    Alcohol use: No       Depression Risk Factor Screening:     3 most recent PHQ Screens 11/5/2020   Little interest or pleasure in doing things Not at all   Feeling down, depressed, irritable, or hopeless Not at all   Total Score PHQ 2 0       Alcohol Risk Screen   Do you average more than 1 drink per night or more than 7 drinks a week:  No    On any one occasion in the past three months have you have had more than 3 drinks containing alcohol:  No        Functional Ability and Level of Safety:   Hearing: Hearing is good. Activities of Daily Living: The home contains: grab bars and shower chair  Patient needs help with:  transportation and walking     Ambulation: with difficulty, uses a walker     Fall Risk:  Fall Risk Assessment, last 12 mths 11/5/2020   Able to walk? Yes   Fall in past 12 months?  No   Fall with injury? -   Number of falls in past 12 months -   Fall Risk Score -     Abuse Screen:  Patient is not abused       Cognitive Screening   Has your family/caregiver stated any concerns about your memory: no     Cognitive Screening: Normal - Animal Naming Test    Patient Care Team   Patient Care Team:  Vero Gibbons MD as PCP - General (Internal Medicine)  Vero Gibbons MD as PCP - [facility] Empaneled Provider  Kateryna Ferreira MD as Consulting Provider (Gastroenterology)  Shahab Woodard MD as Consulting Provider (Ophthalmology)    Assessment/Plan   Education and counseling provided:  Are appropriate based on today's review and evaluation    Diagnoses and all orders for this visit:    1. Medicare annual wellness visit, subsequent    2.  Needs flu shot  -     FLU (FLUAD QUAD INFLUENZA VACCINE,QUAD,ADJUVANTED)        Health Maintenance Due   Topic Date Due    GLAUCOMA SCREENING Q2Y  05/04/2015    Medicare Yearly Exam  08/16/2020    Flu Vaccine (1) 09/01/2020

## 2020-11-05 NOTE — PROGRESS NOTES
ROOM # 6    Cyn D Leonard presents today for   Chief Complaint   Patient presents with    Annual Wellness Visit       Cyn MENDOZA [place] preferred language for health care discussion is english/other. Is someone accompanying this pt? no    Is the patient using any DME equipment during [address]? cane    Depression Screening:  3 most recent [facility] [address] Screens 3/28/2019 7/3/2018 6/4/2018 5/11/2017 3/6/2017 8/15/2016 4/12/2016   Little interest or pleasure in doing things Not at all Not at all Not at all Not at all Not at all Not at all Not at all   Feeling down, depressed, irritable, or hopeless Not at all Not at all Not at all Not at all Not at all Not at all Not at all   Total Score PHQ 2 0 0 0 0 0 0 0       Learning Assessment:  Learning Assessment 4/13/2015   PRIMARY LEARNER Patient   HIGHEST LEVEL OF EDUCATION - PRIMARY LEARNER  DID NOT GRADUATE HIGH SCHOOL   BARRIERS PRIMARY LEARNER NONE   CO-LEARNER CAREGIVER No   PRIMARY LANGUAGE ENGLISH   LEARNER PREFERENCE PRIMARY DEMONSTRATION     LISTENING   ANSWERED BY patient   RELATIONSHIP SELF       Abuse Screening:  Abuse Screening Questionnaire 8/16/2019 7/3/2018 5/11/2017 4/13/2015   Do you ever feel afraid of your partner? N N N N   Are you in a relationship with someone who physically or mentally threatens you? N N N N   Is it safe for you to go home? Bakari Median       Fall Risk  Fall Risk Assessment, last 12 mths 3/28/2019 3/28/2019 7/3/2018 6/4/2018 10/3/2017 3/6/2017 8/15/2016   Able to walk? - Yes Yes Yes Yes Yes Yes   Fall in past 12 months? Yes Yes No No No No No   Fall with injury? No No - - - - -   Number of falls in past 12 months 1 1 - - - - -   Fall Risk Score 1 1 - - - - -           Health Maintenance reviewed and discussed per provider.  Yes    Jie Nicholson is due for   Health Maintenance Due   Topic Date Due    Shingrix Vaccine Age 49> (1 of 2) 05/04/2000    GLAUCOMA SCREENING Q2Y  05/04/2015    Medicare Yearly Exam  08/16/2020    Flu Vaccine (1) 09/01/2020     Please order/place referral if appropriate. Advance Directive:  1. Do you have an advance directive in place? Patient Reply: no    2. If not, would you like material regarding how to put one in place? Patient Reply: no    Coordination of Care:  1. Have you been to the ER, urgent care clinic since your last visit? Hospitalized since your last visit? no    2. Have you seen or consulted any other health care providers outside of the [address] [place] [address] since your last visit?  Include any pap smears or colon screening. hemtologist

## 2020-11-05 NOTE — PATIENT INSTRUCTIONS
Medicare Wellness Visit, Female     The best way to live healthy is to have a lifestyle where you eat a well-balanced diet, exercise regularly, limit alcohol use, and quit all forms of tobacco/nicotine, if applicable. Regular preventive services are another way to keep healthy. Preventive services (vaccines, screening tests, monitoring & exams) can help personalize your care plan, which helps you manage your own care. Screening tests can find health problems at the earliest stages, when they are easiest to treat. [facility] follows the current, evidence-based guidelines published by the [facility] Scott ([facility]) when recommending preventive services for our patients. Because we follow these guidelines, sometimes recommendations change over time as research supports it. (For example, mammograms used to be recommended annually. Even though Medicare will still pay for an annual mammogram, the newer guidelines recommend a mammogram every two years for women of average risk). Of course, you and your doctor may decide to screen more often for some diseases, based on your risk and your co-morbidities (chronic disease you are already diagnosed with). Preventive services for you include:  - Medicare offers their members a free annual wellness visit, which is time for you and your primary care provider to discuss and plan for your preventive service needs. Take advantage of this benefit every year!  -All adults over the age of 72 should receive the recommended pneumonia vaccines. Current USPSTF guidelines recommend a series of two vaccines for the best pneumonia protection.   -All adults should have a flu vaccine yearly and a tetanus vaccine every 10 years.   -All adults age 48 and older should receive the shingles vaccines (series of two vaccines).       -All adults age 38-68 who are overweight should have a diabetes screening test once every three years.   -All adults born between [birth year] and 1965 should be screened once for Hepatitis C.  -Other screening tests and preventive services for persons with diabetes include: an eye exam to screen for diabetic retinopathy, a kidney function test, a foot exam, and stricter control over your cholesterol.   -Cardiovascular screening for adults with routine risk involves an electrocardiogram (ECG) at intervals determined by your doctor.   -Colorectal cancer screenings should be done for adults age 54-65 with no increased risk factors for colorectal cancer. There are a number of acceptable methods of screening for this type of cancer. Each test has its own benefits and drawbacks. Discuss with your doctor what is most appropriate for you during your annual wellness visit. The different tests include: colonoscopy (considered the best screening method), a fecal occult blood test, a fecal DNA test, and sigmoidoscopy.    -A bone mass density test is recommended when a woman turns 65 to screen for osteoporosis. This test is only recommended one time, as a screening. Some providers will use this same test as a disease monitoring tool if you already have osteoporosis. -Breast cancer screenings are recommended every other year for women of normal risk, age 54-69.  -Cervical cancer screenings for women over age 72 are only recommended with certain risk factors.      Here is a list of your current Health Maintenance items (your personalized list of preventive services) with a due date:  Health Maintenance Due   Topic Date Due    Glaucoma Screening   05/04/2015    Annual Well Visit  08/16/2020    Yearly Flu Vaccine (1) 09/01/2020

## 2020-11-05 NOTE — PROGRESS NOTES
HISTORY OF PRESENT ILLNESS  Cyn Perry is a 79 y.o. female. BP (!) 132/47 (BP 1 Location: Left arm, BP Patient Position: Sitting)   Pulse (!) 101   Temp 97.1 °F (36.2 °C) (Temporal)   Resp 18   Ht 5' 4\" (1.626 m)   Wt 236 lb (107 kg)   SpO2 100%   BMI 40.51 kg/m²     Hypertension    The history is provided by the patient. This is a chronic problem. The current episode started more than 1 week ago. The problem has not changed since onset. Pertinent negatives include no chest pain, no palpitations, no headaches, no dizziness and no shortness of breath. There are no associated agents to hypertension. Risk factors include obesity, postmenopause, hypertension and a sedentary lifestyle. Cholesterol Problem   Pertinent negatives include no chest pain, no headaches and no shortness of breath. Review of Systems   Constitutional: Negative for chills and fever. Respiratory: Negative for cough and shortness of breath. Cardiovascular: Positive for leg swelling. Negative for chest pain and palpitations. Neurological: Negative for dizziness and headaches. Physical Exam  Vitals signs and nursing note reviewed. Constitutional:       Appearance: Normal appearance. She is well-developed. Cardiovascular:      Rate and Rhythm: Normal rate and regular rhythm. Pulmonary:      Effort: Pulmonary effort is normal.      Breath sounds: Normal breath sounds. Musculoskeletal:      Right lower leg: Edema present. Left lower leg: Edema present. Skin:     General: Skin is warm and dry. Neurological:      General: No focal deficit present. Mental Status: She is alert and oriented to person, place, and time. Psychiatric:         Mood and Affect: Mood normal.         Behavior: Behavior normal.         ASSESSMENT and PLAN    ICD-10-CM ICD-9-CM                  3. Essential hypertension  I10 401.9    4. Hypercholesterolemia  E78.00 272.0    5. PAD (peripheral artery disease) (HCC)  I73.9 443.9    6. Obesity, morbid ([facility] [address]  E66.01 278.01    7. Stasis dermatitis of both legs  I87.2 454.1    8. Stage 3 chronic kidney disease, unspecified whether stage 3a or 3b CKD  N18.30 585. 3        BP controlled    Discussed BMI/weight, lifestyle, diet and exercise. Discussed effect on blood pressure, blood sugar, and joints especially  Focus on limiting white carbs, portion control, and moving more. Last chol a year ago was OK. She recently had other lab so will not stick her today.  Only chol was not done    PAD stable    F/u 6 months for HTN, chol, weight

## 2020-12-21 RX ORDER — CIMETIDINE 400 MG/1
TABLET, FILM COATED ORAL
Qty: 60 TAB | Refills: 4 | Status: SHIPPED | OUTPATIENT
Start: 2020-12-21 | End: 2021-07-11

## 2021-01-13 DIAGNOSIS — Z76.0 MEDICATION REFILL: ICD-10-CM

## 2021-01-13 RX ORDER — TELMISARTAN AND HYDROCHLORTHIAZIDE 40; 12.5 MG/1; MG/1
TABLET ORAL
Qty: 90 TAB | Refills: 3 | Status: SHIPPED | OUTPATIENT
Start: 2021-01-13 | End: 2022-03-07

## 2021-01-27 ENCOUNTER — TELEPHONE (OUTPATIENT)
Dept: INTERNAL MEDICINE CLINIC | Age: 71
End: 2021-01-27

## 2021-01-27 NOTE — TELEPHONE ENCOUNTER
She would need to contact her insurance to see if they cover an aide. Most Medicare does not. And, I doubt they would send anyone into a home with known COVID    There is no medication to take for the virus.

## 2021-01-27 NOTE — TELEPHONE ENCOUNTER
Patient is calling in to let you know she tested positive for COVID on 1/22/21. Would like to know if there is any way to get an aide of some sort in to help here since she is all alone at home. States she needs help doing pretty much everything and was not given any type of medication to take for the virus or told what she can take. I asked which symptoms she was having but she did not answer me, just kept stating she needs help.

## 2021-02-09 RX ORDER — LANOLIN ALCOHOL/MO/W.PET/CERES
CREAM (GRAM) TOPICAL
Qty: 30 TAB | Refills: 2 | Status: SHIPPED | OUTPATIENT
Start: 2021-02-09 | End: 2021-05-10

## 2021-02-25 DIAGNOSIS — Z76.0 MEDICATION REFILL: ICD-10-CM

## 2021-02-25 RX ORDER — SIMVASTATIN 20 MG/1
TABLET, FILM COATED ORAL
Qty: 30 TAB | Refills: 4 | Status: SHIPPED | OUTPATIENT
Start: 2021-02-25 | End: 2021-07-31

## 2021-03-17 ENCOUNTER — HOSPITAL ENCOUNTER (OUTPATIENT)
Dept: MAMMOGRAPHY | Age: 71
Discharge: HOME | End: 2021-03-17
Attending: INTERNAL MEDICINE
Payer: MEDICARE

## 2021-03-17 DIAGNOSIS — Z12.31 VISIT FOR SCREENING MAMMOGRAM: ICD-10-CM

## 2021-03-17 PROCEDURE — 77063 BREAST TOMOSYNTHESIS BI: CPT

## 2021-05-10 RX ORDER — FERROUS SULFATE TAB 325 MG (65 MG ELEMENTAL FE) 325 (65 FE) MG
TAB ORAL
Qty: 30 TAB | Refills: 1 | Status: SHIPPED | OUTPATIENT
Start: 2021-05-10 | End: 2021-07-02

## 2021-07-02 RX ORDER — FERROUS SULFATE TAB 325 MG (65 MG ELEMENTAL FE) 325 (65 FE) MG
TAB ORAL
Qty: 30 TABLET | Refills: 0 | Status: SHIPPED | OUTPATIENT
Start: 2021-07-02 | End: 2021-08-02

## 2021-07-02 NOTE — TELEPHONE ENCOUNTER
Spoke with pt in regards to ferrous sulfate refill, two pt identifier's verified. Relayed the 's note, pt is agreeable and is scheduled for 8/3/21. Pt voices no concerns at this time.

## 2021-07-11 RX ORDER — CIMETIDINE 400 MG/1
TABLET, FILM COATED ORAL
Qty: 60 TABLET | Refills: 3 | Status: SHIPPED | OUTPATIENT
Start: 2021-07-11 | End: 2021-12-21

## 2021-07-31 DIAGNOSIS — Z76.0 MEDICATION REFILL: ICD-10-CM

## 2021-07-31 RX ORDER — SIMVASTATIN 20 MG/1
TABLET, FILM COATED ORAL
Qty: 30 TABLET | Refills: 3 | Status: SHIPPED | OUTPATIENT
Start: 2021-07-31 | End: 2021-12-20

## 2021-08-13 ENCOUNTER — OFFICE VISIT (OUTPATIENT)
Dept: INTERNAL MEDICINE CLINIC | Age: 71
End: 2021-08-13
Payer: MEDICARE

## 2021-08-13 VITALS
RESPIRATION RATE: 18 BRPM | HEART RATE: 99 BPM | HEIGHT: 64 IN | SYSTOLIC BLOOD PRESSURE: 129 MMHG | TEMPERATURE: 98.5 F | BODY MASS INDEX: 38.58 KG/M2 | OXYGEN SATURATION: 98 % | WEIGHT: 226 LBS | DIASTOLIC BLOOD PRESSURE: 76 MMHG

## 2021-08-13 DIAGNOSIS — R21 RASH: ICD-10-CM

## 2021-08-13 DIAGNOSIS — I87.2 VENOUS STASIS DERMATITIS, UNSPECIFIED LATERALITY: ICD-10-CM

## 2021-08-13 DIAGNOSIS — I10 ESSENTIAL HYPERTENSION: Primary | ICD-10-CM

## 2021-08-13 DIAGNOSIS — I73.9 PAD (PERIPHERAL ARTERY DISEASE) (HCC): ICD-10-CM

## 2021-08-13 DIAGNOSIS — L29.9 PRURITUS: ICD-10-CM

## 2021-08-13 DIAGNOSIS — E66.01 SEVERE OBESITY (BMI 35.0-35.9 WITH COMORBIDITY) (HCC): ICD-10-CM

## 2021-08-13 DIAGNOSIS — E78.00 HYPERCHOLESTEROLEMIA: ICD-10-CM

## 2021-08-13 DIAGNOSIS — N18.4 CKD (CHRONIC KIDNEY DISEASE) STAGE 4, GFR 15-29 ML/MIN (HCC): ICD-10-CM

## 2021-08-13 PROCEDURE — G8752 SYS BP LESS 140: HCPCS | Performed by: INTERNAL MEDICINE

## 2021-08-13 PROCEDURE — G8399 PT W/DXA RESULTS DOCUMENT: HCPCS | Performed by: INTERNAL MEDICINE

## 2021-08-13 PROCEDURE — G8754 DIAS BP LESS 90: HCPCS | Performed by: INTERNAL MEDICINE

## 2021-08-13 PROCEDURE — 99214 OFFICE O/P EST MOD 30 MIN: CPT | Performed by: INTERNAL MEDICINE

## 2021-08-13 PROCEDURE — G8432 DEP SCR NOT DOC, RNG: HCPCS | Performed by: INTERNAL MEDICINE

## 2021-08-13 PROCEDURE — 3017F COLORECTAL CA SCREEN DOC REV: CPT | Performed by: INTERNAL MEDICINE

## 2021-08-13 PROCEDURE — G8536 NO DOC ELDER MAL SCRN: HCPCS | Performed by: INTERNAL MEDICINE

## 2021-08-13 PROCEDURE — G8417 CALC BMI ABV UP PARAM F/U: HCPCS | Performed by: INTERNAL MEDICINE

## 2021-08-13 PROCEDURE — 1101F PT FALLS ASSESS-DOCD LE1/YR: CPT | Performed by: INTERNAL MEDICINE

## 2021-08-13 PROCEDURE — 1090F PRES/ABSN URINE INCON ASSESS: CPT | Performed by: INTERNAL MEDICINE

## 2021-08-13 PROCEDURE — G9899 SCRN MAM PERF RSLTS DOC: HCPCS | Performed by: INTERNAL MEDICINE

## 2021-08-13 PROCEDURE — G8427 DOCREV CUR MEDS BY ELIG CLIN: HCPCS | Performed by: INTERNAL MEDICINE

## 2021-08-13 RX ORDER — GABAPENTIN 100 MG/1
100 CAPSULE ORAL 2 TIMES DAILY
Qty: 60 CAPSULE | Refills: 1 | Status: SHIPPED | OUTPATIENT
Start: 2021-08-13

## 2021-08-13 RX ORDER — FUROSEMIDE 40 MG/1
1 TABLET ORAL DAILY
COMMUNITY
Start: 2021-07-21

## 2021-08-13 RX ORDER — HYDROXYZINE 25 MG/1
25 TABLET, FILM COATED ORAL
Qty: 90 TABLET | Refills: 5 | Status: SHIPPED | OUTPATIENT
Start: 2021-08-13 | End: 2022-09-09 | Stop reason: SDUPTHER

## 2021-08-13 NOTE — PROGRESS NOTES
HISTORY OF PRESENT ILLNESS  Cyn Perry is a 70 y.o. female. /76 (BP 1 Location: Left upper arm, BP Patient Position: Sitting, BP Cuff Size: Large adult)   Pulse 99   Temp 98.5 °F (36.9 °C) (Temporal)   Resp 18   Ht 5' 4\" (1.626 m)   Wt 226 lb (102.5 kg)   SpO2 98%   BMI 38.79 kg/m²         Current Outpatient Medications:     furosemide (LASIX) 40 mg tablet, Take 1 Tablet by mouth daily. Indications: visible water retention, Disp: , Rfl:     FeroSuL 325 mg (65 mg iron) tablet, TAKE 1 TABLET BY MOUTH EVERY DAY BEFORE BREAKFAST, Disp: 30 Tablet, Rfl: 5    simvastatin (ZOCOR) 20 mg tablet, TAKE 1 TABLET BY MOUTH DAILY AT BEDTIME, Disp: 30 Tablet, Rfl: 3    cimetidine (TAGAMET) 400 mg tablet, TAKE 1 TABLET BY MOUTH TWO (2) TIMES A DAY., Disp: 60 Tablet, Rfl: 3    telmisartan-hydroCHLOROthiazide (MICARDIS HCT) 40-12.5 mg per tablet, TAKE 1 TABLET BY MOUTH DAILY. , Disp: 90 Tab, Rfl: 3    azelastine (ASTELIN) 137 mcg (0.1 %) nasal spray, 1 [place] by Both Nostrils route two (2) times a day. Use in each nostril as directed  Indications: Vasomotor Rhinitis, Disp: 1 Bottle, Rfl: 1    ammonium lactate (LAC-HYDRIN) 12 % lotion, Apply daily to legs after bathing, Disp: 400 mL, Rfl: 5    folic acid/multivit-min/lutein (CENTRUM SILVER PO), Take  by mouth., Disp: , Rfl:     clobetasoL (TEMOVATE) 0.05 % ointment, APPLY TO LEGS TWICE DAILY AS NEEDED FOR 2 WEEKS THEN STOP FOR A WEEK AND REPEAT AS NEEDED, Disp: 30 g, Rfl: 4    VARICELLA-ZOSTER VIRUS INFECTION PROPHYLAXIS 19,400 unit/0.65 mL susr injection, , Disp: , Rfl:       Hypertension   The history is provided by the patient. This is a chronic problem. The current episode started more than 1 week ago. The problem has not changed since onset. Pertinent negatives include no chest pain and no shortness of breath. There are no associated agents to hypertension. Risk factors include dyslipidemia. Cholesterol Problem  The history is provided by the patient.  This is a chronic problem. The current episode started more than 1 week ago. The problem occurs daily. The problem has not changed since onset. Pertinent negatives include no chest pain and no shortness of breath. Review of Systems   Constitutional: Negative for chills and fever. Respiratory: Negative for cough and shortness of breath. Cardiovascular: Negative for chest pain. Skin: Positive for itching and rash. Physical Exam  Vitals and nursing note reviewed. Constitutional:       Appearance: Normal appearance. She is well-developed. Cardiovascular:      Rate and Rhythm: Normal rate and regular rhythm. Pulmonary:      Effort: Pulmonary effort is normal.      Breath sounds: Normal breath sounds. Skin:     General: Skin is warm and dry. Neurological:      General: No focal deficit present. Mental Status: She is alert and oriented to person, place, and time. Psychiatric:         Mood and Affect: Mood normal.         Behavior: Behavior normal.         ASSESSMENT and PLAN    ICD-10-CM ICD-9-CM    1. Essential hypertension  B23 565.9 METABOLIC PANEL, COMPREHENSIVE      LIPID PANEL      TSH 3RD GENERATION   2. CKD (chronic kidney disease) stage 4, GFR 15-29 ml/min ([facility])  A74.6 734.0 METABOLIC PANEL, COMPREHENSIVE      CBC WITH AUTOMATED DIFF   3. Hypercholesterolemia  E78.00 272.0 LIPID PANEL   4. Venous stasis dermatitis, unspecified laterality  I87.2 454.1 CBC WITH AUTOMATED DIFF   5. Pruritus  S48.2 887.9 METABOLIC PANEL, COMPREHENSIVE      CBC WITH AUTOMATED DIFF      TSH 3RD GENERATION   6. PAD (peripheral artery disease) ([facility])  I73.9 443.9    7. Severe obesity (BMI 35.0-35.9 with comorbidity) ([facility])  E66.01 278.01     Z68.35 V85.35        Extensive rashing on upper body as well as venous stasis  Add atarax for itching  Recommend aveeno lotion  Will try adding gabapentin 100 mg BID    Update lab    Refer back to derm    Hold simvastatin for now.  We are going to try the process of elimination to see if a medication is causing the itching. Next, consider the telmisartan.     F/u one month for recheck

## 2021-08-14 LAB
ALBUMIN SERPL-MCNC: 4.2 G/DL (ref 3.7–4.7)
ALBUMIN/GLOB SERPL: 1.2 {RATIO} (ref 1.2–2.2)
ALP SERPL-CCNC: 91 IU/L (ref 48–121)
ALT SERPL-CCNC: 8 IU/L (ref 0–32)
AST SERPL-CCNC: 15 IU/L (ref 0–40)
BASOPHILS # BLD AUTO: 0.1 X10E3/UL (ref 0–0.2)
BASOPHILS NFR BLD AUTO: 1 %
BILIRUB SERPL-MCNC: 0.3 MG/DL (ref 0–1.2)
BUN SERPL-MCNC: 36 MG/DL (ref 8–27)
BUN/CREAT SERPL: 13 (ref 12–28)
CALCIUM SERPL-MCNC: 9.2 MG/DL (ref 8.7–10.3)
CHLORIDE SERPL-SCNC: 104 MMOL/L (ref 96–106)
CHOLEST SERPL-MCNC: 174 MG/DL (ref 100–199)
CO2 SERPL-SCNC: 21 MMOL/L (ref 20–29)
CREAT SERPL-MCNC: 2.71 MG/DL (ref 0.57–1)
EOSINOPHIL # BLD AUTO: 0.5 X10E3/UL (ref 0–0.4)
EOSINOPHIL NFR BLD AUTO: 6 %
ERYTHROCYTE [DISTWIDTH] IN BLOOD BY AUTOMATED COUNT: 13.5 % (ref 11.7–15.4)
GLOBULIN SER CALC-MCNC: 3.4 G/DL (ref 1.5–4.5)
GLUCOSE SERPL-MCNC: 98 MG/DL (ref 65–99)
HCT VFR BLD AUTO: 29.3 % (ref 34–46.6)
HDLC SERPL-MCNC: 53 MG/DL
HGB BLD-MCNC: 9.3 G/DL (ref 11.1–15.9)
IMM GRANULOCYTES # BLD AUTO: 0 X10E3/UL (ref 0–0.1)
IMM GRANULOCYTES NFR BLD AUTO: 0 %
IMP & REVIEW OF LAB RESULTS: NORMAL
LDLC SERPL CALC-MCNC: 105 MG/DL (ref 0–99)
LYMPHOCYTES # BLD AUTO: 1.4 X10E3/UL (ref 0.7–3.1)
LYMPHOCYTES NFR BLD AUTO: 18 %
MCH RBC QN AUTO: 27.3 PG (ref 26.6–33)
MCHC RBC AUTO-ENTMCNC: 31.7 G/DL (ref 31.5–35.7)
MCV RBC AUTO: 86 FL (ref 79–97)
MONOCYTES # BLD AUTO: 0.4 X10E3/UL (ref 0.1–0.9)
MONOCYTES NFR BLD AUTO: 5 %
NEUTROPHILS # BLD AUTO: 5.4 X10E3/UL (ref 1.4–7)
NEUTROPHILS NFR BLD AUTO: 70 %
PLATELET # BLD AUTO: 326 X10E3/UL (ref 150–450)
POTASSIUM SERPL-SCNC: 4.4 MMOL/L (ref 3.5–5.2)
PROT SERPL-MCNC: 7.6 G/DL (ref 6–8.5)
RBC # BLD AUTO: 3.41 X10E6/UL (ref 3.77–5.28)
SODIUM SERPL-SCNC: 142 MMOL/L (ref 134–144)
TRIGL SERPL-MCNC: 86 MG/DL (ref 0–149)
TSH SERPL DL<=0.005 MIU/L-ACNC: 1.22 UIU/ML (ref 0.45–4.5)
VLDLC SERPL CALC-MCNC: 16 MG/DL (ref 5–40)
WBC # BLD AUTO: 7.8 X10E3/UL (ref 3.4–10.8)

## 2021-10-07 ENCOUNTER — OFFICE VISIT (OUTPATIENT)
Dept: INTERNAL MEDICINE CLINIC | Age: 71
End: 2021-10-07
Payer: MEDICARE

## 2021-10-07 VITALS
OXYGEN SATURATION: 97 % | DIASTOLIC BLOOD PRESSURE: 74 MMHG | TEMPERATURE: 97.5 F | WEIGHT: 226 LBS | HEART RATE: 107 BPM | RESPIRATION RATE: 20 BRPM | HEIGHT: 64 IN | BODY MASS INDEX: 38.58 KG/M2 | SYSTOLIC BLOOD PRESSURE: 148 MMHG

## 2021-10-07 DIAGNOSIS — N18.4 CKD (CHRONIC KIDNEY DISEASE) STAGE 4, GFR 15-29 ML/MIN (HCC): ICD-10-CM

## 2021-10-07 DIAGNOSIS — I10 ESSENTIAL HYPERTENSION: ICD-10-CM

## 2021-10-07 DIAGNOSIS — S00.03XA CONTUSION OF SCALP, INITIAL ENCOUNTER: Primary | ICD-10-CM

## 2021-10-07 PROCEDURE — G8536 NO DOC ELDER MAL SCRN: HCPCS | Performed by: INTERNAL MEDICINE

## 2021-10-07 PROCEDURE — G8754 DIAS BP LESS 90: HCPCS | Performed by: INTERNAL MEDICINE

## 2021-10-07 PROCEDURE — 1101F PT FALLS ASSESS-DOCD LE1/YR: CPT | Performed by: INTERNAL MEDICINE

## 2021-10-07 PROCEDURE — 3017F COLORECTAL CA SCREEN DOC REV: CPT | Performed by: INTERNAL MEDICINE

## 2021-10-07 PROCEDURE — G8753 SYS BP > OR = 140: HCPCS | Performed by: INTERNAL MEDICINE

## 2021-10-07 PROCEDURE — G8427 DOCREV CUR MEDS BY ELIG CLIN: HCPCS | Performed by: INTERNAL MEDICINE

## 2021-10-07 PROCEDURE — 99213 OFFICE O/P EST LOW 20 MIN: CPT | Performed by: INTERNAL MEDICINE

## 2021-10-07 PROCEDURE — G8510 SCR DEP NEG, NO PLAN REQD: HCPCS | Performed by: INTERNAL MEDICINE

## 2021-10-07 PROCEDURE — G8417 CALC BMI ABV UP PARAM F/U: HCPCS | Performed by: INTERNAL MEDICINE

## 2021-10-07 PROCEDURE — 1090F PRES/ABSN URINE INCON ASSESS: CPT | Performed by: INTERNAL MEDICINE

## 2021-10-07 PROCEDURE — G9899 SCRN MAM PERF RSLTS DOC: HCPCS | Performed by: INTERNAL MEDICINE

## 2021-10-07 PROCEDURE — G8399 PT W/DXA RESULTS DOCUMENT: HCPCS | Performed by: INTERNAL MEDICINE

## 2021-10-07 NOTE — PROGRESS NOTES
Progress Note    Patient: Ernie Bass               Sex: female                  Date of Birth:  [birth year]      Age:  70 y.o.                    HPI:     Ernie Bass is a 70 y.o. female who has been seen for hitting her head on refrigerator 2 weeks ago   She had pain x 2 days and none since then . She has CKD and sees Nephrologist 10/28   Past Medical History:   Diagnosis Date    Anemia NEC     Atrophic vaginitis     CKD (chronic kidney disease) stage 3, GFR 30-59 ml/min ([facility]     COVID-19 2021    Elephantiasis nostra verrucosa 2018    H/O bone density study 10/3/11    WNL    Hiatal hernia     Hypercholesterolemia     Internal hemorrhoid     Menopause     Mobility impaired     Obesity     [facility] 2018    per [employer]    Pedal edema     Stasis dermatitis        Past Surgical History:   Procedure Laterality Date    COLONOSCOPY N/A 2019    COLONOSCOPY performed by Jad Gordillo MD at [address] HX COLONOSCOPY  2/10    10 yr f/u, Dr. Chad Oh HX ENDOSCOPY  2019    EGD.      HX HEENT      dental problems       Family History   Problem Relation Age of Onset    Diabetes Mother     Diabetes Father        Social History     Socioeconomic History    Marital status: SINGLE     Spouse name: Not on file    Number of children: Not on file    Years of education: Not on file    Highest education level: Not on file   Occupational History    Occupation:    Tobacco Use    Smoking status: Former Smoker     Packs/day: 0.01     Years: 1.00     Pack years: 0.01     Types: Cigarettes     Start date:      Quit date:      Years since quittin.6    Smokeless tobacco: Never Used    Tobacco comment: quit    Vaping Use    Vaping Use: Never used   Substance and Sexual Activity    Alcohol use: No    Drug use: No    Sexual activity: Never     Social Determinants of Health     Financial Resource Strain:     Difficulty of Paying Living Expenses:    Food Insecurity:     Worried About Running Out of Food in the Last Year:     [address] in the Last Year:    Transportation Needs:     Lack of Transportation (Medical):  Lack of Transportation (Non-Medical):    Physical Activity:     Days of Exercise per Week:     Minutes of Exercise per Session:    Stress:     Feeling of Stress :    Social Connections:     Frequency of Communication with Friends and Family:     Frequency of Social Gatherings with Friends and Family:     Attends Faith Services:     Active Member of Clubs or Organizations:     Attends Club or Organization Meetings:     Marital Status:          Current Outpatient Medications:     furosemide (LASIX) 40 mg tablet, Take 1 Tablet by mouth daily. Indications: visible water retention, Disp: , Rfl:     hydrOXYzine HCL (ATARAX) 25 mg tablet, Take 1 Tablet by mouth three (3) times daily as needed for Itching. Indications: itching, Disp: 90 Tablet, Rfl: 5    gabapentin (NEURONTIN) 100 mg capsule, Take 1 Capsule by mouth two (2) times a day. Max Daily Amount: 200 mg. Indications: pruritis, Disp: 60 Capsule, Rfl: 1    FeroSuL 325 mg (65 mg iron) tablet, TAKE 1 TABLET BY MOUTH EVERY DAY BEFORE BREAKFAST, Disp: 30 Tablet, Rfl: 5    simvastatin (ZOCOR) 20 mg tablet, TAKE 1 TABLET BY MOUTH DAILY AT BEDTIME, Disp: 30 Tablet, Rfl: 3    cimetidine (TAGAMET) 400 mg tablet, TAKE 1 TABLET BY MOUTH TWO (2) TIMES A DAY., Disp: 60 Tablet, Rfl: 3    telmisartan-hydroCHLOROthiazide (MICARDIS HCT) 40-12.5 mg per tablet, TAKE 1 TABLET BY MOUTH DAILY. , Disp: 90 Tab, Rfl: 3    clobetasoL (TEMOVATE) 0.05 % ointment, APPLY TO LEGS TWICE DAILY AS NEEDED FOR 2 WEEKS THEN STOP FOR A WEEK AND REPEAT AS NEEDED, Disp: 30 g, Rfl: 4    azelastine (ASTELIN) 137 mcg (0.1 %) nasal spray, 1 [place] by Both Nostrils route two (2) times a day.  Use in each nostril as directed  Indications: Vasomotor Rhinitis, Disp: 1 Bottle, Rfl: 1   ammonium lactate (LAC-HYDRIN) 12 % lotion, Apply daily to legs after bathing, Disp: 400 mL, Rfl: 5    folic acid/multivit-min/lutein (CENTRUM SILVER PO), Take  by mouth., Disp: , Rfl:     VARICELLA-ZOSTER VIRUS INFECTION PROPHYLAXIS 19,400 unit/0.65 mL susr injection, , Disp: , Rfl:      No Known Allergies    Review of Systems   Constitutional: Negative for chills, fever and weight loss. HENT: Negative. Eyes: Positive for blurred vision. Respiratory: Negative for cough and shortness of breath. Cardiovascular: Negative for chest pain. Gastrointestinal: Negative. Genitourinary: Negative. Neurological: Negative for dizziness and loss of consciousness. Psychiatric/Behavioral: Negative for depression. The patient is not nervous/anxious. Physical Exam:      Visit Vitals  BP (!) 148/74 (BP 1 Location: Right upper arm, BP Patient Position: Sitting, BP Cuff Size: Large adult)   Pulse (!) 107   Temp 97.5 °F (36.4 °C) (Temporal)   Resp 20   Ht 5' 4\" (1.626 m)   Wt 226 lb (102.5 kg)   SpO2 97%   BMI 38.79 kg/m²       Physical Exam  Constitutional:       Appearance: Normal appearance. HENT:      Head: Normocephalic. Comments: Mild swelling  vertex  Cardiovascular:      Rate and Rhythm: Normal rate. Pulmonary:      Effort: Pulmonary effort is normal. No respiratory distress. Breath sounds: Normal breath sounds. No stridor. Skin:     General: Skin is warm and dry. Neurological:      General: No focal deficit present. Mental Status: She is alert and oriented to person, place, and time. Psychiatric:         Mood and Affect: Mood normal.         Behavior: Behavior normal.          Labs Reviewed:   8/13/21 0000 12/31/19 1352 8/16/19 1257 3/28/19 1031    Glucose 65 - 99 mg/dL 98  87 R  92  97    BUN 8 - 27 mg/dL 36High   28High  R  22  33High     Creatinine 0.57 - 1.00 mg/dL 2. 71High   2. 43High  R  1. 68High   1. 80High     GFR est non-AA >59 mL/min/1.73 17Low   20Low  R, CM  31Low 29Low     GFR est AA >59 mL/min/1.73 20Low   24Low  R  35Low   33Low     Comment: **[facility] currently reports eGFR in compliance with the          Assessment/Plan       ICD-10-CM ICD-9-CM    1. Contusion of scalp, initial encounter  S00. 03XA 920    2. Essential hypertension  I10 401.9    3. CKD (chronic kidney disease) stage 4, GFR 15-29 ml/min (HCC)  N18.4 585.4       reassured re #1. No Rx needed. BPstable. Nephrology appt.  upcoming        Lilly Longoria MD

## 2021-10-07 NOTE — PROGRESS NOTES
Rock Ayala is a 70 y.o. female (: 1950) presenting to address:    Chief Complaint   Patient presents with    Head Injury     patient stated she hit her head really hard on refrigerator two weeks again           patient had bad headache for two days          pain scale 0/10       Vitals:    10/07/21 1432   BP: (!) 148/74   Pulse: (!) 107   Resp: 20   Temp: 97.5 °F (36.4 °C)   TempSrc: Temporal   SpO2: 97%   Weight: 226 lb (102.5 kg)   Height: 5' 4\" (1.626 m)   PainSc:   0 - No pain       Hearing/Vision:   No exam data present    Learning Assessment:     Learning Assessment 2015   PRIMARY LEARNER Patient   HIGHEST LEVEL OF EDUCATION - PRIMARY LEARNER  DID NOT GRADUATE HIGH SCHOOL   BARRIERS PRIMARY LEARNER NONE   CO-LEARNER CAREGIVER No   PRIMARY LANGUAGE ENGLISH   LEARNER PREFERENCE PRIMARY DEMONSTRATION     LISTENING   ANSWERED BY patient   RELATIONSHIP SELF     Depression Screening:     3 most recent PHQ Screens 10/7/2021   Little interest or pleasure in doing things Not at all   Feeling down, depressed, irritable, or hopeless Not at all   Total Score PHQ 2 0     Fall Risk Assessment:     Fall Risk Assessment, last 12 mths 10/7/2021   Able to walk? Yes   Fall in past 12 months? 0   Do you feel unsteady? -   Are you worried about falling -   Number of falls in past 12 months -   Fall with injury? -     Abuse Screening:     Abuse Screening Questionnaire 2020   Do you ever feel afraid of your partner? N   Are you in a relationship with someone who physically or mentally threatens you? N   Is it safe for you to go home? Y     Coordination of Care Questionaire:   1. Have you been to the ER, urgent care clinic since your last visit? Hospitalized since your last visit? NO    2. Have you seen or consulted any other health care providers outside of the [address] [place] [address] since your last visit? Include any pap smears or colon screening. NO    Advanced Directive:   1.  Do you have an Advanced Directive? NO    2. Would you like information on Advanced Directives?  NO

## 2021-12-20 DIAGNOSIS — Z76.0 MEDICATION REFILL: ICD-10-CM

## 2021-12-20 RX ORDER — SIMVASTATIN 20 MG/1
TABLET, FILM COATED ORAL
Qty: 30 TABLET | Refills: 2 | Status: SHIPPED | OUTPATIENT
Start: 2021-12-20 | End: 2022-04-06

## 2021-12-21 RX ORDER — CIMETIDINE 400 MG/1
TABLET, FILM COATED ORAL
Qty: 60 TABLET | Refills: 2 | Status: SHIPPED | OUTPATIENT
Start: 2021-12-21 | End: 2022-04-06

## 2022-01-14 ENCOUNTER — OFFICE VISIT (OUTPATIENT)
Dept: INTERNAL MEDICINE CLINIC | Age: 72
End: 2022-01-14
Payer: MEDICARE

## 2022-01-14 ENCOUNTER — HOSPITAL ENCOUNTER (OUTPATIENT)
Dept: LAB | Age: 72
Discharge: HOME | End: 2022-01-14
Payer: MEDICARE

## 2022-01-14 VITALS
TEMPERATURE: 97.3 F | HEART RATE: 100 BPM | RESPIRATION RATE: 18 BRPM | SYSTOLIC BLOOD PRESSURE: 149 MMHG | WEIGHT: 225.7 LBS | DIASTOLIC BLOOD PRESSURE: 60 MMHG | OXYGEN SATURATION: 99 % | BODY MASS INDEX: 38.53 KG/M2 | HEIGHT: 64 IN

## 2022-01-14 DIAGNOSIS — M79.605 PAIN OF LEFT LOWER EXTREMITY: ICD-10-CM

## 2022-01-14 DIAGNOSIS — I73.9 PAD (PERIPHERAL ARTERY DISEASE) (HCC): ICD-10-CM

## 2022-01-14 DIAGNOSIS — Z00.00 MEDICARE ANNUAL WELLNESS VISIT, SUBSEQUENT: Primary | ICD-10-CM

## 2022-01-14 DIAGNOSIS — Z23 NEEDS FLU SHOT: ICD-10-CM

## 2022-01-14 DIAGNOSIS — M54.17 LUMBOSACRAL RADICULOPATHY: ICD-10-CM

## 2022-01-14 DIAGNOSIS — E66.01 SEVERE OBESITY (BMI 35.0-35.9 WITH COMORBIDITY) (HCC): ICD-10-CM

## 2022-01-14 DIAGNOSIS — I10 ESSENTIAL HYPERTENSION: ICD-10-CM

## 2022-01-14 DIAGNOSIS — N18.4 CKD (CHRONIC KIDNEY DISEASE) STAGE 4, GFR 15-29 ML/MIN (HCC): ICD-10-CM

## 2022-01-14 LAB
ALBUMIN SERPL-MCNC: 3.6 G/DL (ref 3.4–5)
ALBUMIN/GLOB SERPL: 0.9 {RATIO} (ref 0.8–1.7)
ALP SERPL-CCNC: 97 U/L (ref 45–117)
ALT SERPL-CCNC: 17 U/L (ref 13–56)
ANION GAP SERPL CALC-SCNC: 8 MMOL/L (ref 3–18)
AST SERPL-CCNC: 14 U/L (ref 10–38)
BILIRUB SERPL-MCNC: 0.3 MG/DL (ref 0.2–1)
BUN SERPL-MCNC: 34 MG/DL (ref 7–18)
BUN/CREAT SERPL: 14 (ref 12–20)
CALCIUM SERPL-MCNC: 9.2 MG/DL (ref 8.5–10.1)
CHLORIDE SERPL-SCNC: 107 MMOL/L (ref 100–111)
CHOLEST SERPL-MCNC: 167 MG/DL
CO2 SERPL-SCNC: 25 MMOL/L (ref 21–32)
CREAT SERPL-MCNC: 2.5 MG/DL (ref 0.6–1.3)
GLOBULIN SER CALC-MCNC: 4.1 G/DL (ref 2–4)
GLUCOSE SERPL-MCNC: 98 MG/DL (ref 74–99)
HDLC SERPL-MCNC: 59 MG/DL (ref 40–60)
HDLC SERPL: 2.8 {RATIO} (ref 0–5)
LDLC SERPL CALC-MCNC: 88.6 MG/DL (ref 0–100)
LIPID PROFILE,FLP: NORMAL
POTASSIUM SERPL-SCNC: 4.4 MMOL/L (ref 3.5–5.5)
PROT SERPL-MCNC: 7.7 G/DL (ref 6.4–8.2)
SODIUM SERPL-SCNC: 140 MMOL/L (ref 136–145)
TRIGL SERPL-MCNC: 97 MG/DL (ref ?–150)
VLDLC SERPL CALC-MCNC: 19.4 MG/DL

## 2022-01-14 PROCEDURE — G8399 PT W/DXA RESULTS DOCUMENT: HCPCS | Performed by: INTERNAL MEDICINE

## 2022-01-14 PROCEDURE — G0439 PPPS, SUBSEQ VISIT: HCPCS | Performed by: INTERNAL MEDICINE

## 2022-01-14 PROCEDURE — 3017F COLORECTAL CA SCREEN DOC REV: CPT | Performed by: INTERNAL MEDICINE

## 2022-01-14 PROCEDURE — G8417 CALC BMI ABV UP PARAM F/U: HCPCS | Performed by: INTERNAL MEDICINE

## 2022-01-14 PROCEDURE — 1090F PRES/ABSN URINE INCON ASSESS: CPT | Performed by: INTERNAL MEDICINE

## 2022-01-14 PROCEDURE — 80061 LIPID PANEL: CPT

## 2022-01-14 PROCEDURE — 90694 VACC AIIV4 NO PRSRV 0.5ML IM: CPT | Performed by: INTERNAL MEDICINE

## 2022-01-14 PROCEDURE — G8510 SCR DEP NEG, NO PLAN REQD: HCPCS | Performed by: INTERNAL MEDICINE

## 2022-01-14 PROCEDURE — G0008 ADMIN INFLUENZA VIRUS VAC: HCPCS | Performed by: INTERNAL MEDICINE

## 2022-01-14 PROCEDURE — G8754 DIAS BP LESS 90: HCPCS | Performed by: INTERNAL MEDICINE

## 2022-01-14 PROCEDURE — 80053 COMPREHEN METABOLIC PANEL: CPT

## 2022-01-14 PROCEDURE — G8427 DOCREV CUR MEDS BY ELIG CLIN: HCPCS | Performed by: INTERNAL MEDICINE

## 2022-01-14 PROCEDURE — 1101F PT FALLS ASSESS-DOCD LE1/YR: CPT | Performed by: INTERNAL MEDICINE

## 2022-01-14 PROCEDURE — G8536 NO DOC ELDER MAL SCRN: HCPCS | Performed by: INTERNAL MEDICINE

## 2022-01-14 PROCEDURE — G9899 SCRN MAM PERF RSLTS DOC: HCPCS | Performed by: INTERNAL MEDICINE

## 2022-01-14 PROCEDURE — 99214 OFFICE O/P EST MOD 30 MIN: CPT | Performed by: INTERNAL MEDICINE

## 2022-01-14 PROCEDURE — G8753 SYS BP > OR = 140: HCPCS | Performed by: INTERNAL MEDICINE

## 2022-01-14 RX ORDER — METHYLPREDNISOLONE 4 MG/1
TABLET ORAL
Qty: 1 DOSE PACK | Refills: 0 | Status: SHIPPED | OUTPATIENT
Start: 2022-01-14 | End: 2022-05-12 | Stop reason: ALTCHOICE

## 2022-01-14 NOTE — PROGRESS NOTES
Reviewed record in preparation for visit and have obtained necessary documentation. Kathi Torres is a 70 y.o.  female presents today for office visit for   Chief Complaint   Patient presents with    Leg Pain    Annual Wellness Visit   . Pt is not fasting. Patient is accompanied by granddaughter. I have received verbal consent from Kathi Torres to discuss any/all medical information while they are present in the room. Pt is in Room # 5    Cyn Perry preferred language for health care discussion is english/other. Is the patient using any DME equipment during OV? YES    Advance Directive:  1. Do you have an advance directive in place? Patient Reply: NO    2. If not, would you like material regarding how to put one in place? NO      1. \"Have you been to the ER, urgent care clinic since your last visit? Hospitalized since your last visit? \" No    2. \"Have you seen or consulted any other health care providers outside of the [address] [place] [address] since your last visit? \" No     3. For patients aged 39-70: Has the patient had a colonoscopy? Yes, HM satisfied with blue hyperlink     If the patient is female:    4. For patients aged 41-77: Has the patient had a mammogram within the past 2 years? Yes, HM satisfied with blue hyperlink    5. For patients aged 21-65: Has the patient had a pap smear? NA based on age or sex    Upcoming Appts  Yes Nephrology and Dermatology    VORB:   Orders Placed This Encounter    Influenza Vaccine, QUAD, 65 Yrs +  IM  (Fluad 70544 )   Jose Del Toro MD/GENNY Bravos is due for:   Health Maintenance Due   Topic    Flu Vaccine (1)    DTaP/Tdap/Td series (2 - Td or Tdap)    Medicare Yearly Exam      Health Maintenance reviewed and discussed per provider  Please order/place referral if appropriate.     Requested Prescriptions      No prescriptions requested or ordered in this encounter       Learning Assessment:  Learning Assessment 4/13/2015   PRIMARY LEARNER Patient   HIGHEST LEVEL OF EDUCATION - PRIMARY LEARNER  DID NOT GRADUATE HIGH SCHOOL   BARRIERS PRIMARY LEARNER NONE   CO-LEARNER CAREGIVER No   PRIMARY LANGUAGE ENGLISH   LEARNER PREFERENCE PRIMARY DEMONSTRATION     LISTENING   ANSWERED BY patient   RELATIONSHIP SELF     Depression Screening:  3 most recent [facility] Screens 1/14/2022 10/7/2021 8/13/2021 11/5/2020 3/28/2019 7/3/2018 6/4/2018   Little interest or pleasure in doing things Not at all Not at all Not at all Not at all Not at all Not at all Not at all   Feeling down, depressed, irritable, or hopeless Not at all Not at all Not at all Not at all Not at all Not at all Not at all   Total Score PHQ 2 0 0 0 0 0 0 0     Abuse Screening:  Abuse Screening Questionnaire 1/14/2022 11/5/2020 8/16/2019 7/3/2018 5/11/2017 4/13/2015   Do you ever feel afraid of your partner? N N N N N N   Are you in a relationship with someone who physically or mentally threatens you? N N N N N N   Is it safe for you to go home? Sheyla CRESPO     Fall Risk  Fall Risk Assessment, last 12 mths 1/14/2022 10/7/2021 8/13/2021 11/5/2020 3/28/2019 3/28/2019 7/3/2018   Able to walk? Yes Yes Yes Yes - Yes Yes   Fall in past 12 months? 0 0 0 No Yes Yes No   Do you feel unsteady? 0 - 0 - - - -   Are you worried about falling 0 - 0 - - - -   Number of falls in past 12 months - - - - 1 1 -   Fall with injury? - - - - 0 0 -     Recent Travel Screening and Travel History documentation     Travel Screening     Question   Response    In the last month, have you been in contact with someone who was confirmed or suspected to have Coronavirus / COVID-19? No / Unsure    Have you had a COVID-19 viral test in the last 14 days? No    Do you have any of the following new or worsening symptoms? None of these    Have you traveled internationally or domestically in the last month?   No      Travel History   Travel since 12/14/21    No documented travel since 12/14/21         Noel Ellington is a 70 y.o. female who presents for Fluad immunization. she denies any symptoms , reactions or allergies that would exclude them from being immunized today. Risks and adverse reactions were discussed and the VIS was given to them. All questions were addressed. she was observed for 15 min post injection. There were no reactions observed.     Ashu Woodard LPN

## 2022-01-14 NOTE — PROGRESS NOTES
HISTORY OF PRESENT ILLNESS  Cyn Perry is a 70 y.o. female. BP (!) 149/60 (BP 1 Location: Left upper arm, BP Patient Position: Sitting, BP Cuff Size: Large adult)   Pulse 100   Temp 97.3 °F (36.3 °C) (Temporal)   Resp 18   Ht 5' 4\" (1.626 m)   Wt 225 lb 11.2 oz (102.4 kg)   SpO2 99%   BMI 38.74 kg/m²     Leg Pain   The history is provided by the patient (was standing several hours at [employer] on New Year's Day. Afterward had onset left lower back pain and pain down the leg). This is a new problem. The current episode started more than 1 week ago. The problem occurs daily. The problem has been gradually improving. Associated symptoms include back pain. Pertinent negatives include no tingling. Hypertension   The history is provided by the patient. This is a chronic problem. The current episode started more than 1 week ago. The problem has not changed since onset. Pertinent negatives include no chest pain, no palpitations and no shortness of breath. Risk factors include obesity and a sedentary lifestyle. Review of Systems   Constitutional: Negative for chills and fever. Respiratory: Negative for shortness of breath. Cardiovascular: Negative for chest pain and palpitations. Gastrointestinal:        No change in bowels   Genitourinary:        No change in voiding   Musculoskeletal: Positive for back pain and joint pain. Neurological: Positive for focal weakness. Negative for tingling. Physical Exam  Vitals and nursing note reviewed. Constitutional:       Appearance: Normal appearance. She is well-developed. HENT:      Head: Normocephalic and atraumatic. Right Ear: There is impacted cerumen. Left Ear: There is no impacted cerumen. Cardiovascular:      Rate and Rhythm: Normal rate and regular rhythm. Pulmonary:      Effort: Pulmonary effort is normal.      Breath sounds: Normal breath sounds. Musculoskeletal:      Right lower leg: Edema present.       Left lower leg: Edema present. Comments: Calf not tender on left leg  Thigh not tender. She does have difficulty lifting left leg. Hip not tender  Some discomfort lower left back on palpation   Skin:     General: Skin is warm and dry. Neurological:      General: No focal deficit present. Mental Status: She is alert and oriented to person, place, and time. Psychiatric:         Mood and Affect: Mood normal.         Behavior: Behavior normal.         ASSESSMENT and PLAN    ICD-10-CM ICD-9-CM                  3. Essential hypertension  K74 443.8 METABOLIC PANEL, COMPREHENSIVE      LIPID PANEL   4. Pain of left lower extremity  M79.605 729.5 methylPREDNISolone (MEDROL DOSEPACK) 4 mg tablet   5. CKD (chronic kidney disease) stage 4, GFR 15-29 ml/min ([facility])  N18.4 585.4    6. PAD (peripheral artery disease) ([facility])  I73.9 443.9    7. Severe obesity (BMI 35.0-35.9 with comorbidity) ([facility])  E66.01 278.01     Z68.35 V85.35    8. Lumbosacral radiculopathy  M54.17 724.4 methylPREDNISolone (MEDROL DOSEPACK) 4 mg tablet       BP up some. May be related to leg pain    Leg weakness. Probable pinched nerve in her back. Already improving some.  No red flags  Will pulse with medrol dose pack  Continue other topical and soaks    F/u 2 weeks for recheck on leg    Otherwise, 3 months for BP and cholesterol

## 2022-01-14 NOTE — PROGRESS NOTES
This is the Subsequent Medicare Annual Wellness Exam, performed 12 months or more after the Initial AWV or the last Subsequent AWV    I have reviewed the patient's medical history in detail and updated the computerized patient record. Assessment/Plan   Education and counseling provided:  Are appropriate based on today's review and evaluation    1. Medicare annual wellness visit, subsequent  2. Needs flu shot  -     FLU (FLUAD QUAD INFLUENZA VACCINE,QUAD,ADJUVANTED)      Depression Risk Factor Screening     3 most recent PHQ Screens 1/14/2022   Little interest or pleasure in doing things Not at all   Feeling down, depressed, irritable, or hopeless Not at all   Total Score PHQ 2 0       Alcohol & Drug Abuse Risk Screen    Do you average more than 1 drink per night or more than 7 drinks a week:  No    On any one occasion in the past three months have you have had more than 3 drinks containing alcohol:  No          Functional Ability and Level of Safety    Hearing: Hearing is good. pt is unclear      Activities of Daily Living: The home contains: grab bars and raised toilet seat  Patient needs help with:  transportation and walking      Ambulation: with difficulty, uses a walker     Fall Risk:  Fall Risk Assessment, last 12 mths 1/14/2022   Able to walk? Yes   Fall in past 12 months? 0   Do you feel unsteady? 0   Are you worried about falling 0   Number of falls in past 12 months -   Fall with injury?  -      Abuse Screen:  Patient is not abused       Cognitive Screening    Has your family/caregiver stated any concerns about your memory: no     Cognitive Screening: Normal - Animal Naming Test   Named 3 objects and their uses    Health Maintenance Due     Health Maintenance Due   Topic Date Due    Flu Vaccine (1) 09/01/2021    DTaP/Tdap/Td series (2 - Td or Tdap) 09/28/2021       Patient Care Team   Patient Care Team:  Eva Su MD as PCP - General (Internal Medicine)  Eva Su MD as PCP - [facility] Provider  Marcie Guevara MD as Consulting Provider (Gastroenterology)  Tamar Shea MD as Consulting Provider (Ophthalmology)  Reynaldo Gil MD (Nephrology)    History     Patient Active Problem List   Diagnosis Code    Hypercholesterolemia E78.00    Essential hypertension I10    CRI (chronic renal insufficiency) N18.9    Obesity, morbid (HCC) E66.01    CKD (chronic kidney disease) stage 3, GFR 30-59 ml/min ([facility]) N18.30    Stasis dermatitis I87.2    CKD (chronic kidney disease) stage 4, GFR 15-29 ml/min ([facility] N18.4     Past Medical History:   Diagnosis Date    Anemia NEC     Atrophic vaginitis     CKD (chronic kidney disease) stage 3, GFR 30-59 ml/min ([facility]     COVID-19 01/22/2021    Elephantiasis nostra verrucosa 05/21/2018    H/O bone density study 10/3/11    WNL    Hiatal hernia     Hypercholesterolemia     Internal hemorrhoid     Menopause     Mobility impaired     Obesity     PAD (peripheral artery disease) ([facility] 03/26/2018    per [employer]    Pedal edema     Stasis dermatitis       Past Surgical History:   Procedure Laterality Date    COLONOSCOPY N/A 7/16/2019    COLONOSCOPY performed by Marcie Guevara MD at [facility] HX COLONOSCOPY  2/10    10 yr f/u, Dr. Maame Zaragoza HX ENDOSCOPY  07/16/2019    EGD.  HX HEENT      dental problems     Current Outpatient Medications   Medication Sig Dispense Refill    cimetidine (TAGAMET) 400 mg tablet TAKE 1 TABLET BY MOUTH TWO (2) TIMES A DAY. 60 Tablet 2    simvastatin (ZOCOR) 20 mg tablet TAKE 1 TABLET BY MOUTH DAILY AT BEDTIME 30 Tablet 2    furosemide (LASIX) 40 mg tablet Take 1 Tablet by mouth daily. Indications: visible water retention      hydrOXYzine HCL (ATARAX) 25 mg tablet Take 1 Tablet by mouth three (3) times daily as needed for Itching. Indications: itching 90 Tablet 5    gabapentin (NEURONTIN) 100 mg capsule Take 1 Capsule by mouth two (2) times a day. Max Daily Amount: 200 mg.  Indications: pruritis 60 Capsule 1    FeroSuL 325 mg (65 mg iron) tablet TAKE 1 TABLET BY MOUTH EVERY DAY BEFORE BREAKFAST 30 Tablet 5    telmisartan-hydroCHLOROthiazide (MICARDIS HCT) 40-12.5 mg per tablet TAKE 1 TABLET BY MOUTH DAILY. 90 Tab 3    clobetasoL (TEMOVATE) 0.05 % ointment APPLY TO LEGS TWICE DAILY AS NEEDED FOR 2 WEEKS THEN STOP FOR A WEEK AND REPEAT AS NEEDED 30 g 4    azelastine (ASTELIN) 137 mcg (0.1 %) nasal spray 1 [place] by Both Nostrils route two (2) times a day. Use in each nostril as directed  Indications: Vasomotor Rhinitis 1 Bottle 1    ammonium lactate (LAC-HYDRIN) 12 % lotion Apply daily to legs after bathing 965 mL 5    folic acid/multivit-min/lutein (CENTRUM SILVER PO) Take  by mouth.        No Known Allergies    Family History   Problem Relation Age of Onset    Diabetes Mother     Diabetes Father      Social History     Tobacco Use    Smoking status: Former Smoker     Packs/day: 0.01     Years: 1.00     Pack years: 0.01     Types: Cigarettes     Start date:      Quit date:      Years since quittin.0    Smokeless tobacco: Never Used    Tobacco comment: quit    Substance Use Topics    Alcohol use: No         Sukhdev Michel MD

## 2022-01-14 NOTE — PATIENT INSTRUCTIONS
Vaccine Information Statement    Influenza (Flu) Vaccine (Inactivated or Recombinant): What You Need to Know    Many vaccine information statements are available in Kiswahili and other languages. See www.immunize.org/vis. Hojas de información sobre vacunas están disponibles en español y en muchos otros idiomas. Visite www.immunize.org/vis. 1. Why get vaccinated? Influenza vaccine can prevent influenza (flu). Flu is a contagious disease that spreads around the [facility] every year, usually between October and May. Anyone can get the flu, but it is more dangerous for some people. Infants and young children, people 72 years and older, pregnant people, and people with certain health conditions or a weakened immune system are at greatest risk of flu complications. Pneumonia, bronchitis, sinus infections, and ear infections are examples of flu-related complications. If you have a medical condition, such as heart disease, cancer, or diabetes, flu can make it worse. Flu can cause fever and chills, sore throat, muscle aches, fatigue, cough, headache, and runny or stuffy nose. Some people may have vomiting and diarrhea, though this is more common in children than adults. In an average year, thousands of people in the [facility] die from flu, and many more are hospitalized. Flu vaccine prevents millions of illnesses and flu-related visits to the doctor each year. 2. Influenza vaccines     CDC recommends everyone 6 months and older get vaccinated every flu season. Children 6 months through 6years of age may need 2 doses during a single flu season. Everyone else needs only 1 dose each flu season. It takes about 2 weeks for protection to develop after vaccination. There are many flu viruses, and they are always changing. Each year a new flu vaccine is made to protect against the influenza viruses believed to be likely to cause disease in the upcoming flu season.  Even when the vaccine doesnt exactly match these viruses, it may still provide some protection. Influenza vaccine does not cause flu. Influenza vaccine may be given at the same time as other vaccines. 3. Talk with your health care provider    Tell your vaccination provider if the person getting the vaccine:   Has had an allergic reaction after a previous dose of influenza vaccine, or has any severe, life-threatening allergies    Has ever had Guillain-Barré Syndrome (also called GBS)    In some cases, your health care provider may decide to postpone influenza vaccination until a future visit. Influenza vaccine can be administered at any time during pregnancy. People who are or will be pregnant during influenza season should receive inactivated influenza vaccine. People with minor illnesses, such as a cold, may be vaccinated. People who are moderately or severely ill should usually wait until they recover before getting influenza vaccine. Your health care provider can give you more information. 4. Risks of a vaccine reaction     Soreness, redness, and swelling where the shot is given, fever, muscle aches, and headache can happen after influenza vaccination.  There may be a very small increased risk of Guillain-Barré Syndrome (GBS) after inactivated influenza vaccine (the flu shot). [facility] children who get the flu shot along with pneumococcal vaccine (PCV13) and/or DTaP vaccine at the same time might be slightly more likely to have a seizure caused by fever. Tell your health care provider if a child who is getting flu vaccine has ever had a seizure. People sometimes faint after medical procedures, including vaccination. Tell your provider if you feel dizzy or have vision changes or ringing in the ears. As with any medicine, there is a very remote chance of a vaccine causing a severe allergic reaction, other serious injury, or death. 5. What if there is a serious problem?     An allergic reaction could occur after the vaccinated person leaves the clinic. If you see signs of a severe allergic reaction (hives, swelling of the face and throat, difficulty breathing, a fast heartbeat, dizziness, or weakness), call 9-1-1 and get the person to the nearest hospital.    For other signs that concern you, call your health care provider. Adverse reactions should be reported to the Vaccine Adverse Event Reporting System (VAERS). Your health care provider will usually file this report, or you can do it yourself. Visit the VAERS website at www.vaers. [facility].gov or call 0-564.326.8235. VAERS is only for reporting reactions, and VAERS staff members do not give medical advice. 6. The National Vaccine Injury Compensation Program    The [facility] Vaccine Injury Compensation Program (VICP) is a federal program that was created to compensate people who may have been injured by certain vaccines. Claims regarding alleged injury or death due to vaccination have a time limit for filing, which may be as short as two years. Visit the VICP website at www.[facility].gov/vaccinecompensation or call 6-637.213.6375 to learn about the program and about filing a claim. 7. How can I learn more?  Ask your health care provider.  Call your local or state health department.  Visit the website of the Food and Drug Administration (FDA) for vaccine package inserts and additional information at www.fda.gov/vaccines-blood-biologics/vaccines.  Contact the Centers for Disease Control and Prevention (CDC):  - Call 6-532.420.1426 (9-749-IGD-INFO) or  - Visit CDCs influenza website at www.cdc.gov/flu. Vaccine Information Statement   Inactivated Influenza Vaccine   8/6/2021  [facility] [id number]   Department of Health and Human Services  Centers for Disease Control and Prevention    Office Use Only      Medicare Wellness Visit, Female     The best way to live healthy is to have a lifestyle where you eat a well-balanced diet, exercise regularly, limit alcohol use, and quit all forms of tobacco/nicotine, if applicable. Regular preventive services are another way to keep healthy. Preventive services (vaccines, screening tests, monitoring & exams) can help personalize your care plan, which helps you manage your own care. Screening tests can find health problems at the earliest stages, when they are easiest to treat. [facility] follows the current, evidence-based guidelines published by the [facility] Ras[facility] Sonia ([facility]) when recommending preventive services for our patients. Because we follow these guidelines, sometimes recommendations change over time as research supports it. (For example, mammograms used to be recommended annually. Even though Medicare will still pay for an annual mammogram, the newer guidelines recommend a mammogram every two years for women of average risk). Of course, you and your doctor may decide to screen more often for some diseases, based on your risk and your co-morbidities (chronic disease you are already diagnosed with). Preventive services for you include:  - Medicare offers their members a free annual wellness visit, which is time for you and your primary care provider to discuss and plan for your preventive service needs. Take advantage of this benefit every year!  -All adults over the age of 72 should receive the recommended pneumonia vaccines. Current USPSTF guidelines recommend a series of two vaccines for the best pneumonia protection.   -All adults should have a flu vaccine yearly and a tetanus vaccine every 10 years.   -All adults age 48 and older should receive the shingles vaccines (series of two vaccines).       -All adults age 38-68 who are overweight should have a diabetes screening test once every three years.   -All adults born between [birth year] and 1965 should be screened once for Hepatitis C.  -Other screening tests and preventive services for persons with diabetes include: an eye exam to screen for diabetic retinopathy, a kidney function test, a foot exam, and stricter control over your cholesterol.   -Cardiovascular screening for adults with routine risk involves an electrocardiogram (ECG) at intervals determined by your doctor.   -Colorectal cancer screenings should be done for adults age 54-65 with no increased risk factors for colorectal cancer. There are a number of acceptable methods of screening for this type of cancer. Each test has its own benefits and drawbacks. Discuss with your doctor what is most appropriate for you during your annual wellness visit. The different tests include: colonoscopy (considered the best screening method), a fecal occult blood test, a fecal DNA test, and sigmoidoscopy.    -A bone mass density test is recommended when a woman turns 65 to screen for osteoporosis. This test is only recommended one time, as a screening. Some providers will use this same test as a disease monitoring tool if you already have osteoporosis. -Breast cancer screenings are recommended every other year for women of normal risk, age 54-69.  -Cervical cancer screenings for women over age 72 are only recommended with certain risk factors.      Here is a list of your current Health Maintenance items (your personalized list of preventive services) with a due date:  Health Maintenance Due   Topic Date Due    Yearly Flu Vaccine (1) 09/01/2021    DTaP/Tdap/Td  (2 - Td or Tdap) 09/28/2021

## 2022-01-18 RX ORDER — FERROUS SULFATE TAB 325 MG (65 MG ELEMENTAL FE) 325 (65 FE) MG
TAB ORAL
Qty: 30 TABLET | Refills: 4 | Status: SHIPPED | OUTPATIENT
Start: 2022-01-18 | End: 2022-06-22

## 2022-03-07 DIAGNOSIS — Z76.0 MEDICATION REFILL: ICD-10-CM

## 2022-03-07 RX ORDER — TELMISARTAN AND HYDROCHLORTHIAZIDE 40; 12.5 MG/1; MG/1
TABLET ORAL
Qty: 90 TABLET | Refills: 2 | Status: SHIPPED | OUTPATIENT
Start: 2022-03-07

## 2022-03-19 PROBLEM — N18.4 CKD (CHRONIC KIDNEY DISEASE) STAGE 4, GFR 15-29 ML/MIN (HCC): Status: ACTIVE | Noted: 2020-11-05

## 2022-03-19 PROBLEM — E66.01 OBESITY, MORBID (HCC): Status: ACTIVE | Noted: 2018-04-17

## 2022-04-12 ENCOUNTER — HOSPITAL ENCOUNTER (OUTPATIENT)
Dept: WOMENS IMAGING | Age: 72
Discharge: HOME | End: 2022-04-12
Attending: INTERNAL MEDICINE
Payer: MEDICARE

## 2022-04-12 DIAGNOSIS — Z12.31 ENCOUNTER FOR SCREENING MAMMOGRAM FOR BREAST CANCER: ICD-10-CM

## 2022-04-12 PROCEDURE — 77063 BREAST TOMOSYNTHESIS BI: CPT

## 2022-05-12 ENCOUNTER — OFFICE VISIT (OUTPATIENT)
Dept: INTERNAL MEDICINE CLINIC | Age: 72
End: 2022-05-12
Payer: MEDICARE

## 2022-05-12 VITALS
RESPIRATION RATE: 18 BRPM | HEIGHT: 64 IN | TEMPERATURE: 96.8 F | BODY MASS INDEX: 38.76 KG/M2 | WEIGHT: 227 LBS | DIASTOLIC BLOOD PRESSURE: 60 MMHG | HEART RATE: 98 BPM | OXYGEN SATURATION: 100 % | SYSTOLIC BLOOD PRESSURE: 146 MMHG

## 2022-05-12 DIAGNOSIS — I10 ESSENTIAL HYPERTENSION: Primary | ICD-10-CM

## 2022-05-12 DIAGNOSIS — E66.01 SEVERE OBESITY (BMI 35.0-35.9 WITH COMORBIDITY) (HCC): ICD-10-CM

## 2022-05-12 DIAGNOSIS — N18.4 CKD (CHRONIC KIDNEY DISEASE) STAGE 4, GFR 15-29 ML/MIN (HCC): ICD-10-CM

## 2022-05-12 DIAGNOSIS — E78.00 HYPERCHOLESTEROLEMIA: ICD-10-CM

## 2022-05-12 PROCEDURE — G8536 NO DOC ELDER MAL SCRN: HCPCS | Performed by: INTERNAL MEDICINE

## 2022-05-12 PROCEDURE — G9899 SCRN MAM PERF RSLTS DOC: HCPCS | Performed by: INTERNAL MEDICINE

## 2022-05-12 PROCEDURE — G8417 CALC BMI ABV UP PARAM F/U: HCPCS | Performed by: INTERNAL MEDICINE

## 2022-05-12 PROCEDURE — G8399 PT W/DXA RESULTS DOCUMENT: HCPCS | Performed by: INTERNAL MEDICINE

## 2022-05-12 PROCEDURE — 3017F COLORECTAL CA SCREEN DOC REV: CPT | Performed by: INTERNAL MEDICINE

## 2022-05-12 PROCEDURE — 1101F PT FALLS ASSESS-DOCD LE1/YR: CPT | Performed by: INTERNAL MEDICINE

## 2022-05-12 PROCEDURE — G8427 DOCREV CUR MEDS BY ELIG CLIN: HCPCS | Performed by: INTERNAL MEDICINE

## 2022-05-12 PROCEDURE — 1090F PRES/ABSN URINE INCON ASSESS: CPT | Performed by: INTERNAL MEDICINE

## 2022-05-12 PROCEDURE — 99214 OFFICE O/P EST MOD 30 MIN: CPT | Performed by: INTERNAL MEDICINE

## 2022-05-12 PROCEDURE — G8753 SYS BP > OR = 140: HCPCS | Performed by: INTERNAL MEDICINE

## 2022-05-12 PROCEDURE — G8510 SCR DEP NEG, NO PLAN REQD: HCPCS | Performed by: INTERNAL MEDICINE

## 2022-05-12 PROCEDURE — G8754 DIAS BP LESS 90: HCPCS | Performed by: INTERNAL MEDICINE

## 2022-05-12 NOTE — PROGRESS NOTES
HISTORY OF PRESENT ILLNESS  Cyn Perry is a 67 y.o. female. BP (!) 146/60 (BP 1 Location: Left upper arm, BP Patient Position: Sitting, BP Cuff Size: Large adult)   Pulse 98   Temp 96.8 °F (36 °C) (Temporal)   Resp 18   Ht 5' 4\" (1.626 m)   Wt 227 lb (103 kg)   SpO2 100%   BMI 38.96 kg/m²     Cholesterol Problem  The history is provided by the patient. This is a chronic problem. The current episode started more than 1 week ago. The problem occurs daily. Pertinent negatives include no chest pain, no headaches and no shortness of breath. Blood Pressure Check  The history is provided by the patient. This is a new problem. Pertinent negatives include no chest pain, no headaches and no shortness of breath. Review of Systems   Constitutional: Negative for chills and fever. Respiratory: Negative for shortness of breath. Cardiovascular: Positive for leg swelling. Negative for chest pain and palpitations. Skin:        Thick skin--sees dermatology   Neurological: Negative for dizziness and headaches. Physical Exam  Vitals and nursing note reviewed. Constitutional:       Appearance: Normal appearance. She is well-developed. HENT:      Head: Normocephalic and atraumatic. Cardiovascular:      Rate and Rhythm: Normal rate and regular rhythm. Pulmonary:      Effort: Pulmonary effort is normal.      Breath sounds: Normal breath sounds. Musculoskeletal:      Right lower leg: Edema present. Left lower leg: Edema present. Skin:     General: Skin is warm and dry. Neurological:      General: No focal deficit present. Mental Status: She is alert and oriented to person, place, and time. Psychiatric:         Mood and Affect: Mood normal.         Behavior: Behavior normal.         ASSESSMENT and PLAN    ICD-10-CM ICD-9-CM    1.  Essential hypertension  A02 592.0 METABOLIC PANEL, COMPREHENSIVE      LIPID PANEL   2. CKD (chronic kidney disease) stage 4, GFR 15-29 ml/min ([facility])  N18.4 585.4 URINALYSIS W/ RFLX MICROSCOPIC      PTH INTACT      MICROALBUMIN, UR, RAND W/ MICROALB/CREAT RATIO      MAGNESIUM   3. Hypercholesterolemia  E78.00 272.0 LIPID PANEL   4. Severe obesity (BMI 35.0-35.9 with comorbidity) ([facility])  E66.01 278.01     Z68.35 V85.35        BP controlled    Update lab today    Has not seen nephrology \"in a while. They have not called me. \"    F/u 4 months for HTN, chol, kidney recheck

## 2022-05-12 NOTE — PROGRESS NOTES
1. \"Have you been to the ER, urgent care clinic since your last visit? Hospitalized since your last visit? \" No    2. \"Have you seen or consulted any other health care providers outside of the [address] [place] [address] since your last visit? \" No     3. For patients aged 39-70: Has the patient had a colonoscopy / FIT/ Cologuard? Yes - no Care Gap present      If the patient is female:    4. For patients aged 41-77: Has the patient had a mammogram within the past 2 years? Yes - no Care Gap present      5. For patients aged 21-65: Has the patient had a pap smear?  NA - based on age or sex

## 2022-05-13 ENCOUNTER — DOCUMENTATION ONLY (OUTPATIENT)
Dept: INTERNAL MEDICINE CLINIC | Age: 72
End: 2022-05-13

## 2022-05-13 LAB
ALBUMIN SERPL-MCNC: 4.5 G/DL (ref 3.7–4.7)
ALBUMIN/CREAT UR: <5 MG/G CREAT (ref 0–29)
ALBUMIN/GLOB SERPL: 1.4 {RATIO} (ref 1.2–2.2)
ALP SERPL-CCNC: 94 IU/L (ref 44–121)
ALT SERPL-CCNC: 9 IU/L (ref 0–32)
APPEARANCE UR: CLEAR
AST SERPL-CCNC: 12 IU/L (ref 0–40)
BILIRUB SERPL-MCNC: 0.2 MG/DL (ref 0–1.2)
BILIRUB UR QL STRIP: NEGATIVE
BUN SERPL-MCNC: 27 MG/DL (ref 8–27)
BUN/CREAT SERPL: 11 (ref 12–28)
CALCIUM SERPL-MCNC: 9.2 MG/DL (ref 8.7–10.3)
CHLORIDE SERPL-SCNC: 105 MMOL/L (ref 96–106)
CHOLEST SERPL-MCNC: 196 MG/DL (ref 100–199)
CO2 SERPL-SCNC: 21 MMOL/L (ref 20–29)
COLOR UR: YELLOW
CREAT SERPL-MCNC: 2.54 MG/DL (ref 0.57–1)
CREAT UR-MCNC: 61.2 MG/DL
EGFR: 20 ML/MIN/1.73
GLOBULIN SER CALC-MCNC: 3.2 G/DL (ref 1.5–4.5)
GLUCOSE SERPL-MCNC: 88 MG/DL (ref 65–99)
GLUCOSE UR QL STRIP: NEGATIVE
HDLC SERPL-MCNC: 62 MG/DL
HGB UR QL STRIP: NEGATIVE
IMP & REVIEW OF LAB RESULTS: NORMAL
KETONES UR QL STRIP: NEGATIVE
LDLC SERPL CALC-MCNC: 117 MG/DL (ref 0–99)
LEUKOCYTE ESTERASE UR QL STRIP: NEGATIVE
MAGNESIUM SERPL-MCNC: 2.4 MG/DL (ref 1.6–2.3)
MICRO URNS: NORMAL
MICROALBUMIN UR-MCNC: <3 UG/ML
NITRITE UR QL STRIP: NEGATIVE
PH UR STRIP: 6.5 [PH] (ref 5–7.5)
POTASSIUM SERPL-SCNC: 4.9 MMOL/L (ref 3.5–5.2)
PROT SERPL-MCNC: 7.7 G/DL (ref 6–8.5)
PROT UR QL STRIP: NEGATIVE
PTH-INTACT SERPL-MCNC: 63 PG/ML (ref 15–65)
SODIUM SERPL-SCNC: 141 MMOL/L (ref 134–144)
SP GR UR STRIP: 1.01 (ref 1–1.03)
TRIGL SERPL-MCNC: 93 MG/DL (ref 0–149)
UROBILINOGEN UR STRIP-MCNC: 0.2 MG/DL (ref 0.2–1)
VLDLC SERPL CALC-MCNC: 17 MG/DL (ref 5–40)

## 2022-06-10 LAB — CREATININE, EXTERNAL: 2.4

## 2022-06-22 RX ORDER — FERROUS SULFATE TAB 325 MG (65 MG ELEMENTAL FE) 325 (65 FE) MG
TAB ORAL
Qty: 30 TABLET | Refills: 3 | Status: SHIPPED | OUTPATIENT
Start: 2022-06-22 | End: 2022-10-17

## 2022-08-26 ENCOUNTER — PATIENT OUTREACH (OUTPATIENT)
Dept: CASE MANAGEMENT | Age: 72
End: 2022-08-26

## 2022-08-26 NOTE — PROGRESS NOTES
Complex Case Management      Date/Time:  2022 4:41 PM    Method of communication with patient:phone    Ambulator Care Manager (ACM) contacted the patient by telephone to perform Ambulatory Care Coordination. Verified name and  (PHI) with patient as identifiers. Provided introduction to self, and explanation of the Ambulatory Care Manager's role. Reviewed most recent clinic visit w/ patient who verbalized understanding. Patient given an opportunity to ask questions. Patient presented to [facility]  ED on 22 and was diagnosed with suspected soft tissue infection of right lower leg. Top Challenges reviewed with the Provider   NA       The patient agrees to contact the PCP office or the [address] [place] [address] for questions related to their healthcare. Provided contact information for future reference. Waiting for return call from PCP office about appointment. Reviewed discharge instructions and red flags with  patient who voiced understanding. Patient given an opportunity to ask questions and does not have any further questions or concerns at this time. Disease Specific:   N/A    Home Health Active: No    DME Active: No    Barriers to care? Utilization of services    Advance Care Planning:   Does patient have an Advance Directive:  not on file; education provided ACP referral placed    Medication(s):   Medication reconciliation was performed with patient, who verbalizes understanding of administration of home medications. There were no barriers to obtaining medications identified at this time. Anu Vazquez is waiting for pharmacy to deliver Doxicycline. Referral to Pharm D needed: no     Current Outpatient Medications   Medication Sig    beta-carotene,A,-vits C,E/mins (OCUVITE PO) Take  by mouth.     FeroSuL 325 mg (65 mg iron) tablet TAKE 1 TABLET BY MOUTH EVERY DAY BEFORE BREAKFAST    simvastatin (ZOCOR) 20 mg tablet TAKE 1 TABLET BY MOUTH DAILY AT BEDTIME cimetidine (TAGAMET) 400 mg tablet TAKE 1 TABLET BY MOUTH TWO (2) TIMES A DAY. telmisartan-hydroCHLOROthiazide (MICARDIS HCT) 40-12.5 mg per tablet TAKE 1 TABLET BY MOUTH DAILY. furosemide (LASIX) 40 mg tablet Take 1 Tablet by mouth daily. Indications: visible water retention    ammonium lactate (LAC-HYDRIN) 12 % lotion Apply daily to legs after bathing    doxycycline (VIBRA-TABS) 100 mg tablet Take 1 Tablet by mouth two (2) times a day for 7 days. (Patient not taking: Reported on 8/26/2022)    hydrOXYzine HCL (ATARAX) 25 mg tablet Take 1 Tablet by mouth three (3) times daily as needed for Itching. Indications: itching (Patient not taking: Reported on 8/26/2022)    gabapentin (NEURONTIN) 100 mg capsule Take 1 Capsule by mouth two (2) times a day. Max Daily Amount: 200 mg. Indications: pruritis (Patient not taking: Reported on 8/26/2022)    clobetasoL (TEMOVATE) 0.05 % ointment APPLY TO LEGS TWICE DAILY AS NEEDED FOR 2 WEEKS THEN STOP FOR A WEEK AND REPEAT AS NEEDED (Patient not taking: Reported on 8/26/2022)    azelastine (ASTELIN) 137 mcg (0.1 %) nasal spray 1 [place] by Both Nostrils route two (2) times a day. Use in each nostril as directed  Indications: Vasomotor Rhinitis (Patient not taking: Reported on 8/24/8589)    folic acid/multivit-min/lutein (CENTRUM SILVER PO) Take  by mouth. (Patient not taking: Reported on 8/26/2022)     No current facility-administered medications for this visit. [facility] follow up appointment(s): No future appointments. Non-[facility] follow up appointment(s): NA        Patient reminded that there are physicians on call 24 hours a day / 7 days a week (M-F 5pm to 8am and from Friday 5pm until Monday 8am for the weekend) should the patient have questions or concerns.       Goals Addressed                   This Visit's Progress     Understands red flags post ED discharge        Patient will take antibiotics as prescribed  Elevate leg to decrease swelling  Follow directions to cleanse leg with clear water twice a day and apply Vaseline  Notify physician if:  Increased pain, swelling, redness or warmth  Pus begins draining from leg  Rash develops  Temp > 101.5

## 2022-08-29 ENCOUNTER — PATIENT OUTREACH (OUTPATIENT)
Dept: CASE MANAGEMENT | Age: 72
End: 2022-08-29

## 2022-08-29 NOTE — PROGRESS NOTES
Initial Contact Social Work Note - Ambulatory  8/29/2022      Date of referral: 8/29/2022  Referral received from: RAMAKRISHNA Neumann RN  Reason for referral: Assist the patient with obtaining PCA and assist with ACP process and completion    Previous SW Referral: No    If yes, brief summary and outcome:  None    Two Identifiers Verified: Yes    Insurance Provider: [employer], Medicaid of [address]: Community Provider     [place] Status: N/A    Community Providers: Medicaid Long-Term Care     ADL Assistance: Bathing and Dressing    Housing/Living Concerns or Home Modification Needs: None mentioned    Transportation Concern: None    Medication Cost Concern: None    Medication Education or Adherence Concern: None    Financial Concern(s): None    Income (only if applicable): N/A     Ability to Read/Write: Yes    Advance Care Plan:  Not on file; education provided    Other: N/A    Identified Needs:     Patient seeking PCA assistance  Patient interested in the ACP process and completion    Social Work Plan:    Assist the patient with contacting the [address] [place] [address] Ave to request screening for level of care. Assist the patient as needed to locate an agency to staff her care  Educate the patient on the process and completion of ACP. Mail ACP packet to the patient's home address  Confirm receipt of packet  Assist the patient , if necessary to complete AMD document. Method of Communication with Provider (if appropriate): chart routing     MSW contacted the patient and introduced self, role and reason for call. Ms. Verner Platt is alert and oriented and engaged in conversation. The patient is a 67year old woman who resides alone in the community. She uses a walker to assist with ambulation. The patient informed MSW that she is needing more assistance with ADLs/IADLs.    MSW informed the patient that she will need to have a screening scheduled with the [address] to determine her level of care. Ms. Alberto De La Cruz has Medicaid of [place] which would cover the cost for her to have a personal care aid once it is determined that she qualifies for the service. The patient was made aware and verbalized understanding. MSW contacted the [address] and spoke with an  who provided with the name of the patient's assigned , Ms. Kai. Call was transferred to the , however she was not available and her voicemail was full. MSW was unable to leave a message. MSW contacted the patient to provide with updates. She voiced understanding. MSW will continue with attempts to contact the patient's  to schedule appointment for screening. MSW will follow to assist the patient as needed.

## 2022-08-31 ENCOUNTER — PATIENT OUTREACH (OUTPATIENT)
Dept: CASE MANAGEMENT | Age: 72
End: 2022-08-31

## 2022-08-31 NOTE — PROGRESS NOTES
Social Work Note  8/31/2022    Date of referral: 8/29/2022  Referral received from: RAMAKRISHNA Neumann RN  Reason for referral: Assist the patient with obtaining PCA and assist with ACP process and completion     Previous SW Referral: No    If yes, brief summary and outcome:  None    Attempt made to reach the patient's  at the [facility] [employer] [facility]. The voicemail continues to be full. Left message for the  to return call. MSW will follow to assist the patient as needed.

## 2022-09-01 ENCOUNTER — PATIENT OUTREACH (OUTPATIENT)
Dept: CASE MANAGEMENT | Age: 72
End: 2022-09-01

## 2022-09-01 RX ORDER — PETROLATUM,WHITE
OINTMENT IN PACKET (GRAM) TOPICAL 2 TIMES DAILY
COMMUNITY

## 2022-09-01 NOTE — PROGRESS NOTES
Complex Case Management  Follow-Up       Date/Time:   9/1/22  2:17 PM       Ambulatory Care Manager ( ACM) contacted patient for Complex Case Management  follow up. Spoke to patient  Introduced self/role and reason for call. Patient reported: Right leg \" feels a whole lot better. Looks better too\". Pertinent concerns:right leg - denies any drainage or odor. Continues to clean with clear water and apply Vaseline twice a day. Specialist appointments since last outreach? no   If so, specialist and date: NA    Next PCP Appointment: 9/9/22 ED follow up    Medications:     New medications since last outreach: yes - Doxycycline  Does patient need refills on any medications: no   Medication changes since last outreach (dose adjustments or discontinued meds): no      Home Health company: NA    Barriers to care? none noted  MSW working with patient for personal care        Patient reminded that there are physicians on call 24 hours a day / 7 days a week (M-F 5pm to 8am and from Friday 5pm until Monday 8a for the weekend) should the patient have questions or concerns.        Goals        Understands red flags post ED discharge      Patient will take antibiotics as prescribed  Elevate leg to decrease swelling  Follow directions to cleanse leg with clear water twice a day and apply Vaseline  Notify physician if:  Increased pain, swelling, redness or warmth  Pus begins draining from leg  Rash develops  Temp > 101.5

## 2022-09-02 ENCOUNTER — PATIENT OUTREACH (OUTPATIENT)
Dept: CASE MANAGEMENT | Age: 72
End: 2022-09-02

## 2022-09-02 NOTE — PROGRESS NOTES
Social Work Note  9/2/2022    Date of referral: 8/29/2022  Referral received from: RAMAKRISHNA Nunn RN  Reason for referral: Assist the patient with obtaining PCA and assist with ACP process and completion     Previous SW Referral: No    If yes, brief summary and outcome:  None     Two Identifiers Verified: Yes     Insurance Provider: [employer], Medicaid of [address] [place] [address]: Community Provider       Status: N/A     Community Providers: Medicaid Long-Term Care      ADL Assistance: Bathing and Dressing     Housing/Living Concerns or Home Modification Needs: None mentioned     Transportation Concern: None     Medication Cost Concern: None     Medication Education or Adherence Concern: None     Financial Concern(s): None     Income (only if applicable): N/A      Ability to Read/Write: Yes     Advance Care Plan:  Not on file; education provided     Other: N/A     Identified Needs:      Patient seeking PCA assistance  Patient interested in the ACP process and completion     Social Work Plan:     Assist the patient with contacting the [address] to request screening for level of care. Assist the patient as needed to locate an agency to staff her care  Educate the patient on the process and completion of ACP. (Completed)  Mail ACP packet to the patient's home address (Completed)  Confirm receipt of packet  Assist the patient , if necessary to complete AMD document. Method of Communication with Provider (if appropriate): chart routing      MSW contacted the patient and introduced self, role and reason for call. Ms. Calderon [place] is alert and oriented and engaged in conversation. The patient is a 67year old woman who resides alone in the community. She uses a walker to assist with ambulation. MSW educated the patient on the process and completion of ACP. She was made aware that a packet has been mailed to her home address. She verbalized understanding.      Call was made to the [employer] and  provided direct number to the Adult Screening line ([phone]). MSW left message for return call to be made to the patient for screening appointment scheduling. MsCriselda Carson Callow was made aware. MSW has been unable to reach the patient's assigned  due to her voicemail being full. MSW will follow to assist the patient as needed.

## 2022-09-07 ENCOUNTER — PATIENT OUTREACH (OUTPATIENT)
Dept: CASE MANAGEMENT | Age: 72
End: 2022-09-07

## 2022-09-07 NOTE — PROGRESS NOTES
Social Work Note  9/7/2022    Date of referral: 8/29/2022  Referral received from: RAMAKRISHNA Brady RN  Reason for referral: Assist the patient with obtaining PCA and assist with ACP process and completion     Previous SW Referral: No    If yes, brief summary and outcome:  None     Two Identifiers Verified: Yes     Insurance Provider: [facility] HMO, Medicaid of [address] [place] [address]: Community Provider       Status: N/A     Community Providers: Medicaid Long-Term Care      ADL Assistance: Bathing and Dressing     Housing/Living Concerns or Home Modification Needs: None mentioned     Transportation Concern: None     Medication Cost Concern: None     Medication Education or Adherence Concern: None     Financial Concern(s): None     Income (only if applicable): N/A      Ability to Read/Write: Yes     Advance Care Plan:  Not on file; education provided     Other: N/A     Identified Needs:      Patient seeking PCA assistance  Patient interested in the ACP process and completion     Social Work Plan:     Assist the patient with contacting the [address] to request screening for level of care. (Completed)  Assist the patient as needed to locate an agency to staff her care  Educate the patient on the process and completion of ACP. (Completed)  Mail ACP packet to the patient's home address (Completed)  Confirm receipt of packet  Assist the patient , if necessary to complete AMD document. Method of Communication with Provider (if appropriate): chart routing      MSW contacted the patient and introduced self, role and reason for call. Ms. Alonzo Arnold is alert and oriented and engaged in conversation. The patient is a 67year old woman who resides alone in the community. She uses a walker to assist with ambulation. MSW made the patient aware that someone from the Dept of  will be contacting her to schedule an in person appointment to complete screening.  She voiced understanding and appreciation. Call received from the [facility] screening hotline agent, Neela Pringle. She informed MSW that call will be made to the patient to schedule an appointment for face to face screening. She informed that the process may take 3-4 weeks. MSW will follow to assist the patient as needed.

## 2022-09-09 ENCOUNTER — OFFICE VISIT (OUTPATIENT)
Dept: INTERNAL MEDICINE CLINIC | Age: 72
End: 2022-09-09
Payer: MEDICARE

## 2022-09-09 VITALS
RESPIRATION RATE: 17 BRPM | TEMPERATURE: 97.1 F | WEIGHT: 222 LBS | HEIGHT: 61 IN | DIASTOLIC BLOOD PRESSURE: 59 MMHG | BODY MASS INDEX: 41.91 KG/M2 | OXYGEN SATURATION: 100 % | HEART RATE: 92 BPM | SYSTOLIC BLOOD PRESSURE: 150 MMHG

## 2022-09-09 DIAGNOSIS — L29.9 PRURITUS: ICD-10-CM

## 2022-09-09 DIAGNOSIS — I89.0 ELEPHANTIASIS NOSTRA VERRUCOSA: ICD-10-CM

## 2022-09-09 DIAGNOSIS — L08.9 SKIN INFECTION: Primary | ICD-10-CM

## 2022-09-09 PROCEDURE — G9899 SCRN MAM PERF RSLTS DOC: HCPCS | Performed by: INTERNAL MEDICINE

## 2022-09-09 PROCEDURE — G8427 DOCREV CUR MEDS BY ELIG CLIN: HCPCS | Performed by: INTERNAL MEDICINE

## 2022-09-09 PROCEDURE — 1123F ACP DISCUSS/DSCN MKR DOCD: CPT | Performed by: INTERNAL MEDICINE

## 2022-09-09 PROCEDURE — G8754 DIAS BP LESS 90: HCPCS | Performed by: INTERNAL MEDICINE

## 2022-09-09 PROCEDURE — 1101F PT FALLS ASSESS-DOCD LE1/YR: CPT | Performed by: INTERNAL MEDICINE

## 2022-09-09 PROCEDURE — 1090F PRES/ABSN URINE INCON ASSESS: CPT | Performed by: INTERNAL MEDICINE

## 2022-09-09 PROCEDURE — G8536 NO DOC ELDER MAL SCRN: HCPCS | Performed by: INTERNAL MEDICINE

## 2022-09-09 PROCEDURE — G8432 DEP SCR NOT DOC, RNG: HCPCS | Performed by: INTERNAL MEDICINE

## 2022-09-09 PROCEDURE — G8753 SYS BP > OR = 140: HCPCS | Performed by: INTERNAL MEDICINE

## 2022-09-09 PROCEDURE — 3017F COLORECTAL CA SCREEN DOC REV: CPT | Performed by: INTERNAL MEDICINE

## 2022-09-09 PROCEDURE — 99213 OFFICE O/P EST LOW 20 MIN: CPT | Performed by: INTERNAL MEDICINE

## 2022-09-09 PROCEDURE — G8399 PT W/DXA RESULTS DOCUMENT: HCPCS | Performed by: INTERNAL MEDICINE

## 2022-09-09 PROCEDURE — G8417 CALC BMI ABV UP PARAM F/U: HCPCS | Performed by: INTERNAL MEDICINE

## 2022-09-09 RX ORDER — DOXYCYCLINE 100 MG/1
100 CAPSULE ORAL 2 TIMES DAILY
Qty: 20 CAPSULE | Refills: 0 | Status: SHIPPED | OUTPATIENT
Start: 2022-09-09 | End: 2022-09-19

## 2022-09-09 RX ORDER — HYDROXYZINE 25 MG/1
25 TABLET, FILM COATED ORAL
Qty: 90 TABLET | Refills: 5 | Status: SHIPPED | OUTPATIENT
Start: 2022-09-09

## 2022-09-09 RX ORDER — SODIUM HYPOCHLORITE 1.25 MG/ML
SOLUTION TOPICAL
Qty: 1000 ML | Refills: 1 | Status: SHIPPED | OUTPATIENT
Start: 2022-09-09

## 2022-09-09 NOTE — PROGRESS NOTES
HISTORY OF PRESENT ILLNESS  Cyn Perry is a 67 y.o. female. BP (!) 150/59 (BP 1 Location: Left upper arm, BP Patient Position: Sitting, BP Cuff Size: Large adult)   Pulse 92   Temp 97.1 °F (36.2 °C) (Temporal)   Resp 17   Ht 5' 1\" (1.549 m)   Wt 222 lb (100.7 kg)   SpO2 100%   BMI 41.95 kg/m²     Here to f/u [facility] where she was treated for a skin infection. Placed on doxycycline. She recalls no injury to set this off. She has elephantiasis nostra verrucosa. Noted some odor and drainage. Reports this is better    She has been using vaseline on parts of the skin. She reports also that after bathing sometimes the larger scales come off    Leg Swelling  The history is provided by the Patient. This is a chronic problem. The current episode started more than 1 week ago. ED Follow-up      Review of Systems   Constitutional:  Negative for chills and fever. Cardiovascular:  Positive for leg swelling (chronic edema and stasis dermatitis). Skin:  Positive for itching (around face and neck). Physical Exam  Vitals and nursing note reviewed. Constitutional:       Appearance: Normal appearance. She is well-developed. Cardiovascular:      Rate and Rhythm: Normal rate. Pulmonary:      Effort: Pulmonary effort is normal.   Skin:     General: Skin is warm. Comments: Extensive verrucous changes on her legs debora her right lower leg. Weeping noted anteriorly. Discrete odor noted as well. Neurological:      General: No focal deficit present. Mental Status: She is alert and oriented to person, place, and time. Psychiatric:         Mood and Affect: Mood normal.         Behavior: Behavior normal.       ASSESSMENT and PLAN    ICD-10-CM ICD-9-CM    1. Skin infection  L08.9 686.9 doxycycline (VIBRAMYCIN) 100 mg capsule      sodium hypochlorite (Dakin's Solution) 0.125 % soln external solution      2. Elephantiasis nostra verrucosa  I89.0 457.1       3.  Pruritus  L29.9 698.9           Continue doxycycline  Apply Dakins 1/4 strength BID (formula provided to patient to prepare at home) BID.  Allow to dry    F/u with derm as appointed    F/u 2 weeks for recheck

## 2022-09-09 NOTE — PROGRESS NOTES
1. \"Have you been to the ER, urgent care clinic since your last visit? Hospitalized since your last visit? \" Yes [facility] ED for leg swelling    2. \"Have you seen or consulted any other health care providers outside of the [address] [place] [address] since your last visit? \" No     3. For patients aged 39-70: Has the patient had a colonoscopy / FIT/ Cologuard? Yes - no Care Gap present      If the patient is female:    4. For patients aged 41-77: Has the patient had a mammogram within the past 2 years? Yes - no Care Gap present      5. For patients aged 21-65: Has the patient had a pap smear?  NA - based on age or sex

## 2022-09-12 ENCOUNTER — PATIENT OUTREACH (OUTPATIENT)
Dept: CASE MANAGEMENT | Age: 72
End: 2022-09-12

## 2022-09-12 NOTE — PROGRESS NOTES
Complex Case Management  Follow-Up       Date/Time:   9/12/22  11:00 AM       Ambulatory Care Manager ( ACM) contacted patient for Complex Case Management  follow up. Spoke to patient  Introduced self/role and reason for call. Patient reported: Face and neck continue to itch \" I was itching bad this morning. \"       Pertinent concerns:Right leg \" looking and feeling a lot better. It has cleared up a whole lot since I started the Dakins\"   Safety = Patient had 2 bottles of medication that she could not read label, ACM instructed her to dispose of these bottles. Patient verbalized understanding. Specialist appointments since last outreach? No   If so, specialist and date: NA    Next PCP Appointment: follow up in 2 weeks, patient has not made appointment yet. Dermatology - 9/26/22    Medications:     New medications since last outreach: yes - Doxycycline to be delivered today. 0.125% Dakins Solution  Does patient need refills on any medications: no   Medication changes since last outreach (dose adjustments or discontinued meds): no      Home Health company: NA    Barriers to care? none noted. DSS to contact patient in 3-4 weeks for face to face screening. Patient reminded that there are physicians on call 24 hours a day / 7 days a week (M-F 5pm to 8am and from Friday 5pm until Monday 8a for the weekend) should the patient have questions or concerns. Goals        Understands red flags post ED discharge      Patient will take antibiotics as prescribed  Elevate leg to decrease swelling  Follow directions to cleanse leg with clear water twice a day and apply Vaseline  Notify physician if:  Increased pain, swelling, redness or warmth  Pus begins draining from leg  Rash develops  Temp > 101.5    9/12/22  Care to leg changed: Cleanse with water. Apply 1/4 st. Dakins Solution twice a day.

## 2022-09-20 ENCOUNTER — PATIENT OUTREACH (OUTPATIENT)
Dept: CASE MANAGEMENT | Age: 72
End: 2022-09-20

## 2022-09-20 NOTE — PROGRESS NOTES
Complex Case Management      Date/Time:  2022 2:30 PM    Method of communication with patient:phone    LPN Care Coordinator (LPN CC) contacted the patient by telephone to perform Ambulatory Care Coordination. Verified name and  (PHI) with patient as identifiers. Provided introduction to self, and explanation of the LPN CC's role. Reviewed most recent clinic visit w/ patient who verbalized understanding. Patient given an opportunity to ask questions. Patient doing well. Taking abt as prescribed and wound is looking good per patient. Top Challenges reviewed with the Provider   N/A     The patient agrees to contact the PCP office or the LPN CC for questions related to their healthcare. Provided contact information for future reference. Disease Specific:   N/A    Home Health Active: No    DME Active: No    Barriers to care?  none    Advance Care Planning:   Does patient have an Advance Directive:  not on file; education provided     Medication(s):   Medication reconciliation was not performed with patient, who verbalizes understanding of administration of home medications. There were no barriers to obtaining medications identified at this time. Referral to Pharm D needed: no     Current Outpatient Medications   Medication Sig    vitamin F-N-H-lutein-minerals (OCUVITE) tablet Take 1 Tablet by mouth daily. hydrOXYzine HCL (ATARAX) 25 mg tablet Take 1 Tablet by mouth three (3) times daily as needed for Itching. Indications: itching    sodium hypochlorite (Dakin's Solution) 0.125 % soln external solution Apply daily to infected skin. Indications: skin disinfection    white petrolatum (VASELINE) topical onitment Apply  to affected area two (2) times a day. Wash right leg with clear water, pat dry and apply Vaseline    beta-carotene,A,-vits C,E/mins (OCUVITE PO) Take  by mouth.     FeroSuL 325 mg (65 mg iron) tablet TAKE 1 TABLET BY MOUTH EVERY DAY BEFORE BREAKFAST    simvastatin (ZOCOR) 20 mg tablet TAKE 1 TABLET BY MOUTH DAILY AT BEDTIME    cimetidine (TAGAMET) 400 mg tablet TAKE 1 TABLET BY MOUTH TWO (2) TIMES A DAY. telmisartan-hydroCHLOROthiazide (MICARDIS HCT) 40-12.5 mg per tablet TAKE 1 TABLET BY MOUTH DAILY. furosemide (LASIX) 40 mg tablet Take 1 Tablet by mouth daily. Indications: visible water retention    gabapentin (NEURONTIN) 100 mg capsule Take 1 Capsule by mouth two (2) times a day. Max Daily Amount: 200 mg. Indications: pruritis (Patient not taking: No sig reported)    clobetasoL (TEMOVATE) 0.05 % ointment APPLY TO LEGS TWICE DAILY AS NEEDED FOR 2 WEEKS THEN STOP FOR A WEEK AND REPEAT AS NEEDED (Patient not taking: No sig reported)    azelastine (ASTELIN) 137 mcg (0.1 %) nasal spray 1 [place] by Both Nostrils route two (2) times a day. Use in each nostril as directed  Indications: Vasomotor Rhinitis    ammonium lactate (LAC-HYDRIN) 12 % lotion Apply daily to legs after bathing (Patient not taking: No sig reported)    folic acid/multivit-min/lutein (CENTRUM SILVER PO) Take  by mouth. (Patient not taking: No sig reported)     No current facility-administered medications for this visit. [facility] follow up appointment(s):   Future Appointments   Date Time Provider Clare Chase   9/30/2022 11:15 AM [place], Sherryle Shope, MD [facility]B        Non-[facility] follow up appointment(s): Dermatalogy 9/26     Goals Addressed                   This Visit's Progress     Understands red flags post ED discharge   On track     Patient will take antibiotics as prescribed  Elevate leg to decrease swelling  Follow directions to cleanse leg with clear water twice a day and apply Vaseline  Notify physician if:  Increased pain, swelling, redness or warmth  Pus begins draining from leg  Rash develops  Temp > 101.5    9/12/22  Care to leg changed: Cleanse with water. Apply 1/4 st. Dakins Solution twice a day.

## 2022-09-30 ENCOUNTER — PATIENT OUTREACH (OUTPATIENT)
Dept: CASE MANAGEMENT | Age: 72
End: 2022-09-30

## 2022-09-30 NOTE — PROGRESS NOTES
Social Work Note  9/30/2022    Date of referral: 8/29/2022  Referral received from: RAMAKRISHNA Wu RN  Reason for referral: Assist the patient with obtaining PCA and assist with ACP process and completion     Previous SW Referral: No    If yes, brief summary and outcome:  None     Two Identifiers Verified: Yes     Insurance Provider: [facility] HMO, Medicaid of [address] [place] [address]: Community Provider       Status: N/A     Community Providers: Medicaid Long-Term Care      ADL Assistance: Bathing and Dressing     Housing/Living Concerns or Home Modification Needs: None mentioned     Transportation Concern: None     Medication Cost Concern: None     Medication Education or Adherence Concern: None     Financial Concern(s): None     Income (only if applicable): N/A      Ability to Read/Write: Yes     Advance Care Plan:  Not on file; education provided     Other: N/A     Identified Needs:      Patient seeking PCA assistance  Patient interested in the ACP process and completion     Social Work Plan:     Assist the patient with contacting the [address] to request screening for level of care. (Completed)  Assist the patient as needed to locate an agency to staff her care  Educate the patient on the process and completion of ACP. (Completed)  Mail ACP packet to the patient's home address (Completed)  Confirm receipt of packet (Completed)  Assist the patient , if necessary to complete AMD document. Method of Communication with Provider (if appropriate): chart routing      MSW contacted the patient and introduced self, role and reason for call. Ms. Dean Minor is alert and oriented and engaged in conversation. The patient is a 67year old woman who resides alone in the community. She uses a walker to assist with ambulation. Ms Dean Minor informed MSW that call was received from the [facility] and she is scheduled to be screened on 10/7/2022.  The patient also confirmed receipt of ACP packet. She mentioned that she has not been able to review the content of the packet. Ms. Robert Jayson had no other issues or concerns to report. MSW will follow to assist the patient as needed.

## 2022-10-06 DIAGNOSIS — Z76.0 MEDICATION REFILL: ICD-10-CM

## 2022-10-06 RX ORDER — SIMVASTATIN 20 MG/1
TABLET, FILM COATED ORAL
Qty: 30 TABLET | Refills: 4 | Status: SHIPPED | OUTPATIENT
Start: 2022-10-06

## 2022-10-10 ENCOUNTER — PATIENT OUTREACH (OUTPATIENT)
Dept: CASE MANAGEMENT | Age: 72
End: 2022-10-10

## 2022-10-10 NOTE — PROGRESS NOTES
Complex Case Management      Date/Time:  10/10/2022 2:58 PM    Method of communication with patient:phone    LPN Care Coordinator (LPN CC) contacted the patient by telephone to perform Ambulatory Care Coordination. Verified name and  (PHI) with patient as identifiers. Provided introduction to self, and explanation of the LPN CC's role. Reviewed most recent clinic visit w/ patient who verbalized understanding. Patient given an opportunity to ask questions. Top Challenges reviewed with the Provider   N/a     Patient doing well. Leg healing well. The patient agrees to contact the PCP office or the LPN CC for questions related to their healthcare. Provided contact information for future reference. Disease Specific:   N/A    Home Health Active: No    DME Active: No    Barriers to care?  none        Current Outpatient Medications   Medication Sig    simvastatin (ZOCOR) 20 mg tablet TAKE 1 TABLET BY MOUTH DAILY AT BEDTIME    vitamin D-F-R-lutein-minerals (OCUVITE) tablet Take 1 Tablet by mouth daily. hydrOXYzine HCL (ATARAX) 25 mg tablet Take 1 Tablet by mouth three (3) times daily as needed for Itching. Indications: itching    sodium hypochlorite (Dakin's Solution) 0.125 % soln external solution Apply daily to infected skin. Indications: skin disinfection    white petrolatum (VASELINE) topical onitment Apply  to affected area two (2) times a day. Wash right leg with clear water, pat dry and apply Vaseline    beta-carotene,A,-vits C,E/mins (OCUVITE PO) Take  by mouth. FeroSuL 325 mg (65 mg iron) tablet TAKE 1 TABLET BY MOUTH EVERY DAY BEFORE BREAKFAST    cimetidine (TAGAMET) 400 mg tablet TAKE 1 TABLET BY MOUTH TWO (2) TIMES A DAY. telmisartan-hydroCHLOROthiazide (MICARDIS HCT) 40-12.5 mg per tablet TAKE 1 TABLET BY MOUTH DAILY. furosemide (LASIX) 40 mg tablet Take 1 Tablet by mouth daily.  Indications: visible water retention    gabapentin (NEURONTIN) 100 mg capsule Take 1 Capsule by mouth two (2) times a day. Max Daily Amount: 200 mg. Indications: pruritis (Patient not taking: No sig reported)    clobetasoL (TEMOVATE) 0.05 % ointment APPLY TO LEGS TWICE DAILY AS NEEDED FOR 2 WEEKS THEN STOP FOR A WEEK AND REPEAT AS NEEDED (Patient not taking: No sig reported)    azelastine (ASTELIN) 137 mcg (0.1 %) nasal spray 1 [place] by Both Nostrils route two (2) times a day. Use in each nostril as directed  Indications: Vasomotor Rhinitis    ammonium lactate (LAC-HYDRIN) 12 % lotion Apply daily to legs after bathing (Patient not taking: No sig reported)    folic acid/multivit-min/lutein (CENTRUM SILVER PO) Take  by mouth. (Patient not taking: No sig reported)     No current facility-administered medications for this visit. [facility] follow up appointment(s):   Future Appointments   Date Time Provider Clare Chase   10/18/2022 11:15 AM David Clark MD [facility] BS AMB        Non-[facility] follow up appointment(s): dermtology     Goals Addressed                   This Visit's Progress     Understands red flags post ED discharge   On track     Patient will take antibiotics as prescribed  Elevate leg to decrease swelling  Follow directions to cleanse leg with clear water twice a day and apply Vaseline  Notify physician if:  Increased pain, swelling, redness or warmth  Pus begins draining from leg  Rash develops  Temp > 101.5    9/12/22  Care to leg changed: Cleanse with water. Apply 1/4 st. Dakins Solution twice a day.

## 2022-10-17 RX ORDER — FERROUS SULFATE TAB 325 MG (65 MG ELEMENTAL FE) 325 (65 FE) MG
TAB ORAL
Qty: 30 TABLET | Refills: 2 | Status: SHIPPED | OUTPATIENT
Start: 2022-10-17

## 2022-10-18 ENCOUNTER — OFFICE VISIT (OUTPATIENT)
Dept: INTERNAL MEDICINE CLINIC | Age: 72
End: 2022-10-18
Payer: MEDICARE

## 2022-10-18 VITALS
WEIGHT: 225 LBS | TEMPERATURE: 97 F | HEIGHT: 61 IN | HEART RATE: 98 BPM | OXYGEN SATURATION: 100 % | BODY MASS INDEX: 42.48 KG/M2 | SYSTOLIC BLOOD PRESSURE: 153 MMHG | DIASTOLIC BLOOD PRESSURE: 71 MMHG | RESPIRATION RATE: 15 BRPM

## 2022-10-18 DIAGNOSIS — Z23 NEEDS FLU SHOT: ICD-10-CM

## 2022-10-18 DIAGNOSIS — L08.9 SKIN INFECTION: ICD-10-CM

## 2022-10-18 DIAGNOSIS — I89.0 ELEPHANTIASIS NOSTRA VERRUCOSA: ICD-10-CM

## 2022-10-18 DIAGNOSIS — I10 ESSENTIAL HYPERTENSION: Primary | ICD-10-CM

## 2022-10-18 PROCEDURE — G9899 SCRN MAM PERF RSLTS DOC: HCPCS | Performed by: INTERNAL MEDICINE

## 2022-10-18 PROCEDURE — 99213 OFFICE O/P EST LOW 20 MIN: CPT | Performed by: INTERNAL MEDICINE

## 2022-10-18 PROCEDURE — G8399 PT W/DXA RESULTS DOCUMENT: HCPCS | Performed by: INTERNAL MEDICINE

## 2022-10-18 PROCEDURE — 3017F COLORECTAL CA SCREEN DOC REV: CPT | Performed by: INTERNAL MEDICINE

## 2022-10-18 PROCEDURE — G8510 SCR DEP NEG, NO PLAN REQD: HCPCS | Performed by: INTERNAL MEDICINE

## 2022-10-18 PROCEDURE — 1101F PT FALLS ASSESS-DOCD LE1/YR: CPT | Performed by: INTERNAL MEDICINE

## 2022-10-18 PROCEDURE — G8536 NO DOC ELDER MAL SCRN: HCPCS | Performed by: INTERNAL MEDICINE

## 2022-10-18 PROCEDURE — G0008 ADMIN INFLUENZA VIRUS VAC: HCPCS | Performed by: INTERNAL MEDICINE

## 2022-10-18 PROCEDURE — G8427 DOCREV CUR MEDS BY ELIG CLIN: HCPCS | Performed by: INTERNAL MEDICINE

## 2022-10-18 PROCEDURE — 90694 VACC AIIV4 NO PRSRV 0.5ML IM: CPT | Performed by: INTERNAL MEDICINE

## 2022-10-18 PROCEDURE — G8753 SYS BP > OR = 140: HCPCS | Performed by: INTERNAL MEDICINE

## 2022-10-18 PROCEDURE — 1090F PRES/ABSN URINE INCON ASSESS: CPT | Performed by: INTERNAL MEDICINE

## 2022-10-18 PROCEDURE — G8754 DIAS BP LESS 90: HCPCS | Performed by: INTERNAL MEDICINE

## 2022-10-18 PROCEDURE — G8417 CALC BMI ABV UP PARAM F/U: HCPCS | Performed by: INTERNAL MEDICINE

## 2022-10-18 PROCEDURE — 1123F ACP DISCUSS/DSCN MKR DOCD: CPT | Performed by: INTERNAL MEDICINE

## 2022-10-18 NOTE — PROGRESS NOTES
HISTORY OF PRESENT ILLNESS  Cyn Perry is a 67 y.o. female. BP (!) 153/71 (BP 1 Location: Left upper arm)   Pulse 98   Temp 97 °F (36.1 °C) (Temporal)   Resp 15   Ht 5' 1\" (1.549 m)   Wt 225 lb (102.1 kg)   SpO2 100%   BMI 42.51 kg/m²     She is here to f/u on her skin. We had placed her on doxycycline. And referred her to dermatology  She has been to the dermatologist and is now using Dakin's solution and topical cortisone salve  She reports doing well. No drainage. No pain  Dermatology will f/u with her          Current Outpatient Medications:   ·  FeroSuL 325 mg (65 mg iron) tablet, TAKE 1 TABLET BY MOUTH EVERY DAY BEFORE BREAKFAST, Disp: 30 Tablet, Rfl: 2  ·  simvastatin (ZOCOR) 20 mg tablet, TAKE 1 TABLET BY MOUTH DAILY AT BEDTIME, Disp: 30 Tablet, Rfl: 4  ·  vitamin I-E-Q-lutein-minerals (OCUVITE) tablet, Take 1 Tablet by mouth daily. , Disp: , Rfl:   ·  hydrOXYzine HCL (ATARAX) 25 mg tablet, Take 1 Tablet by mouth three (3) times daily as needed for Itching. Indications: itching, Disp: 90 Tablet, Rfl: 5  ·  sodium hypochlorite (Dakin's Solution) 0.125 % soln external solution, Apply daily to infected skin. Indications: skin disinfection, Disp: 1000 mL, Rfl: 1  ·  white petrolatum (VASELINE) topical onitment, Apply  to affected area two (2) times a day. Wash right leg with clear water, pat dry and apply Vaseline, Disp: , Rfl:   ·  cimetidine (TAGAMET) 400 mg tablet, TAKE 1 TABLET BY MOUTH TWO (2) TIMES A DAY., Disp: 60 Tablet, Rfl: 5  ·  telmisartan-hydroCHLOROthiazide (MICARDIS HCT) 40-12.5 mg per tablet, TAKE 1 TABLET BY MOUTH DAILY. , Disp: 90 Tablet, Rfl: 2  ·  furosemide (LASIX) 40 mg tablet, Take 1 Tablet by mouth daily. Indications: visible water retention, Disp: , Rfl:   ·  gabapentin (NEURONTIN) 100 mg capsule, Take 1 Capsule by mouth two (2) times a day. Max Daily Amount: 200 mg.  Indications: pruritis, Disp: 60 Capsule, Rfl: 1  ·  azelastine (ASTELIN) 137 mcg (0.1 %) nasal spray, 1 Spray by Both Nostrils route two (2) times a day. Use in each nostril as directed  Indications: Vasomotor Rhinitis, Disp: 1 Bottle, Rfl: 1  ·  beta-carotene,A,-vits C,E/mins (OCUVITE PO), Take  by mouth. (Patient not taking: Reported on 10/18/2022), Disp: , Rfl:   ·  clobetasoL (TEMOVATE) 0.05 % ointment, APPLY TO LEGS TWICE DAILY AS NEEDED FOR 2 WEEKS THEN STOP FOR A WEEK AND REPEAT AS NEEDED (Patient not taking: No sig reported), Disp: 30 g, Rfl: 4  ·  ammonium lactate (LAC-HYDRIN) 12 % lotion, Apply daily to legs after bathing (Patient not taking: No sig reported), Disp: 400 mL, Rfl: 5  ·  folic acid/multivit-min/lutein (CENTRUM SILVER PO), Take  by mouth. (Patient not taking: No sig reported), Disp: , Rfl:       Skin Problem  The history is provided by the Patient. This is a chronic problem. Review of Systems   Constitutional:  Negative for chills and fever. Skin:         Dry cracked skin as before. But no drainage     Physical Exam  Vitals and nursing note reviewed. Constitutional:       Appearance: Normal appearance. She is well-developed. Cardiovascular:      Rate and Rhythm: Normal rate and regular rhythm. Pulmonary:      Effort: Pulmonary effort is normal.      Breath sounds: Normal breath sounds. Musculoskeletal:      Right lower leg: Edema present. Left lower leg: Edema present. Skin:     General: Skin is warm and dry. Comments: Much improved. No blisters/vessicles, no drainage. Slight odor on the right. No redness or tenderness   Neurological:      General: No focal deficit present. Mental Status: She is alert and oriented to person, place, and time. Psychiatric:         Mood and Affect: Mood normal.         Behavior: Behavior normal.       ASSESSMENT and PLAN    ICD-10-CM ICD-9-CM    1. Essential hypertension  I10 401.9       2. Skin infection  L08.9 686.9       3. Elephantiasis nostra verrucosa  I89.0 457.1       4.  Needs flu shot  Z23 V04.81 INFLUENZA, FLUAD, (AGE 65 Y+), IM, PF, 0.5 ML        BP up some but coming down with rest  Legs show significant improvement  F/u with dermatology as appointed  F/u 6-8 weeks for BP recheck.  May need med adjustment

## 2022-10-18 NOTE — PROGRESS NOTES
1. \"Have you been to the ER, urgent care clinic since your last visit? Hospitalized since your last visit? \" No    2. \"Have you seen or consulted any other health care providers outside of the [address] [place] [address] since your last visit? \" Yes Dermatology 9/2022      3. For patients aged 39-70: Has the patient had a colonoscopy / FIT/ Cologuard? Yes - no Care Gap present      If the patient is female:    4. For patients aged 41-77: Has the patient had a mammogram within the past 2 years? Yes - no Care Gap present      5. For patients aged 21-65: Has the patient had a pap smear?  NA - based on age or sex

## 2022-10-20 ENCOUNTER — PATIENT OUTREACH (OUTPATIENT)
Dept: CASE MANAGEMENT | Age: 72
End: 2022-10-20

## 2022-10-20 NOTE — PROGRESS NOTES
Social Work Note  10/20/2022    Date of referral: 8/29/2022  Referral received from: RAMAKRISHNA Barker RN  Reason for referral: Assist the patient with obtaining PCA and assist with ACP process and completion     Previous  Referral: No    If yes, brief summary and outcome:  None     Two Identifiers Verified: Yes     Insurance Provider: [facility] HMO, Medicaid of [address] [place] [address]: Community Provider       Status: N/A     Community Providers: Medicaid Long-Term Care      ADL Assistance: Bathing and Dressing     Housing/Living Concerns or Home Modification Needs: None mentioned     Transportation Concern: None     Medication Cost Concern: None     Medication Education or Adherence Concern: None     Financial Concern(s): None     Income (only if applicable): N/A      Ability to Read/Write: Yes     Advance Care Plan:  Not on file; education provided     Other: N/A     Identified Needs:      Patient seeking PCA assistance  Patient interested in the ACP process and completion     Social Work Plan:     Assist the patient with contacting the [address] to request screening for level of care. (Completed)  Assist the patient as needed to locate an agency to staff her care  Educate the patient on the process and completion of ACP. (Completed)  Mail ACP packet to the patient's home address (Completed)  Confirm receipt of packet (Completed)  Assist the patient , if necessary to complete AMD document. Method of Communication with Provider (if appropriate): chart routing      MSW contacted the patient and introduced self, role and reason for call. Ms. Joaquin Zepeda is alert and oriented and engaged in conversation. The patient is a 67year old woman who resides alone in the community. She uses a walker to assist with ambulation. MS Perry shared that the [address] [place] [address] came out to complete screening on 10/7/2022 as planned.  She mentioned that she was informed that it'll take 3 weeks for results. Ms. Amena Monte informed MSW that she was given a list of PCA agencies and she has already reached out to one of them. She is waiting for the results later in order to initiate service with the agency. She expressed her appreciation for the assistance. She had no issues or concerns to report. MSW will follow to assist the patient as needed.

## 2022-10-24 RX ORDER — CIMETIDINE 400 MG/1
TABLET, FILM COATED ORAL
Qty: 60 TABLET | Refills: 4 | Status: SHIPPED | OUTPATIENT
Start: 2022-10-24

## 2022-10-31 ENCOUNTER — PATIENT OUTREACH (OUTPATIENT)
Dept: CASE MANAGEMENT | Age: 72
End: 2022-10-31

## 2022-10-31 NOTE — PROGRESS NOTES
Social Work Note  10/31/2022    Date of referral: 8/29/2022  Referral received from: RAMAKRISHNA John RN  Reason for referral: Assist the patient with obtaining PCA and assist with ACP process and completion     Previous SW Referral: No    If yes, brief summary and outcome:  None     Two Identifiers Verified: Yes     Insurance Provider: [facility] HMO, Medicaid of [address] [place] [address]: Community Provider       Status: N/A     Community Providers: Medicaid Long-Term Care      ADL Assistance: Bathing and Dressing     Housing/Living Concerns or Home Modification Needs: None mentioned     Transportation Concern: None     Medication Cost Concern: None     Medication Education or Adherence Concern: None     Financial Concern(s): None     Income (only if applicable): N/A      Ability to Read/Write: Yes     Advance Care Plan:  Not on file; education provided     Other: N/A     Identified Needs:      Patient seeking PCA assistance  Patient interested in the ACP process and completion     Social Work Plan:     Assist the patient with contacting the [address] to request screening for level of care. (Completed)  Assist the patient as needed to locate an agency to staff her care  Educate the patient on the process and completion of ACP. (Completed)  Mail ACP packet to the patient's home address (Completed)  Confirm receipt of packet (Completed)  Assist the patient , if necessary to complete AMD document. Method of Communication with Provider (if appropriate): chart routing      MSW contacted the patient and introduced self, role and reason for call. Ms. Michael Shine is alert and oriented and engaged in conversation. The patient is a 67year old woman who resides alone in the community. She uses a walker to assist with ambulation. Ms. Michael Shine reported that she was approved to receive a personal care aid and she have already reached out to the agency of her choice.  She shared that someone from the agency will be stopping by today to  a form in order to initiate service. MSW asked the patient if she needs assistance with completing AMD and she mentioned that she have already started completing the document and is waiting for her sister who is her agent to enter her information. MSW asked if she had 2 witnesses to sign and the patient expressed her appreciation for information regarding witnesses. She informed MSW that she will be sure to get two witnesses other than herself and her agents to sign. The patient had no other issues or concerns to report. She was appreciative for the assistance received. MSW informed patient that there will be no further follow up unless she needed assistance in other areas. The patient verbalized understanding and requested that MSW continue to call to check on her. MSW will follow to assist as needed.

## 2022-10-31 NOTE — PROGRESS NOTES
Complex Case Management      Date/Time:  10/31/2022 9:51 AM    Method of communication with patient:phone    LPN Care Coordinator(LPN CC) contacted the patient by telephone to perform Ambulatory Care Coordination. Verified name and  (PHI) with patient as identifiers. Provided introduction to self, and explanation of the LPN CC's role. Reviewed most recent clinic visit w/ patient who verbalized understanding. Patient given an opportunity to ask questions. Top Challenges reviewed with the Provider   n/a       The patient agrees to contact the PCP office or the LPN CC for questions related to their healthcare. Provided contact information for future reference. Disease Specific:   COPD    Home Health Active: No    DME Active: No    Barriers to care?  none       Current Outpatient Medications   Medication Sig    cimetidine (TAGAMET) 400 mg tablet TAKE 1 TABLET BY MOUTH TWO (2) TIMES A DAY. FeroSuL 325 mg (65 mg iron) tablet TAKE 1 TABLET BY MOUTH EVERY DAY BEFORE BREAKFAST    simvastatin (ZOCOR) 20 mg tablet TAKE 1 TABLET BY MOUTH DAILY AT BEDTIME    vitamin T-E-U-lutein-minerals (OCUVITE) tablet Take 1 Tablet by mouth daily. hydrOXYzine HCL (ATARAX) 25 mg tablet Take 1 Tablet by mouth three (3) times daily as needed for Itching. Indications: itching    sodium hypochlorite (Dakin's Solution) 0.125 % soln external solution Apply daily to infected skin. Indications: skin disinfection    white petrolatum (VASELINE) topical onitment Apply  to affected area two (2) times a day. Wash right leg with clear water, pat dry and apply Vaseline    telmisartan-hydroCHLOROthiazide (MICARDIS HCT) 40-12.5 mg per tablet TAKE 1 TABLET BY MOUTH DAILY. furosemide (LASIX) 40 mg tablet Take 1 Tablet by mouth daily. Indications: visible water retention    gabapentin (NEURONTIN) 100 mg capsule Take 1 Capsule by mouth two (2) times a day. Max Daily Amount: 200 mg.  Indications: pruritis    azelastine (ASTELIN) 137 mcg (0.1 %) nasal spray 1 [place] by Both Nostrils route two (2) times a day. Use in each nostril as directed  Indications: Vasomotor Rhinitis     No current facility-administered medications for this visit. [facility] follow up appointment(s): No future appointments. Non-[facility] follow up appointment(s): n/a     Goals Addressed                   This Visit's Progress     Understands red flags post ED discharge   On track     Patient will take antibiotics as prescribed  Elevate leg to decrease swelling  Follow directions to cleanse leg with clear water twice a day and apply Vaseline  Notify physician if:  Increased pain, swelling, redness or warmth  Pus begins draining from leg  Rash develops  Temp > 101.5    9/12/22  Care to leg changed: Cleanse with water. Apply 1/4 st. Dakins Solution twice a day.

## 2022-11-04 ENCOUNTER — TELEPHONE (OUTPATIENT)
Dept: INTERNAL MEDICINE CLINIC | Age: 72
End: 2022-11-04

## 2022-11-04 NOTE — TELEPHONE ENCOUNTER
----- Message from Beatris Ortega sent at 11/4/2022 11:38 AM EDT -----  Subject: Referral Request    Reason for referral request? Pt would like to have PCP put orders in her   chart after appt on 10/18. Las include: metabolic and lipid panels. Please   contact once orders are in chart to and to schedule appt for labs. Provider patient wants to be referred to(if known):     Provider Phone Number(if known):     Additional Information for Provider?   ---------------------------------------------------------------------------  --------------  Corie DENNIS    [phone]; OK to leave message on voicemail  ---------------------------------------------------------------------------  --------------

## 2022-11-14 ENCOUNTER — TELEPHONE (OUTPATIENT)
Dept: INTERNAL MEDICINE CLINIC | Age: 72
End: 2022-11-14

## 2022-11-14 ENCOUNTER — PATIENT OUTREACH (OUTPATIENT)
Dept: CASE MANAGEMENT | Age: 72
End: 2022-11-14

## 2022-11-14 NOTE — PROGRESS NOTES
Complex Case Management      Date/Time:  2022 10:39 AM    Method of communication with patient:phone    [facility] ([facility]) contacted the patient by telephone to perform Ambulatory Care Coordination. Verified name and  (PHI) with patient as identifiers. Provided introduction to self, and explanation of the Ambulatory Care Manager's role. Reviewed most recent clinic visit w/ patient who verbalized understanding. Patient given an opportunity to ask questions. Top Challenges reviewed with the Provider   Patient states she was seen by GI for anemia. They are asking for lab work. GI will fax order to pcp's office  Patient unsure if she has order from GI. She can't see paperwork well that she was given. Patient told  she would have have blood work done at [facility]. The patient agrees to contact the PCP office or the LPN CC for questions related to their healthcare. Provided contact information for future reference. Disease Specific:   COPD    Home Health Active: No    DME Active: No    Barriers to care?  none              Current Outpatient Medications   Medication Sig    cimetidine (TAGAMET) 400 mg tablet TAKE 1 TABLET BY MOUTH TWO (2) TIMES A DAY. FeroSuL 325 mg (65 mg iron) tablet TAKE 1 TABLET BY MOUTH EVERY DAY BEFORE BREAKFAST    simvastatin (ZOCOR) 20 mg tablet TAKE 1 TABLET BY MOUTH DAILY AT BEDTIME    vitamin A-Z-K-lutein-minerals (OCUVITE) tablet Take 1 Tablet by mouth daily. hydrOXYzine HCL (ATARAX) 25 mg tablet Take 1 Tablet by mouth three (3) times daily as needed for Itching. Indications: itching    sodium hypochlorite (Dakin's Solution) 0.125 % soln external solution Apply daily to infected skin. Indications: skin disinfection    white petrolatum (VASELINE) topical onitment Apply  to affected area two (2) times a day.  Wash right leg with clear water, pat dry and apply Vaseline    telmisartan-hydroCHLOROthiazide (MICARDIS HCT) 40-12.5 mg per tablet TAKE 1 TABLET BY MOUTH DAILY. furosemide (LASIX) 40 mg tablet Take 1 Tablet by mouth daily. Indications: visible water retention    gabapentin (NEURONTIN) 100 mg capsule Take 1 Capsule by mouth two (2) times a day. Max Daily Amount: 200 mg. Indications: pruritis    azelastine (ASTELIN) 137 mcg (0.1 %) nasal spray 1 [place] by Both Nostrils route two (2) times a day. Use in each nostril as directed  Indications: Vasomotor Rhinitis     No current facility-administered medications for this visit. [facility] follow up appointment(s): No future appointments. Non-[facility] follow up appointment(s): n/a     Goals Addressed                   This Visit's Progress     Understands red flags post ED discharge   On track     Patient will take antibiotics as prescribed  Elevate leg to decrease swelling  Follow directions to cleanse leg with clear water twice a day and apply Vaseline  Notify physician if:  Increased pain, swelling, redness or warmth  Pus begins draining from leg  Rash develops  Temp > 101.5    9/12/22  Care to leg changed: Cleanse with water. Apply 1/4 st. Dakins Solution twice a day.

## 2022-11-14 NOTE — TELEPHONE ENCOUNTER
Called pt and left message. Call back number left. The call was to inform pt of the PCP's note, we do not draw lab for outside physicians.  We are not a draw site per Dr. Joseph Lopez.

## 2022-11-14 NOTE — Clinical Note
Patient was seen by GI. GI is asking for blood work to be done. Patient would like to have blood work done at [facility] office. Patient isn't sure if she has order, she can't see paperwork well. I spoke with Dr. Sivan Phillips office. They will fax order to pcp's office.

## 2022-11-15 ENCOUNTER — PATIENT OUTREACH (OUTPATIENT)
Dept: CASE MANAGEMENT | Age: 72
End: 2022-11-15

## 2022-11-15 NOTE — PROGRESS NOTES
Complex Case Management      Date/Time:  11/15/2022 1:11 PM    Method of communication with patient:phone    LPN Care Coordinator (LPN CC) contacted the patient by telephone to perform Ambulatory Care Coordination. Verified name and  (PHI) with patient as identifiers. Provided introduction to self, and explanation of the LPN CC's role. Reviewed most recent clinic visit w/ patient who verbalized understanding. Patient given an opportunity to ask questions. Top Challenges reviewed with the Provider   N/a     Spoke with patient. Patient made aware that she will have to go to an outpatient lab to have labs for GI drawn. She can't have them drawn at her pcp office. Patient verbalized understanding. The patient agrees to contact the PCP office or the LPN CC for questions related to their healthcare. Provided contact information for future reference. Disease Specific:   N/A    Home Health Active: No    DME Active: No    Barriers to care?  none         Current Outpatient Medications   Medication Sig    cimetidine (TAGAMET) 400 mg tablet TAKE 1 TABLET BY MOUTH TWO (2) TIMES A DAY. FeroSuL 325 mg (65 mg iron) tablet TAKE 1 TABLET BY MOUTH EVERY DAY BEFORE BREAKFAST    simvastatin (ZOCOR) 20 mg tablet TAKE 1 TABLET BY MOUTH DAILY AT BEDTIME    vitamin I-R-P-lutein-minerals (OCUVITE) tablet Take 1 Tablet by mouth daily. hydrOXYzine HCL (ATARAX) 25 mg tablet Take 1 Tablet by mouth three (3) times daily as needed for Itching. Indications: itching    sodium hypochlorite (Dakin's Solution) 0.125 % soln external solution Apply daily to infected skin. Indications: skin disinfection    white petrolatum (VASELINE) topical onitment Apply  to affected area two (2) times a day. Wash right leg with clear water, pat dry and apply Vaseline    telmisartan-hydroCHLOROthiazide (MICARDIS HCT) 40-12.5 mg per tablet TAKE 1 TABLET BY MOUTH DAILY. furosemide (LASIX) 40 mg tablet Take 1 Tablet by mouth daily.  Indications: visible water retention    gabapentin (NEURONTIN) 100 mg capsule Take 1 Capsule by mouth two (2) times a day. Max Daily Amount: 200 mg. Indications: pruritis    azelastine (ASTELIN) 137 mcg (0.1 %) nasal spray 1 [place] by Both Nostrils route two (2) times a day. Use in each nostril as directed  Indications: Vasomotor Rhinitis     No current facility-administered medications for this visit. [facility] follow up appointment(s): No future appointments.      Non-[facility] follow up appointment(s): n/a

## 2022-11-16 ENCOUNTER — PATIENT OUTREACH (OUTPATIENT)
Dept: CASE MANAGEMENT | Age: 72
End: 2022-11-16

## 2022-11-16 NOTE — PROGRESS NOTES
Social Work Note  11/16/2022    Date of referral: 8/29/2022  Referral received from: RAMAKRISHNA Marquez RN  Reason for referral: Assist the patient with obtaining PCA and assist with ACP process and completion     Previous SW Referral: No    If yes, brief summary and outcome:  None     Two Identifiers Verified: Yes     Insurance Provider: [facility] HMO, Medicaid of [address] [place] [address]: Community Provider      [place] Status: N/A     Community Providers: Medicaid Long-Term Care      ADL Assistance: Bathing and Dressing     Housing/Living Concerns or Home Modification Needs: None mentioned     Transportation Concern: None     Medication Cost Concern: None     Medication Education or Adherence Concern: None     Financial Concern(s): None     Income (only if applicable): N/A      Ability to Read/Write: Yes     Advance Care Plan:  Not on file; education provided     Other: N/A     Identified Needs:      Patient seeking PCA assistance  Patient interested in the ACP process and completion     Social Work Plan:     Assist the patient with contacting the [address] to request screening for level of care. (Completed)  Assist the patient as needed to locate an agency to staff her care  Educate the patient on the process and completion of ACP. (Completed)  Mail ACP packet to the patient's home address (Completed)  Confirm receipt of packet (Completed)  Assist the patient , if necessary to complete AMD document. Method of Communication with Provider (if appropriate): chart routing      MSW contacted the patient and introduced self, role and reason for call. Ms. Belem Deleon is alert and oriented and engaged in conversation. The patient is a 67year old woman who resides alone in the community. She uses a walker to assist with ambulation.    Ms. Belem Deleon shared that she has been receiving assistance from an aid, however the aid has been ill this week and the agency have not sent a replacement due to staffing. The patient expressed that she understands and she is doing well. The patient did not have any other issue or concern to report. MSW will follow to assist as needed.

## 2022-11-22 NOTE — PATIENT INSTRUCTIONS
Fainting: Care Instructions  Your Care Instructions    When you faint, or pass out, you lose consciousness for a short time. A brief drop in blood flow to the brain often causes it. When you fall or lie down, more blood flows to your brain and you regain consciousness. Emotional stress, pain, or overheating--especially if you have been standing--can make you faint. In these cases, fainting is usually not serious. But fainting can be a sign of a more serious problem. Your doctor may want you to have more tests to rule out other causes. The treatment you need depends on the reason why you fainted. The doctor has checked you carefully, but problems can develop later. If you notice any problems or new symptoms, get medical treatment right away. Follow-up care is a key part of your treatment and safety. Be sure to make and go to all appointments, and call your doctor if you are having problems. It's also a good idea to know your test results and keep a list of the medicines you take. How can you care for yourself at home? · Drink plenty of fluids to prevent dehydration. If you have kidney, heart, or liver disease and have to limit fluids, talk with your doctor before you increase your fluid intake. When should you call for help? Call 911 anytime you think you may need emergency care. For example, call if:    · You have symptoms of a heart problem. These may include:  ? Chest pain or pressure. ? Severe trouble breathing. ? A fast or irregular heartbeat. ? Lightheadedness or sudden weakness. ? Coughing up pink, foamy mucus. ? Passing out. After you call 911, the  may tell you to chew 1 adult-strength or 2 to 4 low-dose aspirin. Wait for an ambulance. Do not try to drive yourself.     · You have symptoms of a stroke. These may include:  ? Sudden numbness, tingling, weakness, or loss of movement in your face, arm, or leg, especially on only one side of your body. ? Sudden vision changes.   ? Sudden trouble speaking. ? Sudden confusion or trouble understanding simple statements. ? Sudden problems with walking or balance. ? A sudden, severe headache that is different from past headaches.     · You passed out (lost consciousness) again.    Watch closely for changes in your health, and be sure to contact your doctor if:    · You do not get better as expected. Where can you learn more? Go to http://bob-gilson.info/. Enter D954 in the search box to learn more about \"Fainting: Care Instructions. \"  Current as of: September 23, 2018  Content Version: 11.9  © 9628-9930 [employer]. Care instructions adapted under license by [employer] (which disclaims liability or warranty for this information). If you have questions about a medical condition or this instruction, always ask your healthcare professional. [address] any warranty or liability for your use of this information. 15

## 2022-11-25 DIAGNOSIS — Z76.0 MEDICATION REFILL: ICD-10-CM

## 2022-11-25 RX ORDER — TELMISARTAN AND HYDROCHLORTHIAZIDE 40; 12.5 MG/1; MG/1
TABLET ORAL
Qty: 90 TABLET | Refills: 1 | Status: SHIPPED | OUTPATIENT
Start: 2022-11-25

## 2022-11-28 ENCOUNTER — PATIENT OUTREACH (OUTPATIENT)
Dept: CASE MANAGEMENT | Age: 72
End: 2022-11-28

## 2022-11-28 NOTE — PROGRESS NOTES
Patient has graduated from the Complex Case Management  program on 11/28/2022. Patient/family has the ability to self-manage at this time. Care management goals have been completed. No further LPN CCfollow up scheduled. Goals Addressed                   This Visit's Progress     COMPLETED: Understands red flags post ED discharge        Patient will take antibiotics as prescribed  Elevate leg to decrease swelling  Follow directions to cleanse leg with clear water twice a day and apply Vaseline  Notify physician if:  Increased pain, swelling, redness or warmth  Pus begins draining from leg  Rash develops  Temp > 101.5    9/12/22  Care to leg changed: Cleanse with water. Apply 1/4 st. Dakins Solution twice a day. Patient has LPN CC's contact information for any further questions, concerns, or needs. Patients upcoming visits:  No future appointments.

## 2022-12-06 ENCOUNTER — PATIENT OUTREACH (OUTPATIENT)
Dept: CASE MANAGEMENT | Age: 72
End: 2022-12-06

## 2022-12-06 NOTE — PROGRESS NOTES
Social Work Note  12/6/2022    Date of referral: 8/29/2022  Referral received from: RAMAKRISHNA Berger RN  Reason for referral: Assist the patient with obtaining PCA and assist with ACP process and completion     Previous SW Referral: No    If yes, brief summary and outcome:  None     Two Identifiers Verified: Yes     Insurance Provider: [facility] HMO, Medicaid of [address] [place] [address]: Community Provider       Status: N/A     Community Providers: Medicaid Long-Term Care      ADL Assistance: Bathing and Dressing     Housing/Living Concerns or Home Modification Needs: None mentioned     Transportation Concern: None     Medication Cost Concern: None     Medication Education or Adherence Concern: None     Financial Concern(s): None     Income (only if applicable): N/A      Ability to Read/Write: Yes     Advance Care Plan:  Not on file; education provided     Other: N/A     Identified Needs:      Patient seeking PCA assistance  Patient interested in the ACP process and completion     Social Work Plan:     Assist the patient with contacting the [address] to request screening for level of care. (Completed)  Assist the patient as needed to locate an agency to staff her care  Educate the patient on the process and completion of ACP. (Completed)  Mail ACP packet to the patient's home address (Completed)  Confirm receipt of packet (Completed)  Assist the patient , if necessary to complete AMD document. (Completed)      Method of Communication with Provider (if appropriate): chart routing     Call received from Ms. Perry who is alert and oriented. MSW introduced self, and role. The patient stated that she is doing well and everything is going well so far. She mentioned that she has an aid who has been coming daily to assist her with her ADLs, IADLs. She shared that she has the AMD form sent to her. She stated that she is not ready to move forward with document process at this time.  She had no other issues or concerns to report. There are no other needs identified. No further follow up will be required. MSW will be available to assist with any future needs.

## 2022-12-12 DIAGNOSIS — L08.9 SKIN INFECTION: ICD-10-CM

## 2022-12-12 RX ORDER — DOXYCYCLINE 100 MG/1
100 CAPSULE ORAL 2 TIMES DAILY
Qty: 20 CAPSULE | Refills: 0 | Status: SHIPPED | OUTPATIENT
Start: 2022-12-12 | End: 2022-12-22

## 2023-01-10 RX ORDER — FERROUS SULFATE TAB 325 MG (65 MG ELEMENTAL FE) 325 (65 FE) MG
TAB ORAL
Qty: 30 TABLET | Refills: 1 | Status: SHIPPED | OUTPATIENT
Start: 2023-01-10

## 2023-03-14 RX ORDER — FERROUS SULFATE 325(65) MG
TABLET ORAL
Qty: 30 TABLET | Refills: 0 | OUTPATIENT
Start: 2023-03-14

## 2023-03-14 RX ORDER — SIMVASTATIN 20 MG
TABLET ORAL
Qty: 30 TABLET | Refills: 3 | OUTPATIENT
Start: 2023-03-14

## 2023-03-16 RX ORDER — SIMVASTATIN 20 MG
TABLET ORAL
Qty: 30 TABLET | Refills: 3 | Status: SHIPPED | OUTPATIENT
Start: 2023-03-16

## 2023-03-16 RX ORDER — FERROUS SULFATE 325(65) MG
TABLET ORAL
Qty: 30 TABLET | Refills: 0 | Status: SHIPPED | OUTPATIENT
Start: 2023-03-16

## 2023-03-16 NOTE — TELEPHONE ENCOUNTER
Will renew for pt today as Dr Anne Luke is out of office.  Will need to fu wit PCP for future refills

## 2023-03-16 NOTE — TELEPHONE ENCOUNTER
Pt advised a follow up appt is overdue. Pt is scheduled for 04/18/23. Pt states she is out of her medication. Will notify.

## 2023-04-11 RX ORDER — TELMISARTAN AND HYDROCHLORTHIAZIDE 40; 12.5 MG/1; MG/1
TABLET ORAL
Qty: 90 TABLET | Refills: 1 | Status: SHIPPED | OUTPATIENT
Start: 2023-04-11

## 2023-04-11 RX ORDER — FERROUS SULFATE 325(65) MG
TABLET ORAL
Qty: 30 TABLET | Refills: 5 | Status: SHIPPED | OUTPATIENT
Start: 2023-04-11

## 2023-04-11 RX ORDER — CIMETIDINE 400 MG/1
TABLET, FILM COATED ORAL
Qty: 60 TABLET | Refills: 5 | Status: SHIPPED | OUTPATIENT
Start: 2023-04-11

## 2023-04-18 ENCOUNTER — OFFICE VISIT (OUTPATIENT)
Facility: CLINIC | Age: 73
End: 2023-04-18
Payer: MEDICARE

## 2023-04-18 VITALS
TEMPERATURE: 97 F | RESPIRATION RATE: 18 BRPM | SYSTOLIC BLOOD PRESSURE: 149 MMHG | HEIGHT: 61 IN | WEIGHT: 225 LBS | DIASTOLIC BLOOD PRESSURE: 80 MMHG | BODY MASS INDEX: 42.48 KG/M2 | HEART RATE: 96 BPM | OXYGEN SATURATION: 100 %

## 2023-04-18 DIAGNOSIS — Z00.00 MEDICARE ANNUAL WELLNESS VISIT, SUBSEQUENT: Primary | ICD-10-CM

## 2023-04-18 DIAGNOSIS — E66.01 OBESITY, CLASS III, BMI 40-49.9 (MORBID OBESITY) (HCC): ICD-10-CM

## 2023-04-18 DIAGNOSIS — I89.0 LYMPHEDEMA, NOT ELSEWHERE CLASSIFIED: ICD-10-CM

## 2023-04-18 DIAGNOSIS — I73.9 PERIPHERAL VASCULAR DISEASE, UNSPECIFIED (HCC): ICD-10-CM

## 2023-04-18 DIAGNOSIS — N18.4 CHRONIC KIDNEY DISEASE, STAGE 4 (SEVERE) (HCC): ICD-10-CM

## 2023-04-18 DIAGNOSIS — I87.2 VENOUS STASIS DERMATITIS OF BOTH LOWER EXTREMITIES: ICD-10-CM

## 2023-04-18 DIAGNOSIS — I10 ESSENTIAL (PRIMARY) HYPERTENSION: ICD-10-CM

## 2023-04-18 DIAGNOSIS — D64.9 ANEMIA, UNSPECIFIED TYPE: ICD-10-CM

## 2023-04-18 PROCEDURE — 99214 OFFICE O/P EST MOD 30 MIN: CPT | Performed by: INTERNAL MEDICINE

## 2023-04-18 PROCEDURE — G0439 PPPS, SUBSEQ VISIT: HCPCS | Performed by: INTERNAL MEDICINE

## 2023-04-18 PROCEDURE — 3017F COLORECTAL CA SCREEN DOC REV: CPT | Performed by: INTERNAL MEDICINE

## 2023-04-18 PROCEDURE — G8417 CALC BMI ABV UP PARAM F/U: HCPCS | Performed by: INTERNAL MEDICINE

## 2023-04-18 PROCEDURE — 3078F DIAST BP <80 MM HG: CPT | Performed by: INTERNAL MEDICINE

## 2023-04-18 PROCEDURE — 1123F ACP DISCUSS/DSCN MKR DOCD: CPT | Performed by: INTERNAL MEDICINE

## 2023-04-18 PROCEDURE — G8400 PT W/DXA NO RESULTS DOC: HCPCS | Performed by: INTERNAL MEDICINE

## 2023-04-18 PROCEDURE — 3077F SYST BP >= 140 MM HG: CPT | Performed by: INTERNAL MEDICINE

## 2023-04-18 PROCEDURE — 1090F PRES/ABSN URINE INCON ASSESS: CPT | Performed by: INTERNAL MEDICINE

## 2023-04-18 PROCEDURE — 1036F TOBACCO NON-USER: CPT | Performed by: INTERNAL MEDICINE

## 2023-04-18 PROCEDURE — G8427 DOCREV CUR MEDS BY ELIG CLIN: HCPCS | Performed by: INTERNAL MEDICINE

## 2023-04-18 SDOH — ECONOMIC STABILITY: FOOD INSECURITY: WITHIN THE PAST 12 MONTHS, THE FOOD YOU BOUGHT JUST DIDN'T LAST AND YOU DIDN'T HAVE MONEY TO GET MORE.: NEVER TRUE

## 2023-04-18 SDOH — ECONOMIC STABILITY: FOOD INSECURITY: WITHIN THE PAST 12 MONTHS, YOU WORRIED THAT YOUR FOOD WOULD RUN OUT BEFORE YOU GOT MONEY TO BUY MORE.: NEVER TRUE

## 2023-04-18 SDOH — ECONOMIC STABILITY: HOUSING INSECURITY
IN THE LAST 12 MONTHS, WAS THERE A TIME WHEN YOU DID NOT HAVE A STEADY PLACE TO SLEEP OR SLEPT IN A SHELTER (INCLUDING NOW)?: NO

## 2023-04-18 SDOH — ECONOMIC STABILITY: INCOME INSECURITY: HOW HARD IS IT FOR YOU TO PAY FOR THE VERY BASICS LIKE FOOD, HOUSING, MEDICAL CARE, AND HEATING?: NOT HARD AT ALL

## 2023-04-18 ASSESSMENT — LIFESTYLE VARIABLES
HOW MANY STANDARD DRINKS CONTAINING ALCOHOL DO YOU HAVE ON A TYPICAL DAY: PATIENT DOES NOT DRINK
HOW OFTEN DO YOU HAVE A DRINK CONTAINING ALCOHOL: NEVER

## 2023-04-18 ASSESSMENT — PATIENT HEALTH QUESTIONNAIRE - PHQ9
SUM OF ALL RESPONSES TO PHQ QUESTIONS 1-9: 0
SUM OF ALL RESPONSES TO PHQ QUESTIONS 1-9: 0
2. FEELING DOWN, DEPRESSED OR HOPELESS: 0
SUM OF ALL RESPONSES TO PHQ QUESTIONS 1-9: 0
SUM OF ALL RESPONSES TO PHQ9 QUESTIONS 1 & 2: 0
1. LITTLE INTEREST OR PLEASURE IN DOING THINGS: 0
SUM OF ALL RESPONSES TO PHQ QUESTIONS 1-9: 0

## 2023-04-18 NOTE — PATIENT INSTRUCTIONS
some cases, you may need to do the brushing and other dental care tasks. People who have trouble using their hands or who have dementia may need this extra help. How can you help with dental care? Normal dental care  To keep the teeth and gums healthy:  Brush the teeth with fluoride toothpaste twice a day--in the morning and at night--and floss at least once a day. Plaque can quickly build up on the teeth of older adults. Watch for the signs of gum disease. These signs include gums that bleed after brushing or after eating hard foods, such as apples. See a dentist regularly. Many experts recommend checkups every 6 months. Keep the dentist up to date on any new medications the person is taking. Encourage a balanced diet that includes whole grains, vegetables, and fruits, and that is low in saturated fat and sodium. Encourage the person you're caring for not to use tobacco products. They can affect dental and general health. Many older adults have a fixed income and feel that they can't afford dental care. But most [facility] and [facility] have programs in which dentists help older adults by lowering fees. Contact your area's public health offices or  for information about dental care in your area. Using a toothbrush  Older adults with arthritis sometimes have trouble brushing their teeth because they can't easily hold the toothbrush. Their hands and fingers may be stiff, painful, or weak. If this is the case, you can: Offer an electric toothbrush. Enlarge the handle of a non-electric toothbrush by wrapping a sponge, an elastic bandage, or adhesive tape around it. Push the toothbrush handle through a ball made of rubber or soft foam.  Make the handle longer and thicker by taping Popsicle sticks or tongue depressors to it. You may also be able to buy special toothbrushes, toothpaste dispensers, and floss holders.   Your doctor may recommend a soft-bristle toothbrush if the person you care for bleeds

## 2023-04-18 NOTE — PROGRESS NOTES
weeping today. Some edema   Neurological:      General: No focal deficit present. Mental Status: She is alert and oriented to person, place, and time. Psychiatric:         Mood and Affect: Mood normal.         Behavior: Behavior normal.       ASSESSMENT and PLAN      ICD-10-CM             2. Essential (primary) hypertension  I10 Comprehensive Metabolic Panel     Lipid Panel     Urinalysis with Microscopic      3. Peripheral vascular disease, unspecified ([address]  I73.9       4. Obesity, Class III, BMI 40-49.9 (morbid obesity) ([facility])  E66.01       5. Chronic kidney disease, stage 4 (severe) ([facility])  N18.4       6. Lymphedema, not elsewhere classified  I89.0       7. Venous stasis dermatitis of both lower extremities  I87.2       8. Anemia, unspecified type  D64.9 CBC with Auto Differential           BP up a little but coming down with rest.    CKD. Has not seen Dr. Rohit Villarreal in a while. Recheck status today    Anemia is more likely due to the CKD    Stasis dermatitis is a little better. She looks out for odors and will sometimes wash with bleach. Suggested she try an antibacterial soap as well    Update lab    continue current meds    F/u 6 months for BP, chol.  Kidney function
Consulting Physician  Everton Palmer MD as Consulting Physician     Reviewed and updated this visit:  Tobacco  Allergies  Meds  Problems  Med Hx  Surg Hx  Soc Hx  Fam Hx               Lawrence Guajardo MD

## 2023-04-19 LAB
ALBUMIN SERPL-MCNC: 4.3 G/DL (ref 3.7–4.7)
ALBUMIN/GLOB SERPL: 1.2 {RATIO} (ref 1.2–2.2)
ALP SERPL-CCNC: 96 IU/L (ref 44–121)
ALT SERPL-CCNC: 5 IU/L (ref 0–32)
APPEARANCE UR: CLEAR
AST SERPL-CCNC: 15 IU/L (ref 0–40)
BASOPHILS # BLD AUTO: 0.1 X10E3/UL (ref 0–0.2)
BASOPHILS NFR BLD AUTO: 1 %
BILIRUB SERPL-MCNC: 0.2 MG/DL (ref 0–1.2)
BILIRUB UR QL STRIP: NEGATIVE
BUN SERPL-MCNC: 28 MG/DL (ref 8–27)
BUN/CREAT SERPL: 12 (ref 12–28)
CALCIUM SERPL-MCNC: 9.3 MG/DL (ref 8.7–10.3)
CHLORIDE SERPL-SCNC: 104 MMOL/L (ref 96–106)
CHOLEST SERPL-MCNC: 197 MG/DL (ref 100–199)
CO2 SERPL-SCNC: 20 MMOL/L (ref 20–29)
COLOR UR: YELLOW
CREAT SERPL-MCNC: 2.35 MG/DL (ref 0.57–1)
EGFRCR SERPLBLD CKD-EPI 2021: 21 ML/MIN/1.73
EOSINOPHIL # BLD AUTO: 0.2 X10E3/UL (ref 0–0.4)
EOSINOPHIL NFR BLD AUTO: 2 %
ERYTHROCYTE [DISTWIDTH] IN BLOOD BY AUTOMATED COUNT: 15.1 % (ref 11.7–15.4)
GLOBULIN SER CALC-MCNC: 3.5 G/DL (ref 1.5–4.5)
GLUCOSE SERPL-MCNC: 90 MG/DL (ref 70–99)
GLUCOSE UR QL STRIP: NEGATIVE
HCT VFR BLD AUTO: 29.7 % (ref 34–46.6)
HDLC SERPL-MCNC: 60 MG/DL
HGB BLD-MCNC: 9.5 G/DL (ref 11.1–15.9)
HGB UR QL STRIP: NEGATIVE
IMM GRANULOCYTES # BLD AUTO: 0 X10E3/UL (ref 0–0.1)
IMM GRANULOCYTES NFR BLD AUTO: 0 %
KETONES UR QL STRIP: NEGATIVE
LDLC SERPL CALC-MCNC: 120 MG/DL (ref 0–99)
LEUKOCYTE ESTERASE UR QL STRIP: NEGATIVE
LYMPHOCYTES # BLD AUTO: 1.6 X10E3/UL (ref 0.7–3.1)
LYMPHOCYTES NFR BLD AUTO: 23 %
MCH RBC QN AUTO: 27.3 PG (ref 26.6–33)
MCHC RBC AUTO-ENTMCNC: 32 G/DL (ref 31.5–35.7)
MCV RBC AUTO: 85 FL (ref 79–97)
MICRO URNS: NORMAL
MONOCYTES # BLD AUTO: 0.5 X10E3/UL (ref 0.1–0.9)
MONOCYTES NFR BLD AUTO: 8 %
NEUTROPHILS # BLD AUTO: 4.6 X10E3/UL (ref 1.4–7)
NEUTROPHILS NFR BLD AUTO: 66 %
NITRITE UR QL STRIP: NEGATIVE
PH UR STRIP: 6 [PH] (ref 5–7.5)
PLATELET # BLD AUTO: 406 X10E3/UL (ref 150–450)
POTASSIUM SERPL-SCNC: 4.7 MMOL/L (ref 3.5–5.2)
PROT SERPL-MCNC: 7.8 G/DL (ref 6–8.5)
PROT UR QL STRIP: NEGATIVE
RBC # BLD AUTO: 3.48 X10E6/UL (ref 3.77–5.28)
SODIUM SERPL-SCNC: 142 MMOL/L (ref 134–144)
SP GR UR STRIP: 1.01 (ref 1–1.03)
SPECIMEN STATUS REPORT: NORMAL
TRIGL SERPL-MCNC: 93 MG/DL (ref 0–149)
UROBILINOGEN UR STRIP-MCNC: 0.2 MG/DL (ref 0.2–1)
VLDLC SERPL CALC-MCNC: 17 MG/DL (ref 5–40)
WBC # BLD AUTO: 7 X10E3/UL (ref 3.4–10.8)

## 2023-05-08 ENCOUNTER — HOSPITAL ENCOUNTER (OUTPATIENT)
Dept: WOMENS IMAGING | Facility: HOSPITAL | Age: 73
Discharge: HOME | End: 2023-05-11
Payer: MEDICARE

## 2023-05-08 DIAGNOSIS — Z12.31 VISIT FOR SCREENING MAMMOGRAM: ICD-10-CM

## 2023-05-08 PROCEDURE — 77063 BREAST TOMOSYNTHESIS BI: CPT

## 2023-07-14 ENCOUNTER — TELEPHONE (OUTPATIENT)
Facility: CLINIC | Age: 73
End: 2023-07-14

## 2023-07-14 DIAGNOSIS — Z91.81 AT RISK FOR FALLING: ICD-10-CM

## 2023-07-14 DIAGNOSIS — R26.89 IMPAIRED GAIT AND MOBILITY: ICD-10-CM

## 2023-07-14 DIAGNOSIS — I89.0 LYMPHEDEMA, NOT ELSEWHERE CLASSIFIED: Primary | ICD-10-CM

## 2023-07-14 DIAGNOSIS — I73.9 PERIPHERAL VASCULAR DISEASE, UNSPECIFIED (HCC): ICD-10-CM

## 2023-07-14 RX ORDER — CIMETIDINE 400 MG/1
TABLET, FILM COATED ORAL
Qty: 60 TABLET | Refills: 5 | Status: SHIPPED | OUTPATIENT
Start: 2023-07-14

## 2023-07-14 NOTE — PROGRESS NOTES
Orders Placed This Encounter    cimetidine (TAGAMET) 400 MG tablet     Sig: TAKE 1 TABLET BY MOUTH TWO (2) TIMES A DAY.      Dispense:  60 tablet     Refill:  5     refills please  04/11/2023 9:33:40 AM

## 2023-07-17 NOTE — TELEPHONE ENCOUNTER
Orders Placed This Encounter    DME Order for (Specify) as OP     - DME device ordered - upright rolling walker with seat  - Diagnosis: mobility and gait impaired, at risk for falls, peripheral vascular disease  - Length of Need: Lifetime

## 2023-07-17 NOTE — TELEPHONE ENCOUNTER
Spoke with pt in regards to walker. Two patient identifier's verified. Relayed the PCP's note. Pt states she has confirmed her insurance will cover the standing walking.  She believes this will help her because she is walking \"hunched down\"

## 2023-07-18 RX ORDER — SIMVASTATIN 20 MG
TABLET ORAL
Qty: 90 TABLET | Refills: 3 | Status: SHIPPED | OUTPATIENT
Start: 2023-07-18

## 2023-07-24 ENCOUNTER — TELEPHONE (OUTPATIENT)
Facility: CLINIC | Age: 73
End: 2023-07-24

## 2023-07-24 NOTE — TELEPHONE ENCOUNTER
Pharmacy faxed New prescription request.     Last ov 4/18/23, no future appts made. cimetidine (TAGAMET) 400 MG tablet [[id number]]     Order Details  Dose, Route, Frequency: As Directed   Dispense Quantity: 60 tablet Refills: 5    Note to Pharmacy: refills please  04/11/2023 9:33:40 AM         Sig: TAKE 1 TABLET BY MOUTH TWO (2) TIMES A DAY.

## 2023-08-29 ENCOUNTER — OFFICE VISIT (OUTPATIENT)
Facility: CLINIC | Age: 73
End: 2023-08-29
Payer: MEDICARE

## 2023-08-29 VITALS
HEART RATE: 98 BPM | RESPIRATION RATE: 15 BRPM | HEIGHT: 61 IN | DIASTOLIC BLOOD PRESSURE: 77 MMHG | BODY MASS INDEX: 40.59 KG/M2 | WEIGHT: 215 LBS | OXYGEN SATURATION: 98 % | SYSTOLIC BLOOD PRESSURE: 164 MMHG | TEMPERATURE: 97.1 F

## 2023-08-29 DIAGNOSIS — I87.2 VENOUS STASIS DERMATITIS OF BOTH LOWER EXTREMITIES: ICD-10-CM

## 2023-08-29 DIAGNOSIS — Z23 NEEDS FLU SHOT: ICD-10-CM

## 2023-08-29 DIAGNOSIS — I10 ESSENTIAL (PRIMARY) HYPERTENSION: Primary | ICD-10-CM

## 2023-08-29 DIAGNOSIS — D64.9 ANEMIA, UNSPECIFIED TYPE: ICD-10-CM

## 2023-08-29 DIAGNOSIS — N18.32 CHRONIC RENAL IMPAIRMENT, STAGE 3B (HCC): ICD-10-CM

## 2023-08-29 PROCEDURE — 1090F PRES/ABSN URINE INCON ASSESS: CPT | Performed by: INTERNAL MEDICINE

## 2023-08-29 PROCEDURE — 90694 VACC AIIV4 NO PRSRV 0.5ML IM: CPT | Performed by: INTERNAL MEDICINE

## 2023-08-29 PROCEDURE — G8427 DOCREV CUR MEDS BY ELIG CLIN: HCPCS | Performed by: INTERNAL MEDICINE

## 2023-08-29 PROCEDURE — 99214 OFFICE O/P EST MOD 30 MIN: CPT | Performed by: INTERNAL MEDICINE

## 2023-08-29 PROCEDURE — G8417 CALC BMI ABV UP PARAM F/U: HCPCS | Performed by: INTERNAL MEDICINE

## 2023-08-29 PROCEDURE — 1123F ACP DISCUSS/DSCN MKR DOCD: CPT | Performed by: INTERNAL MEDICINE

## 2023-08-29 PROCEDURE — 3077F SYST BP >= 140 MM HG: CPT | Performed by: INTERNAL MEDICINE

## 2023-08-29 PROCEDURE — 1036F TOBACCO NON-USER: CPT | Performed by: INTERNAL MEDICINE

## 2023-08-29 PROCEDURE — G0008 ADMIN INFLUENZA VIRUS VAC: HCPCS | Performed by: INTERNAL MEDICINE

## 2023-08-29 PROCEDURE — 3017F COLORECTAL CA SCREEN DOC REV: CPT | Performed by: INTERNAL MEDICINE

## 2023-08-29 PROCEDURE — G8399 PT W/DXA RESULTS DOCUMENT: HCPCS | Performed by: INTERNAL MEDICINE

## 2023-08-29 PROCEDURE — 3078F DIAST BP <80 MM HG: CPT | Performed by: INTERNAL MEDICINE

## 2023-08-29 RX ORDER — AMLODIPINE BESYLATE 5 MG/1
5 TABLET ORAL DAILY
Qty: 90 TABLET | Refills: 1 | Status: SHIPPED | OUTPATIENT
Start: 2023-08-29

## 2023-08-29 ASSESSMENT — ENCOUNTER SYMPTOMS
SHORTNESS OF BREATH: 0
CHEST TIGHTNESS: 0

## 2023-08-29 NOTE — PROGRESS NOTES
1. \"Have you been to the ER, urgent care clinic since your last visit? Hospitalized since your last visit? \" No    2. \"Have you seen or consulted any other health care providers outside of the [address] since your last visit? \" Yes [address]      3. For patients aged 43-73: Has the patient had a colonoscopy / FIT/ Cologuard? Yes - no Care Gap present      If the patient is female:    4. For patients aged 43-66: Has the patient had a mammogram within the past 2 years? Yes - no Care Gap present      5. For patients aged 21-65: Has the patient had a pap smear?  NA - based on age or sex

## 2023-08-29 NOTE — PATIENT INSTRUCTIONS
force. Over time, this can damage the heart and the walls of your arteries. But you can't feel it. High blood pressure usually doesn't cause symptoms. High blood pressure makes your heart work harder. And that can lead to heart failure, which means your heart doesn't pump as much blood as your body needs. Fat and calcium start to build up in your arteries. This buildup is called hardening of the arteries. It can cause many problems including a heart attack and stroke. Arteries also carry blood and oxygen to organs like your eyes, kidneys, and brain. If high blood pressure damages those arteries, it can lead to vision loss, kidney disease, stroke, and a higher risk of dementia. How can you prevent high blood pressure? Stay at a healthy weight. Try to limit how much sodium you eat to less than 2,300 milligrams (mg) a day. If you limit your sodium to 1,500 mg a day, you can lower your blood pressure even more. Buy foods that are labeled \"unsalted,\" \"sodium-free,\" or \"low-sodium. \" Foods labeled \"reduced-sodium\" and \"light sodium\" may still have too much sodium. Flavor your food with garlic, lemon juice, onion, vinegar, herbs, and spices instead of salt. Do not use soy sauce, steak sauce, onion salt, garlic salt, mustard, or ketchup on your food. Use less salt (or none) when recipes call for it. You can often use half the salt a recipe calls for without losing flavor. Be physically active. Get at least 30 minutes of exercise on most days of the week. Walking is a good choice. You also may want to do other activities, such as running, swimming, cycling, or playing tennis or team sports. Limit alcohol to 2 drinks a day for men and 1 drink a day for women. Eat plenty of fruits, vegetables, and low-fat dairy products. Eat less saturated and total fats. How is high blood pressure treated? Your doctor will suggest making lifestyle changes to help your heart.  For example, your doctor may ask you to eat healthy

## 2023-08-29 NOTE — PROGRESS NOTES
Gadiel Luis is a 68 y.o. female who presents for Influenza  immunization. she denies any symptoms , reactions or allergies that would exclude them from being immunized today. Risks and adverse reactions were discussed and the VIS was given to them. All questions were addressed. she was observed for 15 min post injection. There were no reactions observed.     Delfino Mayes LPN

## 2023-08-30 ENCOUNTER — CLINICAL DOCUMENTATION (OUTPATIENT)
Facility: CLINIC | Age: 73
End: 2023-08-30

## 2023-08-30 LAB
ALBUMIN SERPL-MCNC: 4.2 G/DL (ref 3.8–4.8)
ALBUMIN/GLOB SERPL: 1.2 {RATIO} (ref 1.2–2.2)
ALP SERPL-CCNC: 95 IU/L (ref 44–121)
ALT SERPL-CCNC: 6 IU/L (ref 0–32)
AST SERPL-CCNC: 13 IU/L (ref 0–40)
BASOPHILS # BLD AUTO: 0.1 X10E3/UL (ref 0–0.2)
BASOPHILS NFR BLD AUTO: 1 %
BILIRUB SERPL-MCNC: 0.2 MG/DL (ref 0–1.2)
BUN SERPL-MCNC: 24 MG/DL (ref 8–27)
BUN/CREAT SERPL: 10 (ref 12–28)
CALCIUM SERPL-MCNC: 9.1 MG/DL (ref 8.7–10.3)
CHLORIDE SERPL-SCNC: 107 MMOL/L (ref 96–106)
CHOLEST SERPL-MCNC: 164 MG/DL (ref 100–199)
CO2 SERPL-SCNC: 21 MMOL/L (ref 20–29)
CREAT SERPL-MCNC: 2.37 MG/DL (ref 0.57–1)
EGFRCR SERPLBLD CKD-EPI 2021: 21 ML/MIN/1.73
EOSINOPHIL # BLD AUTO: 0.3 X10E3/UL (ref 0–0.4)
EOSINOPHIL NFR BLD AUTO: 4 %
ERYTHROCYTE [DISTWIDTH] IN BLOOD BY AUTOMATED COUNT: 13.3 % (ref 11.7–15.4)
GLOBULIN SER CALC-MCNC: 3.6 G/DL (ref 1.5–4.5)
GLUCOSE SERPL-MCNC: 93 MG/DL (ref 70–99)
HCT VFR BLD AUTO: 30.7 % (ref 34–46.6)
HDLC SERPL-MCNC: 49 MG/DL
HGB BLD-MCNC: 9.8 G/DL (ref 11.1–15.9)
IMM GRANULOCYTES # BLD AUTO: 0 X10E3/UL (ref 0–0.1)
IMM GRANULOCYTES NFR BLD AUTO: 0 %
LDLC SERPL CALC-MCNC: 98 MG/DL (ref 0–99)
LYMPHOCYTES # BLD AUTO: 1.5 X10E3/UL (ref 0.7–3.1)
LYMPHOCYTES NFR BLD AUTO: 24 %
MCH RBC QN AUTO: 26.9 PG (ref 26.6–33)
MCHC RBC AUTO-ENTMCNC: 31.9 G/DL (ref 31.5–35.7)
MCV RBC AUTO: 84 FL (ref 79–97)
MONOCYTES # BLD AUTO: 0.5 X10E3/UL (ref 0.1–0.9)
MONOCYTES NFR BLD AUTO: 7 %
NEUTROPHILS # BLD AUTO: 3.8 X10E3/UL (ref 1.4–7)
NEUTROPHILS NFR BLD AUTO: 64 %
PLATELET # BLD AUTO: 436 X10E3/UL (ref 150–450)
POTASSIUM SERPL-SCNC: 4.7 MMOL/L (ref 3.5–5.2)
PROT SERPL-MCNC: 7.8 G/DL (ref 6–8.5)
RBC # BLD AUTO: 3.64 X10E6/UL (ref 3.77–5.28)
SODIUM SERPL-SCNC: 143 MMOL/L (ref 134–144)
SPECIMEN STATUS REPORT: NORMAL
TRIGL SERPL-MCNC: 94 MG/DL (ref 0–149)
VLDLC SERPL CALC-MCNC: 17 MG/DL (ref 5–40)
WBC # BLD AUTO: 6.1 X10E3/UL (ref 3.4–10.8)

## 2023-08-30 NOTE — PROGRESS NOTES
Gilda Peres [place]  1950  68 y.o.  [address] [facility] [address]        Past Medical History:   Diagnosis Date    Atrophic vaginitis     CKD (chronic kidney disease) stage 3, GFR 30-59 ml/min ([facility])     COVID-19 2021    Elephantiasis nostra verrucosa 2018    H/O bone density study 10/3/11    WNL    Hiatal hernia     Hypercholesterolemia     Internal hemorrhoid     Menopause     Mobility impaired     Obesity     PAD (peripheral artery disease) ([address] ) 2018    per [employer]    Pedal edema     Stasis dermatitis                Past Surgical History:   Procedure Laterality Date    COLONOSCOPY  2/10    10 yr f/u, Dr. Trupti Carrillo    COLONOSCOPY N/A 2019    COLONOSCOPY performed by Pia Cain MD at [address] [place] [address]      dental problems    UPPER GASTROINTESTINAL ENDOSCOPY  2019    EGD. Social History     Socioeconomic History    Marital status: Single     Spouse name: Not on file    Number of children: Not on file    Years of education: Not on file    Highest education level: Not on file   Occupational History    Not on file   Tobacco Use    Smoking status: Former     Packs/day: 0.01     Years: 1.00     Pack years: 0.01     Types: Cigarettes     Start date: 1964     Quit date: 1965     Years since quittin.6     Passive exposure: Never    Smokeless tobacco: Never   Vaping Use    Vaping Use: Never used   Substance and Sexual Activity    Alcohol use: No    Drug use: No    Sexual activity: Not Currently   Other Topics Concern    Not on file   Social History Narrative    Not on file     Social Determinants of Health     Financial Resource Strain: Low Risk     Difficulty of Paying Living Expenses: Not hard at all   Food Insecurity: No Food Insecurity    Worried About [place] in the Last Year: Never true    [address] in the Last Year: Never true   Transportation Needs: Unknown    Lack of Transportation (Medical):  Not on file    Lack of

## 2023-10-30 RX ORDER — CIMETIDINE 400 MG/1
TABLET, FILM COATED ORAL
Qty: 180 TABLET | Refills: 1 | Status: SHIPPED | OUTPATIENT
Start: 2023-10-30

## 2023-10-30 RX ORDER — TELMISARTAN AND HYDROCHLORTHIAZIDE 40; 12.5 MG/1; MG/1
TABLET ORAL
Qty: 90 TABLET | Refills: 1 | Status: SHIPPED | OUTPATIENT
Start: 2023-10-30

## 2023-12-18 ENCOUNTER — TELEPHONE (OUTPATIENT)
Facility: CLINIC | Age: 73
End: 2023-12-18

## 2023-12-18 NOTE — TELEPHONE ENCOUNTER
----- Message from Adrienneliliana Stubbshan sent at 12/18/2023  2:47 PM EST -----  Subject: Referral Request    Reason for referral request? Patient would like to get a referral to a   dentist. Please reach out to patient.  Provider patient wants to be referred to(if known):     Provider Phone Number(if known):    Additional Information for Provider?   ---------------------------------------------------------------------------  --------------  CALL BACK INFO    [phone]; OK to leave message on voicemail  ---------------------------------------------------------------------------  --------------

## 2024-01-15 NOTE — TELEPHONE ENCOUNTER
Called pt and left message. Call back number left .    The call was to inform pt the PCP typically does not write referral to Dentistry. It is encouraged for the pt to follow up with her insurance to find a Dental Provider in network. .

## 2024-01-17 ENCOUNTER — TELEPHONE (OUTPATIENT)
Facility: CLINIC | Age: 74
End: 2024-01-17

## 2024-01-17 DIAGNOSIS — I87.2 VENOUS STASIS DERMATITIS OF BOTH LOWER EXTREMITIES: Primary | ICD-10-CM

## 2024-01-17 RX ORDER — DOXYCYCLINE HYCLATE 100 MG/1
100 CAPSULE ORAL 2 TIMES DAILY
Qty: 20 CAPSULE | Refills: 0 | Status: SHIPPED | OUTPATIENT
Start: 2024-01-17 | End: 2024-01-27

## 2024-01-17 NOTE — TELEPHONE ENCOUNTER
----- Message from Oneil Granger sent at 1/17/2024  9:59 AM EST -----  Subject: Message to Provider    QUESTIONS  Information for Provider? Patient is asking if the PCP can call in some   antibiotics for her legs. She said she was given some before but is out of   those.   ---------------------------------------------------------------------------  --------------  CALL BACK INFO  [phone]; Do not leave any message, patient will call back for answer  ---------------------------------------------------------------------------  --------------  SCRIPT ANSWERS  Relationship to Patient? Self

## 2024-01-17 NOTE — TELEPHONE ENCOUNTER
Spoke with pt in regards to leg concerns. Two patient identifier's verified.  Pt states she legs are itching, with the knot areas returns approximately two on BLE. Pt states she is using hand  and it helps a litle. Pt is encouraged to schedule a follow up appointment. Last OV was in 8/2023. Pt is agreeable. Pt is request a prescritpion for antibitoic. PCP notified.

## 2024-01-17 NOTE — TELEPHONE ENCOUNTER
She received doxycycline in 2022 for skin breakdown.     RX sent. But she needs OV for check of her legs

## 2024-01-25 ENCOUNTER — OFFICE VISIT (OUTPATIENT)
Facility: CLINIC | Age: 74
End: 2024-01-25
Payer: MEDICARE

## 2024-01-25 VITALS
DIASTOLIC BLOOD PRESSURE: 82 MMHG | SYSTOLIC BLOOD PRESSURE: 135 MMHG | HEART RATE: 97 BPM | RESPIRATION RATE: 15 BRPM | HEIGHT: 61 IN | BODY MASS INDEX: 40.78 KG/M2 | OXYGEN SATURATION: 98 % | TEMPERATURE: 97.4 F | WEIGHT: 216 LBS

## 2024-01-25 DIAGNOSIS — M79.89 FINGER SWELLING: ICD-10-CM

## 2024-01-25 DIAGNOSIS — I89.0 ELEPHANTIASIS NOSTRA VERRUCOSA: ICD-10-CM

## 2024-01-25 DIAGNOSIS — E66.01 OBESITY, CLASS III, BMI 40-49.9 (MORBID OBESITY) (HCC): ICD-10-CM

## 2024-01-25 DIAGNOSIS — I87.2 VENOUS STASIS DERMATITIS OF BOTH LOWER EXTREMITIES: ICD-10-CM

## 2024-01-25 DIAGNOSIS — N18.4 CHRONIC KIDNEY DISEASE, STAGE 4 (SEVERE) (HCC): ICD-10-CM

## 2024-01-25 DIAGNOSIS — I10 ESSENTIAL (PRIMARY) HYPERTENSION: Primary | ICD-10-CM

## 2024-01-25 DIAGNOSIS — I73.9 PERIPHERAL VASCULAR DISEASE, UNSPECIFIED (HCC): ICD-10-CM

## 2024-01-25 PROCEDURE — G8427 DOCREV CUR MEDS BY ELIG CLIN: HCPCS | Performed by: INTERNAL MEDICINE

## 2024-01-25 PROCEDURE — 1090F PRES/ABSN URINE INCON ASSESS: CPT | Performed by: INTERNAL MEDICINE

## 2024-01-25 PROCEDURE — 3075F SYST BP GE 130 - 139MM HG: CPT | Performed by: INTERNAL MEDICINE

## 2024-01-25 PROCEDURE — G8417 CALC BMI ABV UP PARAM F/U: HCPCS | Performed by: INTERNAL MEDICINE

## 2024-01-25 PROCEDURE — 3079F DIAST BP 80-89 MM HG: CPT | Performed by: INTERNAL MEDICINE

## 2024-01-25 PROCEDURE — 1123F ACP DISCUSS/DSCN MKR DOCD: CPT | Performed by: INTERNAL MEDICINE

## 2024-01-25 PROCEDURE — 3017F COLORECTAL CA SCREEN DOC REV: CPT | Performed by: INTERNAL MEDICINE

## 2024-01-25 PROCEDURE — G8399 PT W/DXA RESULTS DOCUMENT: HCPCS | Performed by: INTERNAL MEDICINE

## 2024-01-25 PROCEDURE — G8484 FLU IMMUNIZE NO ADMIN: HCPCS | Performed by: INTERNAL MEDICINE

## 2024-01-25 PROCEDURE — 1036F TOBACCO NON-USER: CPT | Performed by: INTERNAL MEDICINE

## 2024-01-25 PROCEDURE — 99214 OFFICE O/P EST MOD 30 MIN: CPT | Performed by: INTERNAL MEDICINE

## 2024-01-25 RX ORDER — PREDNISONE 20 MG/1
20 TABLET ORAL DAILY
Qty: 5 TABLET | Refills: 0 | Status: SHIPPED | OUTPATIENT
Start: 2024-01-25 | End: 2024-01-30

## 2024-01-25 ASSESSMENT — PATIENT HEALTH QUESTIONNAIRE - PHQ9
2. FEELING DOWN, DEPRESSED OR HOPELESS: 0
SUM OF ALL RESPONSES TO PHQ QUESTIONS 1-9: 0
SUM OF ALL RESPONSES TO PHQ9 QUESTIONS 1 & 2: 0
1. LITTLE INTEREST OR PLEASURE IN DOING THINGS: 0
SUM OF ALL RESPONSES TO PHQ QUESTIONS 1-9: 0

## 2024-01-25 ASSESSMENT — ENCOUNTER SYMPTOMS: SHORTNESS OF BREATH: 0

## 2024-01-25 NOTE — PROGRESS NOTES
and oriented to person, place, and time.   Psychiatric:         Mood and Affect: Mood normal.         Behavior: Behavior normal.         ASSESSMENT and PLAN      ICD-10-CM    1. Essential (primary) hypertension  I10       2. Venous stasis dermatitis of both lower extremities  I87.2       3. Chronic kidney disease, stage 4 (severe) ([facility])  N18.4 Basic Metabolic Panel     Uric Acid     Sedimentation Rate     C-Reactive Protein      4. Peripheral vascular disease, unspecified ([facility])  I73.9       5. Finger swelling  M79.89 predniSONE (DELTASONE) 20 MG tablet     Basic Metabolic Panel     Uric Acid     Sedimentation Rate     C-Reactive Protein      6. Obesity, Class III, BMI 40-49.9 (morbid obesity) ([facility])  E66.01       7. 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    Continue same medications and doses    Hand and finger swelling. ? Gout  Check uric acid and pulse with prednisone    Leg skin actually looks good today. No breakdown. Nor redness. No odor    Update lab    F/u 3-4 months for MWV, recheck BP and weight

## 2024-01-26 LAB
BUN SERPL-MCNC: 38 MG/DL (ref 8–27)
BUN/CREAT SERPL: 15 (ref 12–28)
CHLORIDE SERPL-SCNC: 105 MMOL/L (ref 96–106)
CHOLEST SERPL-MCNC: 170 MG/DL (ref 100–199)
CO2 SERPL-SCNC: 19 MMOL/L (ref 20–29)
CREAT SERPL-MCNC: 2.61 MG/DL (ref 0.57–1)
CRP SERPL-MCNC: 37 MG/L (ref 0–10)
EGFRCR SERPLBLD CKD-EPI 2021: 19 ML/MIN/1.73
ERYTHROCYTE [SEDIMENTATION RATE] IN BLOOD BY WESTERGREN METHOD: 120 MM/HR (ref 0–40)
GLUCOSE SERPL-MCNC: 91 MG/DL (ref 70–99)
HDLC SERPL-MCNC: 52 MG/DL
LDLC SERPL CALC-MCNC: 102 MG/DL (ref 0–99)
POTASSIUM SERPL-SCNC: 5 MMOL/L (ref 3.5–5.2)
SODIUM SERPL-SCNC: 140 MMOL/L (ref 134–144)
SPECIMEN STATUS REPORT: NORMAL
TRIGL SERPL-MCNC: 84 MG/DL (ref 0–149)
URATE SERPL-MCNC: 10.1 MG/DL (ref 3.1–7.9)
VLDLC SERPL CALC-MCNC: 16 MG/DL (ref 5–40)

## 2024-01-28 DIAGNOSIS — R70.0 ELEVATED SEDIMENTATION RATE: ICD-10-CM

## 2024-01-28 DIAGNOSIS — M79.89 FINGER SWELLING: Primary | ICD-10-CM

## 2024-02-05 ENCOUNTER — TELEPHONE (OUTPATIENT)
Facility: CLINIC | Age: 74
End: 2024-02-05

## 2024-02-05 NOTE — TELEPHONE ENCOUNTER
----- Message from Carol Murray sent at 2/5/2024 12:28 PM EST -----  Subject: Message to Provider    QUESTIONS  Information for Provider? Pt would like an Appt. for Fri.02/09/2024 at   10:30 For a L Hand X-ray. Please advise.  ---------------------------------------------------------------------------  --------------  CALL BACK INFO  [phone]; Do not leave any message, patient will call back for answer  ---------------------------------------------------------------------------  --------------  SCRIPT ANSWERS  Relationship to Patient? Self

## 2024-02-07 DIAGNOSIS — I10 ESSENTIAL (PRIMARY) HYPERTENSION: ICD-10-CM

## 2024-02-07 RX ORDER — AMLODIPINE BESYLATE 5 MG/1
5 TABLET ORAL DAILY
Qty: 90 TABLET | Refills: 1 | Status: SHIPPED | OUTPATIENT
Start: 2024-02-07

## 2024-02-08 NOTE — TELEPHONE ENCOUNTER
Spoke with pt in regards to xray request. Two patient identifier's verified.  Relayed the PCP's note. Pt states she is scheduled to get the xray tomorrow at 10:30 - 11:00 here tomorrow.

## 2024-02-16 LAB
C3 SERPL-MCNC: 154 MG/DL (ref 82–167)
C4 SERPL-MCNC: 40 MG/DL (ref 12–38)
CENTROMERE B AB SER-ACNC: <0.2 AI (ref 0–0.9)
CHROMATIN AB SERPL-ACNC: <0.2 AI (ref 0–0.9)
DSDNA AB SER-ACNC: <1 IU/ML (ref 0–9)
ENA JO1 AB SER-ACNC: <0.2 AI (ref 0–0.9)
ENA RNP AB SER-ACNC: <0.2 AI (ref 0–0.9)
ENA SCL70 AB SER-ACNC: <0.2 AI (ref 0–0.9)
ENA SM AB SER-ACNC: <0.2 AI (ref 0–0.9)
ENA SS-A AB SER-ACNC: 0.2 AI (ref 0–0.9)
ENA SS-B AB SER-ACNC: <0.2 AI (ref 0–0.9)
Lab: NORMAL
SPECIMEN STATUS REPORT: NORMAL

## 2024-02-17 LAB — CH50 SERPL-ACNC: >60 U/ML

## 2024-02-23 LAB
14-3-3 ETA AG SER IA-MCNC: <0.2 NG/ML
CCP IGA+IGG SERPL IA-ACNC: <20 UNITS
RHEUMATOID FACT SERPL-ACNC: <14 UNITS/ML

## 2024-03-14 RX ORDER — HYDROXYZINE HYDROCHLORIDE 25 MG/1
TABLET, FILM COATED ORAL
Qty: 90 TABLET | Refills: 4 | Status: SHIPPED | OUTPATIENT
Start: 2024-03-14

## 2024-04-22 ENCOUNTER — TRANSCRIBE ORDERS (OUTPATIENT)
Facility: HOSPITAL | Age: 74
End: 2024-04-22

## 2024-04-22 DIAGNOSIS — Z12.31 VISIT FOR SCREENING MAMMOGRAM: Primary | ICD-10-CM

## 2024-04-22 NOTE — TELEPHONE ENCOUNTER
Last Appointment:  1/25/2024  Future Appointments   Date Time Provider Department Center   5/9/2024 11:15 AM [facility]

## 2024-04-23 RX ORDER — TELMISARTAN AND HYDROCHLORTHIAZIDE 40; 12.5 MG/1; MG/1
TABLET ORAL
Qty: 90 TABLET | Refills: 3 | Status: SHIPPED | OUTPATIENT
Start: 2024-04-23

## 2024-05-09 ENCOUNTER — HOSPITAL ENCOUNTER (OUTPATIENT)
Dept: WOMENS IMAGING | Facility: HOSPITAL | Age: 74
Discharge: HOME | End: 2024-05-09
Attending: INTERNAL MEDICINE
Payer: MEDICARE

## 2024-05-09 DIAGNOSIS — Z12.31 VISIT FOR SCREENING MAMMOGRAM: ICD-10-CM

## 2024-05-09 PROCEDURE — 77063 BREAST TOMOSYNTHESIS BI: CPT

## 2024-06-20 RX ORDER — SIMVASTATIN 20 MG
20 TABLET ORAL NIGHTLY
Qty: 90 TABLET | Refills: 2 | Status: SHIPPED | OUTPATIENT
Start: 2024-06-20

## 2024-06-24 ENCOUNTER — TELEPHONE (OUTPATIENT)
Facility: CLINIC | Age: 74
End: 2024-06-24

## 2024-06-24 NOTE — TELEPHONE ENCOUNTER
----- Message from Jeff Barrientos sent at 6/24/2024 12:35 PM EDT -----  Regarding: E[facility] Message to Provider  E[facility] Message to Provider    Relationship to Patient: Self     Additional Information patient would like to talk with her pcp regarding her insurance via phone call 7 days from now  --------------------------------------------------------------------------------------------------------------------------    Call Back Information: Do not leave any message, patient will call back for answer  Preferred Call Back Number: Phone [phone]

## 2024-06-24 NOTE — TELEPHONE ENCOUNTER
Please return call to patient regarding orders for her bed and walker to be sent to [facility] please return call when available

## 2024-06-25 NOTE — TELEPHONE ENCOUNTER
Spoke with pt in regards to hospital bed and walker. Two patient identifier's verified.  Pt states she would like a fully electric hospital bed and another walker. Pt is advised she will need an appointment in clinic to evaluate for these request. Pt verbalizes understanding and is agreeable. PCP notified.

## 2024-07-22 NOTE — TELEPHONE ENCOUNTER
Last Appointment:  1/25/2024  No future appointments.       Spoke with pt and she will give us a call back to schedule her appointment.    F/u 3-4 months for MWV, recheck BP and weight

## 2024-07-23 RX ORDER — CIMETIDINE 400 MG/1
400 TABLET, FILM COATED ORAL 2 TIMES DAILY
Qty: 180 TABLET | Refills: 1 | Status: SHIPPED | OUTPATIENT
Start: 2024-07-23

## 2024-07-30 ENCOUNTER — TELEPHONE (OUTPATIENT)
Facility: CLINIC | Age: 74
End: 2024-07-30

## 2024-08-21 DIAGNOSIS — I10 ESSENTIAL (PRIMARY) HYPERTENSION: ICD-10-CM

## 2024-08-21 RX ORDER — AMLODIPINE BESYLATE 5 MG/1
5 TABLET ORAL DAILY
Qty: 90 TABLET | Refills: 1 | Status: SHIPPED | OUTPATIENT
Start: 2024-08-21

## 2024-08-23 NOTE — PROGRESS NOTES
The following is a separate encounter visit:      HISTORY OF PRESENT ILLNESS      Marlin Silva is a 74 y.o. female.    /74 (Site: Left Upper Arm, Position: Sitting, Cuff Size: Medium Adult)   Pulse 96   Temp 97 °F (36.1 °C) (Temporal)   Resp 15   Ht 1.549 m (5' 1\")   Wt 88.5 kg (195 lb)   SpO2 98%   BMI 36.84 kg/m²     Recently discharged from [facility]. She has been sent there for rehab after admission to [facility]          Grupo Goff MD,RES - 07/08/2024 8:55 AM EDT   [facility]  Formatting of this note is different from the original.  Images from the original note were not included.    [facility] Internal [facility] Discharge Summary  Subjective:    Admit Date: 6/29/2024  Discharge Date: 7/7/2024, 8:56 AM    Attending: Jazzy Rooney MD  Primary Care Provider: MIGUEL Gomes MD    Patient ID:    Marlin Silva  74 y.o. female  1950    Reason for Admission: Peripheral edema [R60.0]  Leg edema [R60.0]    Hospital Course:   Ms. Silva is a 73 yo F with a past medical history of venous stasis dermatitis, elephantiasis nostra verrucosa, CKD4, ANNAMARIE, HTN, HLD, GERD who was admitted for acutely worsening lower extremity edema with concerns for sepsis and skin/soft tissue infection. In ED, patient arrived septic and was started on vancomycin and cefepime, which we have continued. Patient was additionally diuresed, which improved the pain and edema. 1 of 2 wound cultures were positive for coagulase negative staph, likely a contaminant, and thus vancomycin and cefepime were discontinued. Blood cultures NGTD. In the setting of patient arriving septic, she will complete a 7-day course of Augmentin PO on discharge. PT recommends SNF placement, and patient is currently pending placement. Patient is medically cleared for discharge. \"    She stayed in rehab for 2 weeks    Has home aide 5 days a week for 8 hours a day.    She states her legs are much better.              Review of  Instructions/AVS.     Return in about 3 months (around 11/27/2024) for OV extended, HTN, cholesterol, edema, med refills.     Subjective       Patient's complete Health Risk Assessment and screening values have been reviewed and are found in Flowsheets. The following problems were reviewed today and where indicated follow up appointments were made and/or referrals ordered.    Positive Risk Factor Screenings with Interventions:    Fall Risk:  Do you feel unsteady or are you worried about falling? : (!) yes  2 or more falls in past year?: no  Fall with injury in past year?: no     Interventions:    Reviewed medications, home hazards, visual acuity, and co-morbidities that can increase risk for falls  See AVS for additional education material    Cognitive:   Clock Drawing Test (CDT): Normal  Words recalled: 2 Words Recalled  Total Score: 5  Total Score Interpretation: Abnormal Mini-Cog  Interventions:  See AVS for additional education material            Inactivity:  On average, how many days per week do you engage in moderate to strenuous exercise (like a brisk walk)?: 0 days (!) Abnormal  On average, how many minutes do you engage in exercise at this level?: 0 min  Interventions:  See AVS for additional education material    Poor Eating Habits/Diet:  Do you eat balanced/healthy meals regularly?: (!) No  Interventions:  See AVS for additional education material    Abnormal BMI (obese):  Body mass index is 36.84 kg/m². (!) Abnormal  Interventions:  See AVS for additional education material        Dentist Screen:  Have you seen the dentist within the past year?: (!) No    Intervention:  Advised to schedule with their dentist       ADL's:   Patient reports needing help with:  Select all that apply: (!) Grooming, Dressing, Bathing, Walking/Balance  Select all that apply: (!) Laundry, Housekeeping, Food Preparation, Transportation, Shopping  Interventions:  See AVS for additional education material    Advanced

## 2024-08-27 ENCOUNTER — OFFICE VISIT (OUTPATIENT)
Facility: CLINIC | Age: 74
End: 2024-08-27
Payer: MEDICARE

## 2024-08-27 VITALS
DIASTOLIC BLOOD PRESSURE: 74 MMHG | HEIGHT: 61 IN | WEIGHT: 195 LBS | OXYGEN SATURATION: 98 % | SYSTOLIC BLOOD PRESSURE: 120 MMHG | BODY MASS INDEX: 36.82 KG/M2 | RESPIRATION RATE: 15 BRPM | TEMPERATURE: 97 F | HEART RATE: 96 BPM

## 2024-08-27 DIAGNOSIS — I10 ESSENTIAL (PRIMARY) HYPERTENSION: ICD-10-CM

## 2024-08-27 DIAGNOSIS — Z00.00 MEDICARE ANNUAL WELLNESS VISIT, SUBSEQUENT: Primary | ICD-10-CM

## 2024-08-27 DIAGNOSIS — N18.4 CHRONIC KIDNEY DISEASE, STAGE 4 (SEVERE) (HCC): ICD-10-CM

## 2024-08-27 DIAGNOSIS — Z09 HOSPITAL DISCHARGE FOLLOW-UP: ICD-10-CM

## 2024-08-27 DIAGNOSIS — I87.2 VENOUS STASIS DERMATITIS OF BOTH LOWER EXTREMITIES: ICD-10-CM

## 2024-08-27 DIAGNOSIS — Z76.0 MEDICATION REFILL: ICD-10-CM

## 2024-08-27 PROCEDURE — G0439 PPPS, SUBSEQ VISIT: HCPCS | Performed by: INTERNAL MEDICINE

## 2024-08-27 PROCEDURE — 1090F PRES/ABSN URINE INCON ASSESS: CPT | Performed by: INTERNAL MEDICINE

## 2024-08-27 PROCEDURE — G8427 DOCREV CUR MEDS BY ELIG CLIN: HCPCS | Performed by: INTERNAL MEDICINE

## 2024-08-27 PROCEDURE — 1123F ACP DISCUSS/DSCN MKR DOCD: CPT | Performed by: INTERNAL MEDICINE

## 2024-08-27 PROCEDURE — 3078F DIAST BP <80 MM HG: CPT | Performed by: INTERNAL MEDICINE

## 2024-08-27 PROCEDURE — 3074F SYST BP LT 130 MM HG: CPT | Performed by: INTERNAL MEDICINE

## 2024-08-27 PROCEDURE — G8417 CALC BMI ABV UP PARAM F/U: HCPCS | Performed by: INTERNAL MEDICINE

## 2024-08-27 PROCEDURE — 3017F COLORECTAL CA SCREEN DOC REV: CPT | Performed by: INTERNAL MEDICINE

## 2024-08-27 PROCEDURE — 1036F TOBACCO NON-USER: CPT | Performed by: INTERNAL MEDICINE

## 2024-08-27 PROCEDURE — G8399 PT W/DXA RESULTS DOCUMENT: HCPCS | Performed by: INTERNAL MEDICINE

## 2024-08-27 PROCEDURE — 99214 OFFICE O/P EST MOD 30 MIN: CPT | Performed by: INTERNAL MEDICINE

## 2024-08-27 RX ORDER — PETROLATUM 42 G/100G
OINTMENT TOPICAL 2 TIMES DAILY
COMMUNITY
End: 2024-08-27 | Stop reason: SDUPTHER

## 2024-08-27 RX ORDER — PETROLATUM 42 G/100G
OINTMENT TOPICAL 2 TIMES DAILY
Qty: 454 G | Refills: 5 | Status: SHIPPED | OUTPATIENT
Start: 2024-08-27

## 2024-08-27 SDOH — ECONOMIC STABILITY: FOOD INSECURITY: WITHIN THE PAST 12 MONTHS, YOU WORRIED THAT YOUR FOOD WOULD RUN OUT BEFORE YOU GOT MONEY TO BUY MORE.: NEVER TRUE

## 2024-08-27 SDOH — ECONOMIC STABILITY: INCOME INSECURITY: HOW HARD IS IT FOR YOU TO PAY FOR THE VERY BASICS LIKE FOOD, HOUSING, MEDICAL CARE, AND HEATING?: NOT HARD AT ALL

## 2024-08-27 SDOH — ECONOMIC STABILITY: FOOD INSECURITY: WITHIN THE PAST 12 MONTHS, THE FOOD YOU BOUGHT JUST DIDN'T LAST AND YOU DIDN'T HAVE MONEY TO GET MORE.: NEVER TRUE

## 2024-08-27 ASSESSMENT — PATIENT HEALTH QUESTIONNAIRE - PHQ9
SUM OF ALL RESPONSES TO PHQ QUESTIONS 1-9: 0
2. FEELING DOWN, DEPRESSED OR HOPELESS: NOT AT ALL
SUM OF ALL RESPONSES TO PHQ QUESTIONS 1-9: 0
SUM OF ALL RESPONSES TO PHQ QUESTIONS 1-9: 0
1. LITTLE INTEREST OR PLEASURE IN DOING THINGS: NOT AT ALL
SUM OF ALL RESPONSES TO PHQ9 QUESTIONS 1 & 2: 0
SUM OF ALL RESPONSES TO PHQ QUESTIONS 1-9: 0

## 2024-08-27 ASSESSMENT — ENCOUNTER SYMPTOMS: RESPIRATORY NEGATIVE: 1

## 2024-08-27 NOTE — PATIENT INSTRUCTIONS
provide support. Talk to them about why you are trying to lose weight, and ask them to help. They can help by participating in exercise and having meals with you, even if they may be eating something different.  Find what works best for you. If you do not have time or do not like to cook, a program that offers meal replacement bars or shakes may be better for you. Or if you like to prepare meals, finding a plan that includes daily menus and recipes may be best.  Ask your doctor about other health professionals who can help you achieve your weight loss goals.  A dietitian can help you make healthy changes in your diet.  An exercise specialist or  can help you develop a safe and effective exercise program.  A counselor or psychiatrist can help you cope with issues such as depression, anxiety, or family problems that can make it hard to focus on weight loss.  Consider joining a support group for people who are trying to lose weight. Your doctor can suggest groups in your area.  Where can you learn more?  Go to https://www.[employer].net/patientEd and enter U357 to learn more about \"Starting a Weight Loss Plan: Care Instructions.\"  Current as of: September 20, 2023  Content Version: 14.1  © 4423-5498 [employer].   Care instructions adapted under license by [employer]. If you have questions about a medical condition or this instruction, always ask your healthcare professional. [employer] disclaims any warranty or liability for your use of this information.           Advance Directives: Care Instructions  Overview  An advance directive is a legal way to state your wishes at the end of your life. It tells your family and your doctor what to do if you can't say what you want.  There are two main types of advance directives. You can change them any time your wishes change.  Living will.  This form tells your family and your doctor your wishes about life support and other  comprehensive review of your medical history including lifestyle, illnesses that may run in your family, and various assessments and screenings as appropriate.    After reviewing your medical record and screening and assessments performed today your provider may have ordered immunizations, labs, imaging, and/or referrals for you.  A list of these orders (if applicable) as well as your Preventive Care list are included within your After Visit Summary for your review.    Other Preventive Recommendations:    A preventive eye exam performed by an eye specialist is recommended every 1-2 years to screen for glaucoma; cataracts, macular degeneration, and other eye disorders.  A preventive dental visit is recommended every 6 months.  Try to get at least 150 minutes of exercise per week or 10,000 steps per day on a pedometer .  Order or download the FREE \"Exercise & Physical Activity: Your Everyday Guide\" from [employer]. Call 1-630.651.1398 or search [employer] online.  You need 2946-9194 mg of calcium and 5874-0651 IU of vitamin D per day. It is possible to meet your calcium requirement with diet alone, but a vitamin D supplement is usually necessary to meet this goal.  When exposed to the sun, use a sunscreen that protects against both UVA and UVB radiation with an SPF of 30 or greater. Reapply every 2 to 3 hours or after sweating, drying off with a towel, or swimming.  Always wear a seat belt when traveling in a car. Always wear a helmet when riding a bicycle or motorcycle.

## 2024-08-27 NOTE — PROGRESS NOTES
\"Have you been to the ER, urgent care clinic since your last visit?  Hospitalized since your last visit?\"    YES - When: approximately 1 months ago.  Where and Why: [facility].    “Have you seen or consulted any other health care providers outside of [facility] since your last visit?”    YES - When: approximately 1  weeks ago.  Where and Why: Nephrology.            Click Here for Release of Records Request

## 2024-09-10 RX ORDER — FERROUS SULFATE 325(65) MG
TABLET ORAL
Qty: 30 TABLET | Refills: 4 | Status: SHIPPED | OUTPATIENT
Start: 2024-09-10

## 2024-09-24 ENCOUNTER — TELEPHONE (OUTPATIENT)
Facility: CLINIC | Age: 74
End: 2024-09-24

## 2024-09-24 NOTE — TELEPHONE ENCOUNTER
Amandapk from [facility] called to inform provider that she spoke with the pt this morning. Pt informed Gema that she was in a lot of pain in her legs during the night hours. Please follow up.

## 2024-09-25 NOTE — TELEPHONE ENCOUNTER
Spoke with clinic staff to clarify the incoming message. Clinic Staff states as follows: [facility] staff called the pt regarding an undisclosed matter. Pt informed the [facility] staff she has been experiencing leg pain. [facility] staff called to notify this office as a courtesy.    Call placed to the pt to follow up and left a voicemail regarding this matter. PCP notified.

## 2024-10-28 ENCOUNTER — TELEPHONE (OUTPATIENT)
Facility: CLINIC | Age: 74
End: 2024-10-28

## 2024-10-28 DIAGNOSIS — M17.0 PRIMARY OSTEOARTHRITIS OF BOTH KNEES: Primary | ICD-10-CM

## 2024-10-28 DIAGNOSIS — R26.89 IMPAIRED GAIT AND MOBILITY: ICD-10-CM

## 2024-10-28 NOTE — TELEPHONE ENCOUNTER
Call received from [employer] with the patient on the line.  Request an order for a manual wheelchair to be sent to [facility].  Patient is having a difficult time walking and getting around to different appointments.  Has an appt scheduled with you on 11/13.    [facility] [phone] (Phone)

## 2024-10-28 NOTE — TELEPHONE ENCOUNTER
Meghana with [facility] is calling in to let you know the patient's resting heart rate will not go below 110.  She has no other symptoms, just an elevated heart rate.

## 2024-11-04 ENCOUNTER — TELEPHONE (OUTPATIENT)
Facility: CLINIC | Age: 74
End: 2024-11-04

## 2024-11-04 RX ORDER — ROSUVASTATIN CALCIUM 20 MG/1
20 TABLET, COATED ORAL DAILY
Qty: 30 TABLET | Refills: 5 | Status: SHIPPED | OUTPATIENT
Start: 2024-11-04

## 2024-11-04 RX ORDER — DILTIAZEM HYDROCHLORIDE 30 MG/1
30 TABLET, FILM COATED ORAL 2 TIMES DAILY
Qty: 60 TABLET | Refills: 5 | Status: SHIPPED | OUTPATIENT
Start: 2024-11-04

## 2024-11-04 NOTE — TELEPHONE ENCOUNTER
Meghana with [facility] is calling back to tlet you know the patient's heart rate is still a little elevated at 106 resting and up to 117 with light exertion.

## 2024-11-04 NOTE — TELEPHONE ENCOUNTER
Chart reviewed:  The following medications were prescribed at the last hospitalization:  dilTIAZem (CARDIZEM) 30 mg PO TABS  Take 1 Tab by Mouth Every 12 hours. 60 Tab  1 10/14/2024     rosuvastatin (CRESTOR) 10 mg PO TABS  Take 1 Tab by Mouth Every Night at Bedtime. 30 Tab  1 10/14/2024       PCP notified.

## 2024-11-04 NOTE — TELEPHONE ENCOUNTER
[facility] stating the patient needs a refill on the following medications.    Rosuvastatin 20mg & Diltiazem 30mg    I do not see either of these medication on the patient's file.

## 2024-11-04 NOTE — TELEPHONE ENCOUNTER
Orders Placed This Encounter    dilTIAZem (CARDIZEM) 30 MG tablet     Sig: Take 1 tablet by mouth 2 times daily     Dispense:  60 tablet     Refill:  5    rosuvastatin (CRESTOR) 20 MG tablet     Sig: Take 1 tablet by mouth daily     Dispense:  30 tablet     Refill:  5

## 2024-11-13 ENCOUNTER — CLINICAL DOCUMENTATION (OUTPATIENT)
Facility: CLINIC | Age: 74
End: 2024-11-13

## 2024-11-13 NOTE — PROGRESS NOTES
Patient contacted on 11/12 to confirm appt for 11/13.  Patient stated she may have to come in early due to her ride.  Advised that was fine but there was no guarantee she would be seen earlier than her appt time.    Patient arrived for her appt at 1:21PM, advised again that she would have to wait until her appt time but that if there was an opening we would try to get her seen earlier, but again there was no guarantee of this.    Patient left at 3:13 PM stating her ride was here, which was apparently scheduled to pick her up at 3:30PM, which was her scheduled Appt time with the doctor.

## 2024-11-20 ENCOUNTER — TELEPHONE (OUTPATIENT)
Facility: CLINIC | Age: 74
End: 2024-11-20

## 2024-11-20 NOTE — TELEPHONE ENCOUNTER
Did not she recently begin dialysis?    Was the PT assessment before or after dialysis?  Where there were any other changes in her routine?  Has she eaten, for example?  Was she symptomatic in any way?  If not, they should be able to proceed with PT.    Our notes say that she was also on amlodipine.  Was this discontinued by someone else?

## 2024-11-20 NOTE — TELEPHONE ENCOUNTER
[facility] is calling to to let you know while out to see the patient today for PT her BP was very low at 90/40, heart rate 112.  States she has no other symptoms and is only on 3 medications, (Cardizem, Crestor & Aquaphor).  Needs to make you aware and get the Ok to continue PT with her low BP.    Ara - [phone]

## 2024-12-11 ENCOUNTER — OFFICE VISIT (OUTPATIENT)
Facility: CLINIC | Age: 74
End: 2024-12-11

## 2024-12-11 VITALS
DIASTOLIC BLOOD PRESSURE: 58 MMHG | RESPIRATION RATE: 14 BRPM | TEMPERATURE: 97.3 F | HEIGHT: 61 IN | HEART RATE: 104 BPM | WEIGHT: 163.25 LBS | OXYGEN SATURATION: 99 % | SYSTOLIC BLOOD PRESSURE: 114 MMHG | BODY MASS INDEX: 30.82 KG/M2

## 2024-12-11 DIAGNOSIS — I10 ESSENTIAL (PRIMARY) HYPERTENSION: Primary | ICD-10-CM

## 2024-12-11 DIAGNOSIS — Z99.2 ESRD (END STAGE RENAL DISEASE) ON DIALYSIS (HCC): ICD-10-CM

## 2024-12-11 DIAGNOSIS — Z76.0 MEDICATION REFILL: ICD-10-CM

## 2024-12-11 DIAGNOSIS — N18.6 ESRD (END STAGE RENAL DISEASE) ON DIALYSIS (HCC): ICD-10-CM

## 2024-12-11 DIAGNOSIS — I89.0 LYMPHEDEMA, NOT ELSEWHERE CLASSIFIED: ICD-10-CM

## 2024-12-11 PROBLEM — E66.01 OBESITY, MORBID: Status: RESOLVED | Noted: 2018-04-17 | Resolved: 2024-12-11

## 2024-12-11 RX ORDER — HYDROPHOR 42 G/100G
OINTMENT TOPICAL
Qty: 454 G | Refills: 5 | Status: SHIPPED | OUTPATIENT
Start: 2024-12-11

## 2024-12-11 RX ORDER — AZELASTINE 1 MG/ML
1 SPRAY, METERED NASAL 2 TIMES DAILY PRN
Qty: 30 ML | Refills: 5 | Status: SHIPPED | OUTPATIENT
Start: 2024-12-11

## 2024-12-11 RX ORDER — HYDROXYZINE HYDROCHLORIDE 25 MG/1
25 TABLET, FILM COATED ORAL EVERY 8 HOURS PRN
Qty: 90 TABLET | Refills: 4 | Status: SHIPPED | OUTPATIENT
Start: 2024-12-11

## 2024-12-11 RX ORDER — AMLODIPINE BESYLATE 5 MG/1
5 TABLET ORAL DAILY
Qty: 90 TABLET | Refills: 1 | Status: SHIPPED | OUTPATIENT
Start: 2024-12-11

## 2024-12-11 SDOH — ECONOMIC STABILITY: FOOD INSECURITY: WITHIN THE PAST 12 MONTHS, THE FOOD YOU BOUGHT JUST DIDN'T LAST AND YOU DIDN'T HAVE MONEY TO GET MORE.: NEVER TRUE

## 2024-12-11 SDOH — ECONOMIC STABILITY: FOOD INSECURITY: WITHIN THE PAST 12 MONTHS, YOU WORRIED THAT YOUR FOOD WOULD RUN OUT BEFORE YOU GOT MONEY TO BUY MORE.: NEVER TRUE

## 2024-12-11 SDOH — ECONOMIC STABILITY: INCOME INSECURITY: HOW HARD IS IT FOR YOU TO PAY FOR THE VERY BASICS LIKE FOOD, HOUSING, MEDICAL CARE, AND HEATING?: NOT HARD AT ALL

## 2024-12-11 ASSESSMENT — PATIENT HEALTH QUESTIONNAIRE - PHQ9
SUM OF ALL RESPONSES TO PHQ9 QUESTIONS 1 & 2: 0
SUM OF ALL RESPONSES TO PHQ QUESTIONS 1-9: 0
SUM OF ALL RESPONSES TO PHQ QUESTIONS 1-9: 0
2. FEELING DOWN, DEPRESSED OR HOPELESS: NOT AT ALL
SUM OF ALL RESPONSES TO PHQ QUESTIONS 1-9: 0
SUM OF ALL RESPONSES TO PHQ QUESTIONS 1-9: 0
1. LITTLE INTEREST OR PLEASURE IN DOING THINGS: NOT AT ALL

## 2024-12-11 ASSESSMENT — ENCOUNTER SYMPTOMS: SHORTNESS OF BREATH: 0

## 2024-12-11 NOTE — PROGRESS NOTES
\"Have you been to the ER, urgent care clinic since your last visit?  Hospitalized since your last visit?\"    YES - When: approximately 2  weeks ago.  Where and Why: [facility] ED for heart problem.    “Have you seen or consulted any other health care providers outside our system since your last visit?”    YES - When: approximately 1 days ago.  Where and Why: Dialysis, tree times a week, [facility].           
Continue same routine. Refill amlodipine    Reviewed ER note from November including lab,  CXR and EKG results    [facility]    Her edema has improved as well on dialysis. 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    Refills a note

## 2025-04-23 NOTE — TELEPHONE ENCOUNTER
Ms. Silva is requesting refills of:    Requested Prescriptions     Pending Prescriptions Disp Refills    cimetidine (TAGAMET) 400 MG tablet 180 tablet 1     Sig: Take 1 tablet by mouth in the morning and at bedtime         to be sent to   [facility] [place], [place] - [address] - P [phone] - F [phone]  [address] [facility] [address]  Phone: [phone] Fax: [phone]  .     LAST OFFICE VISIT:  12/11/2024     UPCOMING APPOINTMENT(S):  Future Appointments   Date Time Provider Department Center   5/13/2025 11:40 AM [facility]       Patient offered an appointment? N/a  How much medication does the patient have on hand? unknown    Provided notified

## 2025-04-24 RX ORDER — CIMETIDINE 400 MG
400 TABLET ORAL 2 TIMES DAILY
Qty: 30 TABLET | Refills: 0 | Status: SHIPPED | OUTPATIENT
Start: 2025-04-24

## 2025-05-30 NOTE — TELEPHONE ENCOUNTER
Ms. Silva is requesting refills of:    Requested Prescriptions     Pending Prescriptions Disp Refills    rosuvastatin (CRESTOR) 20 MG tablet 30 tablet 5     Sig: Take 1 tablet by mouth daily         to be sent to   [facility] - [place], [place] - [address] - P [phone] - F [phone]  [address] [facility] [address]  Phone: [phone] Fax: [phone]  .     LAST OFFICE VISIT:  12/11/2024     UPCOMING APPOINTMENT(S):  Future Appointments   Date Time Provider Department Center   6/2/2025 11:00 AM [facility] RM 1 [facility]   6/4/2025 11:30 AM Meghana Gomes MD [facility]         Provided notified

## 2025-06-01 RX ORDER — FERROUS SULFATE 325(65) MG
1 TABLET ORAL
Qty: 30 TABLET | Refills: 4 | Status: SHIPPED | OUTPATIENT
Start: 2025-06-01

## 2025-06-01 RX ORDER — ROSUVASTATIN CALCIUM 20 MG/1
20 TABLET, COATED ORAL DAILY
Qty: 30 TABLET | Refills: 5 | Status: SHIPPED | OUTPATIENT
Start: 2025-06-01

## 2025-06-02 ENCOUNTER — HOSPITAL ENCOUNTER (OUTPATIENT)
Dept: WOMENS IMAGING | Facility: HOSPITAL | Age: 75
Discharge: HOME | End: 2025-06-05
Attending: INTERNAL MEDICINE
Payer: MEDICARE

## 2025-06-02 DIAGNOSIS — Z12.31 VISIT FOR SCREENING MAMMOGRAM: ICD-10-CM

## 2025-06-02 PROCEDURE — 77063 BREAST TOMOSYNTHESIS BI: CPT

## 2025-06-04 ENCOUNTER — TELEPHONE (OUTPATIENT)
Facility: CLINIC | Age: 75
End: 2025-06-04

## 2025-06-04 NOTE — TELEPHONE ENCOUNTER
----- Message from Maximino STEWART sent at 6/4/2025  9:04 AM EDT -----  Regarding: [facility] Appointment Request  [facility] Appointment Request    Patient needs appointment for [facility] Appointment Type: ED Follow-Up.    Patient Requested Dates(s): Next Monday   Patient Requested Time: Morning   Provider Name: Meghana Gomes MD    Reason for Appointment Request: Established Patient - Available appointments did not meet patient need and will not be able to go die to her ride canceled.  --------------------------------------------------------------------------------------------------------------------------    Relationship to Patient: Self     Call Back Information: OK to leave message on [employer]  Preferred Call Back Number: Phone [phone]

## 2025-06-04 NOTE — TELEPHONE ENCOUNTER
Spoke with patient to reschedule her ER follow up.  Pt had transportation issues and had to cancel prior appt.  Patient has been rescheduled.

## 2025-06-16 NOTE — TELEPHONE ENCOUNTER
Ms. Silva is requesting refills of:    Requested Prescriptions     Pending Prescriptions Disp Refills    cimetidine (TAGAMET) 400 MG tablet [Pharmacy Med Name: CIMETIDINE 400 MG TABLET] 30 tablet 0     Sig: TAKE 1 TABLET BY MOUTH IN THE MORNING AND AT BEDTIME         to be sent to   [facility] - [place], [place] - [address] - P [phone] - F [phone]  [address] [facility] [address]  Phone: [phone] Fax: [phone]  .     LAST OFFICE VISIT:  12/11/2024     UPCOMING APPOINTMENT(S):  Future Appointments   Date Time Provider Department Center   7/2/2025 11:30 AM [facility]         Provided notified

## 2025-06-17 RX ORDER — CIMETIDINE 400 MG
400 TABLET ORAL 2 TIMES DAILY
Qty: 60 TABLET | Refills: 5 | Status: SHIPPED | OUTPATIENT
Start: 2025-06-17

## 2025-09-03 ENCOUNTER — OFFICE VISIT (OUTPATIENT)
Facility: CLINIC | Age: 75
End: 2025-09-03
Payer: MEDICARE

## 2025-09-03 VITALS
HEIGHT: 64 IN | TEMPERATURE: 97.2 F | HEART RATE: 90 BPM | SYSTOLIC BLOOD PRESSURE: 103 MMHG | RESPIRATION RATE: 14 BRPM | WEIGHT: 163 LBS | OXYGEN SATURATION: 100 % | BODY MASS INDEX: 27.83 KG/M2 | DIASTOLIC BLOOD PRESSURE: 64 MMHG

## 2025-09-03 DIAGNOSIS — I10 ESSENTIAL (PRIMARY) HYPERTENSION: ICD-10-CM

## 2025-09-03 DIAGNOSIS — I89.0 LYMPHEDEMA, NOT ELSEWHERE CLASSIFIED: ICD-10-CM

## 2025-09-03 DIAGNOSIS — Z00.00 MEDICARE ANNUAL WELLNESS VISIT, SUBSEQUENT: Primary | ICD-10-CM

## 2025-09-03 DIAGNOSIS — Z76.0 MEDICATION REFILL: ICD-10-CM

## 2025-09-03 DIAGNOSIS — I89.0 ELEPHANTIASIS NOSTRA VERRUCOSA: ICD-10-CM

## 2025-09-03 DIAGNOSIS — N18.6 ESRD (END STAGE RENAL DISEASE) ON DIALYSIS (HCC): ICD-10-CM

## 2025-09-03 DIAGNOSIS — Z99.2 ESRD (END STAGE RENAL DISEASE) ON DIALYSIS (HCC): ICD-10-CM

## 2025-09-03 PROCEDURE — G0439 PPPS, SUBSEQ VISIT: HCPCS | Performed by: INTERNAL MEDICINE

## 2025-09-03 PROCEDURE — 1123F ACP DISCUSS/DSCN MKR DOCD: CPT | Performed by: INTERNAL MEDICINE

## 2025-09-03 PROCEDURE — G8428 CUR MEDS NOT DOCUMENT: HCPCS | Performed by: INTERNAL MEDICINE

## 2025-09-03 PROCEDURE — 3074F SYST BP LT 130 MM HG: CPT | Performed by: INTERNAL MEDICINE

## 2025-09-03 PROCEDURE — 3078F DIAST BP <80 MM HG: CPT | Performed by: INTERNAL MEDICINE

## 2025-09-03 PROCEDURE — G8399 PT W/DXA RESULTS DOCUMENT: HCPCS | Performed by: INTERNAL MEDICINE

## 2025-09-03 PROCEDURE — 1036F TOBACCO NON-USER: CPT | Performed by: INTERNAL MEDICINE

## 2025-09-03 PROCEDURE — G8417 CALC BMI ABV UP PARAM F/U: HCPCS | Performed by: INTERNAL MEDICINE

## 2025-09-03 PROCEDURE — 3017F COLORECTAL CA SCREEN DOC REV: CPT | Performed by: INTERNAL MEDICINE

## 2025-09-03 PROCEDURE — G2211 COMPLEX E/M VISIT ADD ON: HCPCS | Performed by: INTERNAL MEDICINE

## 2025-09-03 PROCEDURE — 99213 OFFICE O/P EST LOW 20 MIN: CPT | Performed by: INTERNAL MEDICINE

## 2025-09-03 PROCEDURE — 1090F PRES/ABSN URINE INCON ASSESS: CPT | Performed by: INTERNAL MEDICINE

## 2025-09-03 RX ORDER — HYDROPHOR 42 G/100G
OINTMENT TOPICAL
Qty: 454 G | Refills: 5 | Status: SHIPPED | OUTPATIENT
Start: 2025-09-03

## 2025-09-03 RX ORDER — SEVELAMER CARBONATE 800 MG/1
1 TABLET, FILM COATED ORAL
COMMUNITY

## 2025-09-03 SDOH — ECONOMIC STABILITY: FOOD INSECURITY: WITHIN THE PAST 12 MONTHS, YOU WORRIED THAT YOUR FOOD WOULD RUN OUT BEFORE YOU GOT MONEY TO BUY MORE.: NEVER TRUE

## 2025-09-03 SDOH — ECONOMIC STABILITY: FOOD INSECURITY: WITHIN THE PAST 12 MONTHS, THE FOOD YOU BOUGHT JUST DIDN'T LAST AND YOU DIDN'T HAVE MONEY TO GET MORE.: NEVER TRUE

## 2025-09-03 ASSESSMENT — PATIENT HEALTH QUESTIONNAIRE - PHQ9
SUM OF ALL RESPONSES TO PHQ QUESTIONS 1-9: 0
2. FEELING DOWN, DEPRESSED OR HOPELESS: NOT AT ALL
1. LITTLE INTEREST OR PLEASURE IN DOING THINGS: NOT AT ALL
SUM OF ALL RESPONSES TO PHQ QUESTIONS 1-9: 0

## 2025-09-03 ASSESSMENT — ENCOUNTER SYMPTOMS
CHEST TIGHTNESS: 0
SHORTNESS OF BREATH: 0

## (undated) DEVICE — FORCEPS BX L240CM JAW DIA2.8MM L CAP W/ NDL MIC MESH TOOTH

## (undated) DEVICE — MEDI-VAC NON-CONDUCTIVE SUCTION TUBING: Brand: CARDINAL HEALTH

## (undated) DEVICE — BASIN EMESIS 500CC ROSE 250/CS 60/PLT: Brand: MEDEGEN MEDICAL PRODUCTS, LLC

## (undated) DEVICE — FLEX ADVANTAGE 1500CC: Brand: FLEX ADVANTAGE

## (undated) DEVICE — BITE BLK ENDOSCP AD 54FR GRN POLYETH ENDOSCP W STRP SLD

## (undated) DEVICE — KENDALL 500 SERIES DIAPHORETIC FOAM MONITORING ELECTRODE - TEAR DROP SHAPE ( 30/PK): Brand: KENDALL

## (undated) DEVICE — KIT COLON W/ 1.1OZ LUB AND 2 END

## (undated) DEVICE — DEVICE INFL 60ML 12ATM CONVENIENT LOK REL HNDL HI PRSS FLX

## (undated) DEVICE — SYR 50ML SLIP TIP NSAF LF STRL --

## (undated) DEVICE — CONTAINER PREFIL FRMLN 15ML --